# Patient Record
Sex: MALE | Race: WHITE | NOT HISPANIC OR LATINO | Employment: OTHER | URBAN - METROPOLITAN AREA
[De-identification: names, ages, dates, MRNs, and addresses within clinical notes are randomized per-mention and may not be internally consistent; named-entity substitution may affect disease eponyms.]

---

## 2023-03-18 LAB
THYROTROPIN (MIU/L) IN SER/PLAS BY DETECTION LIMIT <= 0.05 MIU/L: 2.02 MIU/L (ref 0.44–3.98)
THYROXINE (T4) FREE (NG/DL) IN SER/PLAS: 1.02 NG/DL (ref 0.78–1.48)

## 2023-03-22 LAB — TSH RECEPTOR ANTIBODY: 1.68 IU/L

## 2023-04-02 ENCOUNTER — HOSPITAL ENCOUNTER (OUTPATIENT)
Dept: DATA CONVERSION | Facility: HOSPITAL | Age: 73
Discharge: LEFT BEFORE DISCHARGE | End: 2023-04-02
Attending: EMERGENCY MEDICINE
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
## 2023-04-02 DIAGNOSIS — Z79.01 LONG TERM (CURRENT) USE OF ANTICOAGULANTS: ICD-10-CM

## 2023-04-02 DIAGNOSIS — I48.91 UNSPECIFIED ATRIAL FIBRILLATION (MULTI): ICD-10-CM

## 2023-04-02 DIAGNOSIS — Z20.822 CONTACT WITH AND (SUSPECTED) EXPOSURE TO COVID-19: ICD-10-CM

## 2023-04-02 DIAGNOSIS — R55 SYNCOPE AND COLLAPSE: Primary | ICD-10-CM

## 2023-04-02 DIAGNOSIS — S00.03XA CONTUSION OF SCALP, INITIAL ENCOUNTER: ICD-10-CM

## 2023-04-02 DIAGNOSIS — S50.311A ABRASION OF RIGHT ELBOW, INITIAL ENCOUNTER: ICD-10-CM

## 2023-04-02 DIAGNOSIS — D64.9 ANEMIA, UNSPECIFIED: ICD-10-CM

## 2023-04-02 LAB
ALBUMIN SERPL-MCNC: 3.9 GM/DL (ref 3.5–5)
ALBUMIN/GLOB SERPL: 1.1 RATIO (ref 1.5–3)
ALP BLD-CCNC: 95 U/L (ref 35–125)
ALT SERPL-CCNC: 11 U/L (ref 5–40)
ANION GAP SERPL CALCULATED.3IONS-SCNC: 11 MMOL/L (ref 0–19)
AST SERPL-CCNC: 14 U/L (ref 5–40)
BASOPHILS # BLD AUTO: 0.07 K/UL (ref 0–0.22)
BASOPHILS NFR BLD AUTO: 0.8 % (ref 0–1)
BILIRUB SERPL-MCNC: 0.4 MG/DL (ref 0.1–1.2)
BUN SERPL-MCNC: 19 MG/DL (ref 8–25)
BUN/CREAT SERPL: 19 RATIO (ref 8–21)
CALCIUM SERPL-MCNC: 9.1 MG/DL (ref 8.5–10.4)
CHLORIDE SERPL-SCNC: 101 MMOL/L (ref 97–107)
CO2 SERPL-SCNC: 24 MMOL/L (ref 24–31)
CREAT SERPL-MCNC: 1 MG/DL (ref 0.4–1.6)
DEPRECATED RDW RBC AUTO: 42.3 FL (ref 37–54)
DIFFERENTIAL METHOD BLD: ABNORMAL
EOSINOPHIL # BLD AUTO: 0.19 K/UL (ref 0–0.45)
EOSINOPHIL NFR BLD: 2.2 % (ref 0–3)
ERYTHROCYTE [DISTWIDTH] IN BLOOD BY AUTOMATED COUNT: 15.4 % (ref 11.7–15)
EUA DISCLAIMER: NORMAL
FLUAV RNA NPH QL NAA+PROBE: NEGATIVE
FLUBV RNA NPH QL NAA+PROBE: NEGATIVE
GFR SERPL CREATININE-BSD FRML MDRD: 79 ML/MIN/1.73 M2
GLOBULIN SER-MCNC: 3.4 G/DL (ref 1.9–3.7)
GLUCOSE SERPL-MCNC: 132 MG/DL (ref 65–99)
HCT VFR BLD AUTO: 29.4 % (ref 41–50)
HGB BLD-MCNC: 8.9 GM/DL (ref 13.5–16.5)
IMM GRANULOCYTES # BLD AUTO: 0.02 K/UL (ref 0–0.1)
LYMPHOCYTES # BLD AUTO: 2.68 K/UL (ref 1.2–3.2)
LYMPHOCYTES NFR BLD MANUAL: 30.5 % (ref 20–40)
MCH RBC QN AUTO: 23.4 PG (ref 26–34)
MCHC RBC AUTO-ENTMCNC: 30.3 % (ref 31–37)
MCV RBC AUTO: 77.4 FL (ref 80–100)
MONOCYTES # BLD AUTO: 1.09 K/UL (ref 0–0.8)
MONOCYTES NFR BLD MANUAL: 12.4 % (ref 0–8)
NEUTROPHILS # BLD AUTO: 4.75 K/UL
NEUTROPHILS # BLD AUTO: 4.75 K/UL (ref 1.8–7.7)
NEUTROPHILS.IMMATURE NFR BLD: 0.2 % (ref 0–1)
NEUTS SEG NFR BLD: 53.9 % (ref 50–70)
NRBC BLD-RTO: 0 /100 WBC
PLATELET # BLD AUTO: 244 K/UL (ref 150–450)
PMV BLD AUTO: 12.7 CU (ref 7–12.6)
POTASSIUM SERPL-SCNC: 4.4 MMOL/L (ref 3.4–5.1)
PROT SERPL-MCNC: 7.3 G/DL (ref 5.9–7.9)
RBC # BLD AUTO: 3.8 M/UL (ref 4.5–5.5)
SARS-COV-2 RNA SPEC QL NAA+PROBE: NEGATIVE
SODIUM SERPL-SCNC: 136 MMOL/L (ref 133–145)
WBC # BLD AUTO: 8.8 K/UL (ref 4.5–11)

## 2023-05-13 LAB
ACANTHOCYTES PRESENCE IN BLOOD BY LIGHT MICROSCOPY: NORMAL
BASOPHILS (10*3/UL) IN BLOOD BY AUTOMATED COUNT: 0.09 X10E9/L (ref 0–0.1)
BASOPHILS/100 LEUKOCYTES IN BLOOD BY AUTOMATED COUNT: 0.9 % (ref 0–2)
EOSINOPHILS (10*3/UL) IN BLOOD BY AUTOMATED COUNT: 0.17 X10E9/L (ref 0–0.4)
EOSINOPHILS/100 LEUKOCYTES IN BLOOD BY AUTOMATED COUNT: 1.8 % (ref 0–6)
ERYTHROCYTE DISTRIBUTION WIDTH (RATIO) BY AUTOMATED COUNT: 22 % (ref 11.5–14.5)
ERYTHROCYTE MEAN CORPUSCULAR HEMOGLOBIN CONCENTRATION (G/DL) BY AUTOMATED: 31.3 G/DL (ref 32–36)
ERYTHROCYTE MEAN CORPUSCULAR VOLUME (FL) BY AUTOMATED COUNT: 84 FL (ref 80–100)
ERYTHROCYTES (10*6/UL) IN BLOOD BY AUTOMATED COUNT: 2.94 X10E12/L (ref 4.5–5.9)
FERRITIN (UG/LL) IN SER/PLAS: 17 UG/L (ref 20–300)
HEMATOCRIT (%) IN BLOOD BY AUTOMATED COUNT: 24.6 % (ref 41–52)
HEMOGLOBIN (G/DL) IN BLOOD: 7.7 G/DL (ref 13.5–17.5)
HYPOCHROMIA (PRESENCE) IN BLOOD BY LIGHT MICROSCOPY: NORMAL
IMMATURE GRANULOCYTES/100 LEUKOCYTES IN BLOOD BY AUTOMATED COUNT: 0.2 % (ref 0–0.9)
IRON (UG/DL) IN SER/PLAS: 23 UG/DL (ref 35–150)
IRON BINDING CAPACITY (UG/DL) IN SER/PLAS: >473 UG/DL (ref 240–445)
IRON SATURATION (%) IN SER/PLAS: ABNORMAL % (ref 25–45)
LEUKOCYTES (10*3/UL) IN BLOOD BY AUTOMATED COUNT: 9.5 X10E9/L (ref 4.4–11.3)
LYMPHOCYTES (10*3/UL) IN BLOOD BY AUTOMATED COUNT: 2.92 X10E9/L (ref 0.8–3)
LYMPHOCYTES/100 LEUKOCYTES IN BLOOD BY AUTOMATED COUNT: 30.7 % (ref 13–44)
MONOCYTES (10*3/UL) IN BLOOD BY AUTOMATED COUNT: 1.07 X10E9/L (ref 0.05–0.8)
MONOCYTES/100 LEUKOCYTES IN BLOOD BY AUTOMATED COUNT: 11.2 % (ref 2–10)
NEUTROPHILS (10*3/UL) IN BLOOD BY AUTOMATED COUNT: 5.25 X10E9/L (ref 1.6–5.5)
NEUTROPHILS/100 LEUKOCYTES IN BLOOD BY AUTOMATED COUNT: 55.2 % (ref 40–80)
NRBC (PER 100 WBCS) BY AUTOMATED COUNT: 0.2 /100 WBC (ref 0–0)
PLATELETS (10*3/UL) IN BLOOD AUTOMATED COUNT: 197 X10E9/L (ref 150–450)
POLYCHROMASIA IN BLOOD BY LIGHT MICROSCOPY: NORMAL
RBC MORPHOLOGY IN BLOOD: NORMAL
SCHISTOCYTES (PRESENCE) IN BLOOD BY LIGHT MICROSCOPY: NORMAL
TARGET CELLS IN BLOOD BY LIGHT MICROSCOPY: NORMAL

## 2023-05-20 LAB
CARCINOEMBRYONIC AG (NG/ML) IN SER/PLAS: 4.7 UG/L
ERYTHROCYTE DISTRIBUTION WIDTH (RATIO) BY AUTOMATED COUNT: 20.9 % (ref 11.5–14.5)
ERYTHROCYTE MEAN CORPUSCULAR HEMOGLOBIN CONCENTRATION (G/DL) BY AUTOMATED: 32.5 G/DL (ref 32–36)
ERYTHROCYTE MEAN CORPUSCULAR VOLUME (FL) BY AUTOMATED COUNT: 83 FL (ref 80–100)
ERYTHROCYTES (10*6/UL) IN BLOOD BY AUTOMATED COUNT: 2.78 X10E12/L (ref 4.5–5.9)
HEMATOCRIT (%) IN BLOOD BY AUTOMATED COUNT: 23.1 % (ref 41–52)
HEMOGLOBIN (G/DL) IN BLOOD: 7.5 G/DL (ref 13.5–17.5)
LEUKOCYTES (10*3/UL) IN BLOOD BY AUTOMATED COUNT: 9.2 X10E9/L (ref 4.4–11.3)
NRBC (PER 100 WBCS) BY AUTOMATED COUNT: 0.2 /100 WBC (ref 0–0)
PLATELETS (10*3/UL) IN BLOOD AUTOMATED COUNT: ABNORMAL X10E9/L (ref 150–450)

## 2023-06-21 LAB
ACANTHOCYTES PRESENCE IN BLOOD BY LIGHT MICROSCOPY: NORMAL
ALANINE AMINOTRANSFERASE (SGPT) (U/L) IN SER/PLAS: 11 U/L (ref 10–52)
ALBUMIN (G/DL) IN SER/PLAS: 4.3 G/DL (ref 3.4–5)
ALKALINE PHOSPHATASE (U/L) IN SER/PLAS: 105 U/L (ref 33–136)
ANION GAP IN SER/PLAS: 14 MMOL/L (ref 10–20)
ASPARTATE AMINOTRANSFERASE (SGOT) (U/L) IN SER/PLAS: 14 U/L (ref 9–39)
BASOPHILS (10*3/UL) IN BLOOD BY AUTOMATED COUNT: 0.08 X10E9/L (ref 0–0.1)
BASOPHILS/100 LEUKOCYTES IN BLOOD BY AUTOMATED COUNT: 1 % (ref 0–2)
BILIRUBIN TOTAL (MG/DL) IN SER/PLAS: 0.5 MG/DL (ref 0–1.2)
BURR CELLS PRESENCE IN BLOOD BY LIGHT MICROSCOPY: NORMAL
CALCIUM (MG/DL) IN SER/PLAS: 10.2 MG/DL (ref 8.6–10.6)
CARBON DIOXIDE, TOTAL (MMOL/L) IN SER/PLAS: 28 MMOL/L (ref 21–32)
CARCINOEMBRYONIC AG (NG/ML) IN SER/PLAS: 4.4 UG/L
CHLORIDE (MMOL/L) IN SER/PLAS: 104 MMOL/L (ref 98–107)
CREATININE (MG/DL) IN SER/PLAS: 1.05 MG/DL (ref 0.5–1.3)
EOSINOPHILS (10*3/UL) IN BLOOD BY AUTOMATED COUNT: 0.17 X10E9/L (ref 0–0.4)
EOSINOPHILS/100 LEUKOCYTES IN BLOOD BY AUTOMATED COUNT: 2.1 % (ref 0–6)
ERYTHROCYTE DISTRIBUTION WIDTH (RATIO) BY AUTOMATED COUNT: 24.6 % (ref 11.5–14.5)
ERYTHROCYTE MEAN CORPUSCULAR HEMOGLOBIN CONCENTRATION (G/DL) BY AUTOMATED: 29.7 G/DL (ref 32–36)
ERYTHROCYTE MEAN CORPUSCULAR VOLUME (FL) BY AUTOMATED COUNT: 81 FL (ref 80–100)
ERYTHROCYTES (10*6/UL) IN BLOOD BY AUTOMATED COUNT: 4.36 X10E12/L (ref 4.5–5.9)
GFR MALE: 75 ML/MIN/1.73M2
GLUCOSE (MG/DL) IN SER/PLAS: 92 MG/DL (ref 74–99)
HEMATOCRIT (%) IN BLOOD BY AUTOMATED COUNT: 35.3 % (ref 41–52)
HEMOGLOBIN (G/DL) IN BLOOD: 10.5 G/DL (ref 13.5–17.5)
HOWELL-JOLLY BODIES PRESENCE IN BLOOD BY LIGHT MICROSCOPY: PRESENT
IMMATURE GRANULOCYTES/100 LEUKOCYTES IN BLOOD BY AUTOMATED COUNT: 0.2 % (ref 0–0.9)
INR IN PPP BY COAGULATION ASSAY: 1 (ref 0.9–1.1)
LEUKOCYTES (10*3/UL) IN BLOOD BY AUTOMATED COUNT: 8.1 X10E9/L (ref 4.4–11.3)
LYMPHOCYTES (10*3/UL) IN BLOOD BY AUTOMATED COUNT: 2.45 X10E9/L (ref 0.8–3)
LYMPHOCYTES/100 LEUKOCYTES IN BLOOD BY AUTOMATED COUNT: 30.4 % (ref 13–44)
MONOCYTES (10*3/UL) IN BLOOD BY AUTOMATED COUNT: 0.92 X10E9/L (ref 0.05–0.8)
MONOCYTES/100 LEUKOCYTES IN BLOOD BY AUTOMATED COUNT: 11.4 % (ref 2–10)
NEUTROPHILS (10*3/UL) IN BLOOD BY AUTOMATED COUNT: 4.43 X10E9/L (ref 1.6–5.5)
NEUTROPHILS/100 LEUKOCYTES IN BLOOD BY AUTOMATED COUNT: 54.9 % (ref 40–80)
NRBC (PER 100 WBCS) BY AUTOMATED COUNT: 0 /100 WBC (ref 0–0)
PLATELETS (10*3/UL) IN BLOOD AUTOMATED COUNT: 223 X10E9/L (ref 150–450)
POTASSIUM (MMOL/L) IN SER/PLAS: 5 MMOL/L (ref 3.5–5.3)
PROTEIN TOTAL: 8.1 G/DL (ref 6.4–8.2)
PROTHROMBIN TIME (PT) IN PPP BY COAGULATION ASSAY: 11 SEC (ref 9.8–12.8)
RBC MORPHOLOGY IN BLOOD: NORMAL
SCHISTOCYTES (PRESENCE) IN BLOOD BY LIGHT MICROSCOPY: NORMAL
SODIUM (MMOL/L) IN SER/PLAS: 141 MMOL/L (ref 136–145)
TARGET CELLS IN BLOOD BY LIGHT MICROSCOPY: NORMAL
UREA NITROGEN (MG/DL) IN SER/PLAS: 18 MG/DL (ref 6–23)

## 2023-07-07 ENCOUNTER — HOSPITAL ENCOUNTER (OUTPATIENT)
Dept: DATA CONVERSION | Facility: HOSPITAL | Age: 73
End: 2023-07-07
Attending: RADIOLOGY | Admitting: RADIOLOGY
Payer: MEDICARE

## 2023-07-07 DIAGNOSIS — C20 MALIGNANT NEOPLASM OF RECTUM (MULTI): ICD-10-CM

## 2023-07-07 DIAGNOSIS — C78.7 SECONDARY MALIGNANT NEOPLASM OF LIVER AND INTRAHEPATIC BILE DUCT (MULTI): ICD-10-CM

## 2023-09-23 VITALS — WEIGHT: 210.1 LBS | HEIGHT: 73 IN | BODY MASS INDEX: 27.85 KG/M2

## 2023-09-23 DIAGNOSIS — C20 MALIGNANT NEOPLASM OF RECTUM (MULTI): Primary | ICD-10-CM

## 2023-09-23 RX ORDER — HEPARIN SODIUM,PORCINE/PF 10 UNIT/ML
50 SYRINGE (ML) INTRAVENOUS AS NEEDED
Status: CANCELLED | OUTPATIENT
Start: 2023-09-30

## 2023-09-23 RX ORDER — HEPARIN 100 UNIT/ML
500 SYRINGE INTRAVENOUS AS NEEDED
Status: CANCELLED | OUTPATIENT
Start: 2023-09-30

## 2023-09-28 ENCOUNTER — HOSPITAL ENCOUNTER (OUTPATIENT)
Dept: DATA CONVERSION | Facility: HOSPITAL | Age: 73
End: 2023-09-28
Attending: RADIOLOGY | Admitting: RADIOLOGY
Payer: MEDICARE

## 2023-09-28 DIAGNOSIS — C20 MALIGNANT NEOPLASM OF RECTUM (MULTI): ICD-10-CM

## 2023-09-29 VITALS — HEIGHT: 73 IN | BODY MASS INDEX: 27.87 KG/M2 | WEIGHT: 210.32 LBS

## 2023-09-29 VITALS — HEIGHT: 73 IN | WEIGHT: 205.47 LBS | BODY MASS INDEX: 27.23 KG/M2

## 2023-09-30 NOTE — H&P
History & Physical Reviewed:   I have reviewed the History and Physical dated:  20-Jun-2023   History and Physical reviewed and relevant findings noted. Patient examined to review pertinent physical  findings.: No significant changes   Home Medications Reviewed: no changes noted   Allergies Reviewed: no changes noted       Airway/Sedation Assessment:  ·  Emotional Status calm   ·  Neurologic alert & oriented x 3   ·  Respiratory clear to auscultation   ·  Cardiovascular rhythm & rate regular   ·  GI/ soft, nontender     · Pulses present: Pedal Left, Pedal Right, Radial Left, Radial Right     ·  Mouth Opening OK yes   ·  Neck Flexibility OK yes   ·  Loose Teeth no   ·  Oropharyngeal Classification Class II   ·  ASA PS Classification ASA III   ·  Sedation Plan moderate sedation       ERAS (Enhanced Recovery After Surgery):  ·  ERAS Patient: no     Consent:   COVID-19 Consent:  ·  COVID-19 Risk Consent Surgeon has reviewed key risks related to the risk of matt COVID-19 and if they contract COVID-19 what the risks are.     Assessment/Plan:   ·  Assessment and Plan    72 Year old male with rectal ca presents for Mediport with Dr Weber.       Electronic Signatures:  Yady Katz (PAC)  (Signed 07-Jul-2023 14:41)   Authored: History & Physical Reviewed, Airway/Sedation,  ERAS, Consent, Assessment/Plan, Note Completion      Last Updated: 07-Jul-2023 14:41 by Yady Katz (PAC)

## 2023-10-02 ENCOUNTER — TELEPHONE (OUTPATIENT)
Dept: ADMISSION | Facility: HOSPITAL | Age: 73
End: 2023-10-02
Payer: MEDICARE

## 2023-10-10 PROBLEM — H04.123 BILATERAL DRY EYES: Status: ACTIVE | Noted: 2023-10-10

## 2023-10-10 PROBLEM — E05.00 GRAVES DISEASE: Status: ACTIVE | Noted: 2023-10-10

## 2023-10-10 PROBLEM — H53.2 DIPLOPIA: Status: ACTIVE | Noted: 2023-10-10

## 2023-10-10 PROBLEM — D69.3 IDIOPATHIC THROMBOCYTOPENIC PURPURA (ITP) (MULTI): Status: ACTIVE | Noted: 2023-10-10

## 2023-10-10 PROBLEM — R42 DIZZINESS: Status: ACTIVE | Noted: 2023-10-10

## 2023-10-10 PROBLEM — E05.90 HYPERTHYROIDISM: Status: ACTIVE | Noted: 2023-10-10

## 2023-10-10 PROBLEM — E86.0 DEHYDRATION: Status: ACTIVE | Noted: 2023-10-10

## 2023-10-10 PROBLEM — E05.00 THYROTOXICOSIS WITH DIFFUSE GOITER WITHOUT THYROTOXIC CRISIS OR STORM: Status: ACTIVE | Noted: 2023-10-10

## 2023-10-10 PROBLEM — I48.0 PAROXYSMAL ATRIAL FIBRILLATION (MULTI): Status: ACTIVE | Noted: 2023-10-10

## 2023-10-10 PROBLEM — I10 ESSENTIAL HYPERTENSION: Status: ACTIVE | Noted: 2023-10-10

## 2023-10-10 PROBLEM — E05.00 GRAVES' ORBITOPATHY: Status: ACTIVE | Noted: 2023-10-10

## 2023-10-10 PROBLEM — I48.92 ATRIAL FLUTTER (MULTI): Status: ACTIVE | Noted: 2023-10-10

## 2023-10-10 PROBLEM — D50.9 MICROCYTIC ANEMIA: Status: ACTIVE | Noted: 2023-10-10

## 2023-10-10 PROBLEM — H35.54 RPE (RETINAL PIGMENT EPITHELIUM) ATROPHY: Status: ACTIVE | Noted: 2023-10-10

## 2023-10-10 PROBLEM — R60.9 EDEMA: Status: ACTIVE | Noted: 2023-10-10

## 2023-10-10 PROBLEM — R06.09 DYSPNEA ON EXERTION: Status: ACTIVE | Noted: 2023-10-10

## 2023-10-10 PROBLEM — R55 SYNCOPE AND COLLAPSE: Status: ACTIVE | Noted: 2023-10-10

## 2023-10-10 RX ORDER — PROPRANOLOL HYDROCHLORIDE 60 MG/1
60 TABLET ORAL 3 TIMES DAILY
COMMUNITY
End: 2023-12-20 | Stop reason: SDUPTHER

## 2023-10-10 RX ORDER — APIXABAN 5 MG/1
5 TABLET, FILM COATED ORAL 2 TIMES DAILY
COMMUNITY
Start: 2018-06-25 | End: 2023-11-01 | Stop reason: ALTCHOICE

## 2023-10-10 RX ORDER — NITROGLYCERIN 0.4 MG/1
0.4 TABLET SUBLINGUAL AS NEEDED
COMMUNITY
Start: 2017-02-28 | End: 2023-10-11 | Stop reason: ALTCHOICE

## 2023-10-10 RX ORDER — METHIMAZOLE 5 MG/1
2.5 TABLET ORAL DAILY
COMMUNITY
End: 2023-11-08 | Stop reason: SDUPTHER

## 2023-10-10 RX ORDER — METHIMAZOLE 10 MG/1
10 TABLET ORAL EVERY 8 HOURS
COMMUNITY
Start: 2018-06-25 | End: 2023-10-11 | Stop reason: ALTCHOICE

## 2023-10-11 ENCOUNTER — INFUSION (OUTPATIENT)
Dept: HEMATOLOGY/ONCOLOGY | Facility: CLINIC | Age: 73
End: 2023-10-11
Payer: MEDICARE

## 2023-10-11 VITALS
SYSTOLIC BLOOD PRESSURE: 144 MMHG | WEIGHT: 215.17 LBS | BODY MASS INDEX: 28.52 KG/M2 | RESPIRATION RATE: 18 BRPM | DIASTOLIC BLOOD PRESSURE: 85 MMHG | TEMPERATURE: 97.9 F | HEIGHT: 73 IN | HEART RATE: 71 BPM | OXYGEN SATURATION: 97 %

## 2023-10-11 DIAGNOSIS — C20 MALIGNANT NEOPLASM OF RECTUM (MULTI): ICD-10-CM

## 2023-10-11 LAB
ALBUMIN SERPL BCP-MCNC: 3.6 G/DL (ref 3.4–5)
ALP SERPL-CCNC: 99 U/L (ref 33–136)
ALT SERPL W P-5'-P-CCNC: 15 U/L (ref 10–52)
ANION GAP SERPL CALC-SCNC: 12 MMOL/L (ref 10–20)
AST SERPL W P-5'-P-CCNC: 20 U/L (ref 9–39)
BASOPHILS # BLD AUTO: 0.07 X10*3/UL (ref 0–0.1)
BASOPHILS NFR BLD AUTO: 0.9 %
BILIRUB SERPL-MCNC: 0.4 MG/DL (ref 0–1.2)
BUN SERPL-MCNC: 14 MG/DL (ref 6–23)
CALCIUM SERPL-MCNC: 9.1 MG/DL (ref 8.6–10.6)
CHLORIDE SERPL-SCNC: 106 MMOL/L (ref 98–107)
CO2 SERPL-SCNC: 26 MMOL/L (ref 21–32)
CREAT SERPL-MCNC: 0.82 MG/DL (ref 0.5–1.3)
EOSINOPHIL # BLD AUTO: 0.25 X10*3/UL (ref 0–0.4)
EOSINOPHIL NFR BLD AUTO: 3.2 %
ERYTHROCYTE [DISTWIDTH] IN BLOOD BY AUTOMATED COUNT: 19.2 % (ref 11.5–14.5)
GFR SERPL CREATININE-BSD FRML MDRD: >90 ML/MIN/1.73M*2
GLUCOSE SERPL-MCNC: 113 MG/DL (ref 74–99)
HCT VFR BLD AUTO: 34.9 % (ref 41–52)
HGB BLD-MCNC: 11.2 G/DL (ref 13.5–17.5)
IMM GRANULOCYTES # BLD AUTO: 0.03 X10*3/UL (ref 0–0.5)
IMM GRANULOCYTES NFR BLD AUTO: 0.4 % (ref 0–0.9)
LYMPHOCYTES # BLD AUTO: 2.77 X10*3/UL (ref 0.8–3)
LYMPHOCYTES NFR BLD AUTO: 35 %
MCH RBC QN AUTO: 28 PG (ref 26–34)
MCHC RBC AUTO-ENTMCNC: 32.1 G/DL (ref 32–36)
MCV RBC AUTO: 87 FL (ref 80–100)
MONOCYTES # BLD AUTO: 0.79 X10*3/UL (ref 0.05–0.8)
MONOCYTES NFR BLD AUTO: 10 %
NEUTROPHILS # BLD AUTO: 4.01 X10*3/UL (ref 1.6–5.5)
NEUTROPHILS NFR BLD AUTO: 50.5 %
NRBC BLD-RTO: 0 /100 WBCS (ref 0–0)
PLATELET # BLD AUTO: 146 X10*3/UL (ref 150–450)
PMV BLD AUTO: 11.7 FL (ref 7.5–11.5)
POTASSIUM SERPL-SCNC: 4 MMOL/L (ref 3.5–5.3)
PROT SERPL-MCNC: 7 G/DL (ref 6.4–8.2)
RBC # BLD AUTO: 4 X10*6/UL (ref 4.5–5.9)
SODIUM SERPL-SCNC: 140 MMOL/L (ref 136–145)
WBC # BLD AUTO: 7.9 X10*3/UL (ref 4.4–11.3)

## 2023-10-11 PROCEDURE — 36600 WITHDRAWAL OF ARTERIAL BLOOD: CPT

## 2023-10-11 PROCEDURE — 80053 COMPREHEN METABOLIC PANEL: CPT | Performed by: INTERNAL MEDICINE

## 2023-10-11 PROCEDURE — 96411 CHEMO IV PUSH ADDL DRUG: CPT

## 2023-10-11 PROCEDURE — 96375 TX/PRO/DX INJ NEW DRUG ADDON: CPT

## 2023-10-11 PROCEDURE — 96415 CHEMO IV INFUSION ADDL HR: CPT

## 2023-10-11 PROCEDURE — 85025 COMPLETE CBC W/AUTO DIFF WBC: CPT | Performed by: INTERNAL MEDICINE

## 2023-10-11 PROCEDURE — 2500000004 HC RX 250 GENERAL PHARMACY W/ HCPCS (ALT 636 FOR OP/ED): Performed by: INTERNAL MEDICINE

## 2023-10-11 PROCEDURE — 84075 ASSAY ALKALINE PHOSPHATASE: CPT | Performed by: INTERNAL MEDICINE

## 2023-10-11 PROCEDURE — 96413 CHEMO IV INFUSION 1 HR: CPT

## 2023-10-11 RX ORDER — LORAZEPAM 2 MG/ML
1 INJECTION INTRAMUSCULAR AS NEEDED
Status: DISCONTINUED | OUTPATIENT
Start: 2023-10-11 | End: 2023-10-11 | Stop reason: HOSPADM

## 2023-10-11 RX ORDER — HEPARIN SODIUM,PORCINE/PF 10 UNIT/ML
50 SYRINGE (ML) INTRAVENOUS AS NEEDED
Status: CANCELLED | OUTPATIENT
Start: 2023-10-11

## 2023-10-11 RX ORDER — PROCHLORPERAZINE MALEATE 10 MG
10 TABLET ORAL EVERY 6 HOURS PRN
Status: DISCONTINUED | OUTPATIENT
Start: 2023-10-11 | End: 2023-10-11 | Stop reason: HOSPADM

## 2023-10-11 RX ORDER — FAMOTIDINE 10 MG/ML
20 INJECTION INTRAVENOUS ONCE AS NEEDED
Status: CANCELLED | OUTPATIENT
Start: 2023-10-11

## 2023-10-11 RX ORDER — FAMOTIDINE 10 MG/ML
20 INJECTION INTRAVENOUS ONCE AS NEEDED
Status: DISCONTINUED | OUTPATIENT
Start: 2023-10-11 | End: 2023-10-11 | Stop reason: HOSPADM

## 2023-10-11 RX ORDER — PROCHLORPERAZINE EDISYLATE 5 MG/ML
10 INJECTION INTRAMUSCULAR; INTRAVENOUS EVERY 6 HOURS PRN
Status: DISCONTINUED | OUTPATIENT
Start: 2023-10-11 | End: 2023-10-11 | Stop reason: HOSPADM

## 2023-10-11 RX ORDER — PROCHLORPERAZINE EDISYLATE 5 MG/ML
10 INJECTION INTRAMUSCULAR; INTRAVENOUS EVERY 6 HOURS PRN
Status: CANCELLED | OUTPATIENT
Start: 2023-10-11

## 2023-10-11 RX ORDER — HEPARIN 100 UNIT/ML
500 SYRINGE INTRAVENOUS AS NEEDED
Status: DISCONTINUED | OUTPATIENT
Start: 2023-10-11 | End: 2023-10-11 | Stop reason: HOSPADM

## 2023-10-11 RX ORDER — PALONOSETRON 0.05 MG/ML
0.25 INJECTION, SOLUTION INTRAVENOUS ONCE
Status: CANCELLED | OUTPATIENT
Start: 2023-10-11

## 2023-10-11 RX ORDER — DIPHENHYDRAMINE HYDROCHLORIDE 50 MG/ML
50 INJECTION INTRAMUSCULAR; INTRAVENOUS AS NEEDED
Status: DISCONTINUED | OUTPATIENT
Start: 2023-10-11 | End: 2023-10-11 | Stop reason: HOSPADM

## 2023-10-11 RX ORDER — DIPHENHYDRAMINE HYDROCHLORIDE 50 MG/ML
50 INJECTION INTRAMUSCULAR; INTRAVENOUS AS NEEDED
Status: CANCELLED | OUTPATIENT
Start: 2023-10-11

## 2023-10-11 RX ORDER — ALBUTEROL SULFATE 0.83 MG/ML
3 SOLUTION RESPIRATORY (INHALATION) AS NEEDED
Status: CANCELLED | OUTPATIENT
Start: 2023-10-11

## 2023-10-11 RX ORDER — LORAZEPAM 2 MG/ML
1 INJECTION INTRAMUSCULAR AS NEEDED
Status: CANCELLED | OUTPATIENT
Start: 2023-10-11

## 2023-10-11 RX ORDER — ALBUTEROL SULFATE 0.83 MG/ML
3 SOLUTION RESPIRATORY (INHALATION) AS NEEDED
Status: DISCONTINUED | OUTPATIENT
Start: 2023-10-11 | End: 2023-10-11 | Stop reason: HOSPADM

## 2023-10-11 RX ORDER — PROCHLORPERAZINE MALEATE 10 MG
10 TABLET ORAL EVERY 6 HOURS PRN
Status: CANCELLED | OUTPATIENT
Start: 2023-10-11

## 2023-10-11 RX ORDER — HEPARIN SODIUM,PORCINE/PF 10 UNIT/ML
50 SYRINGE (ML) INTRAVENOUS AS NEEDED
Status: DISCONTINUED | OUTPATIENT
Start: 2023-10-11 | End: 2023-10-11 | Stop reason: HOSPADM

## 2023-10-11 RX ORDER — EPINEPHRINE 0.3 MG/.3ML
0.3 INJECTION SUBCUTANEOUS EVERY 5 MIN PRN
Status: CANCELLED | OUTPATIENT
Start: 2023-10-11

## 2023-10-11 RX ORDER — HEPARIN 100 UNIT/ML
500 SYRINGE INTRAVENOUS AS NEEDED
Status: CANCELLED | OUTPATIENT
Start: 2023-10-11

## 2023-10-11 RX ORDER — PALONOSETRON 0.05 MG/ML
0.25 INJECTION, SOLUTION INTRAVENOUS ONCE
Status: COMPLETED | OUTPATIENT
Start: 2023-10-11 | End: 2023-10-11

## 2023-10-11 RX ORDER — EPINEPHRINE 0.3 MG/.3ML
0.3 INJECTION SUBCUTANEOUS EVERY 5 MIN PRN
Status: DISCONTINUED | OUTPATIENT
Start: 2023-10-11 | End: 2023-10-11 | Stop reason: HOSPADM

## 2023-10-11 RX ADMIN — PALONOSETRON HYDROCHLORIDE 250 MCG: 0.25 INJECTION INTRAVENOUS at 09:57

## 2023-10-11 RX ADMIN — FLUOROURACIL 5300 MG: 50 INJECTION, SOLUTION INTRAVENOUS at 12:34

## 2023-10-11 RX ADMIN — OXALIPLATIN 190 MG: 5 INJECTION, SOLUTION INTRAVENOUS at 10:26

## 2023-10-11 RX ADMIN — DEXAMETHASONE SODIUM PHOSPHATE 12 MG: 4 INJECTION, SOLUTION INTRAMUSCULAR; INTRAVENOUS at 09:58

## 2023-10-11 ASSESSMENT — ENCOUNTER SYMPTOMS
OCCASIONAL FEELINGS OF UNSTEADINESS: 0
LOSS OF SENSATION IN FEET: 0
DEPRESSION: 0

## 2023-10-11 ASSESSMENT — PATIENT HEALTH QUESTIONNAIRE - PHQ9
1. LITTLE INTEREST OR PLEASURE IN DOING THINGS: NOT AT ALL
SUM OF ALL RESPONSES TO PHQ9 QUESTIONS 1 AND 2: 0
2. FEELING DOWN, DEPRESSED OR HOPELESS: NOT AT ALL

## 2023-10-11 ASSESSMENT — PAIN SCALES - GENERAL: PAINLEVEL: 0-NO PAIN

## 2023-10-11 ASSESSMENT — COLUMBIA-SUICIDE SEVERITY RATING SCALE - C-SSRS
2. HAVE YOU ACTUALLY HAD ANY THOUGHTS OF KILLING YOURSELF?: NO
6. HAVE YOU EVER DONE ANYTHING, STARTED TO DO ANYTHING, OR PREPARED TO DO ANYTHING TO END YOUR LIFE?: NO
1. IN THE PAST MONTH, HAVE YOU WISHED YOU WERE DEAD OR WISHED YOU COULD GO TO SLEEP AND NOT WAKE UP?: NO

## 2023-10-13 ENCOUNTER — OFFICE VISIT (OUTPATIENT)
Dept: HEMATOLOGY/ONCOLOGY | Facility: CLINIC | Age: 73
End: 2023-10-13
Payer: MEDICARE

## 2023-10-13 ENCOUNTER — INFUSION (OUTPATIENT)
Dept: HEMATOLOGY/ONCOLOGY | Facility: CLINIC | Age: 73
End: 2023-10-13
Payer: MEDICARE

## 2023-10-13 VITALS
DIASTOLIC BLOOD PRESSURE: 81 MMHG | HEART RATE: 45 BPM | TEMPERATURE: 96.6 F | RESPIRATION RATE: 18 BRPM | WEIGHT: 216.27 LBS | BODY MASS INDEX: 28.54 KG/M2 | SYSTOLIC BLOOD PRESSURE: 149 MMHG

## 2023-10-13 DIAGNOSIS — R01.1 HEART MURMUR: Primary | ICD-10-CM

## 2023-10-13 DIAGNOSIS — C20 MALIGNANT NEOPLASM OF RECTUM (MULTI): ICD-10-CM

## 2023-10-13 PROCEDURE — 96523 IRRIG DRUG DELIVERY DEVICE: CPT

## 2023-10-13 PROCEDURE — 2500000004 HC RX 250 GENERAL PHARMACY W/ HCPCS (ALT 636 FOR OP/ED): Performed by: INTERNAL MEDICINE

## 2023-10-13 RX ORDER — HEPARIN SODIUM,PORCINE/PF 10 UNIT/ML
50 SYRINGE (ML) INTRAVENOUS AS NEEDED
Status: ACTIVE | OUTPATIENT
Start: 2023-10-13

## 2023-10-13 RX ORDER — HEPARIN 100 UNIT/ML
500 SYRINGE INTRAVENOUS AS NEEDED
Status: CANCELLED | OUTPATIENT
Start: 2023-10-13

## 2023-10-13 RX ORDER — HEPARIN SODIUM,PORCINE/PF 10 UNIT/ML
50 SYRINGE (ML) INTRAVENOUS AS NEEDED
Status: CANCELLED | OUTPATIENT
Start: 2023-10-13

## 2023-10-13 RX ORDER — HEPARIN 100 UNIT/ML
500 SYRINGE INTRAVENOUS AS NEEDED
Status: DISPENSED | OUTPATIENT
Start: 2023-10-13

## 2023-10-13 RX ADMIN — HEPARIN 500 UNITS: 100 SYRINGE at 11:42

## 2023-10-13 ASSESSMENT — ENCOUNTER SYMPTOMS
DEPRESSION: 0
OCCASIONAL FEELINGS OF UNSTEADINESS: 0
LOSS OF SENSATION IN FEET: 0

## 2023-10-13 ASSESSMENT — PAIN SCALES - GENERAL: PAINLEVEL: 0-NO PAIN

## 2023-10-13 NOTE — PROGRESS NOTES
Patients HR low - patient states he has no light headedness or dizziness and feels well. According to his trend he does run low normally.

## 2023-10-13 NOTE — PROGRESS NOTES
Patients HR is a little low - patient states he feels well - no light headedness or dizziness. Looked at patients trend in [employer] and his HR runs low.

## 2023-10-24 DIAGNOSIS — C20 MALIGNANT NEOPLASM OF RECTUM (MULTI): Primary | ICD-10-CM

## 2023-10-24 RX ORDER — FAMOTIDINE 10 MG/ML
20 INJECTION INTRAVENOUS ONCE AS NEEDED
Status: CANCELLED | OUTPATIENT
Start: 2023-11-01

## 2023-10-24 RX ORDER — PROCHLORPERAZINE MALEATE 10 MG
10 TABLET ORAL EVERY 6 HOURS PRN
Status: CANCELLED | OUTPATIENT
Start: 2023-11-01

## 2023-10-24 RX ORDER — DIPHENHYDRAMINE HYDROCHLORIDE 50 MG/ML
50 INJECTION INTRAMUSCULAR; INTRAVENOUS AS NEEDED
Status: CANCELLED | OUTPATIENT
Start: 2023-11-01

## 2023-10-24 RX ORDER — PROCHLORPERAZINE EDISYLATE 5 MG/ML
10 INJECTION INTRAMUSCULAR; INTRAVENOUS EVERY 6 HOURS PRN
Status: CANCELLED | OUTPATIENT
Start: 2023-11-01

## 2023-10-24 RX ORDER — ALBUTEROL SULFATE 0.83 MG/ML
3 SOLUTION RESPIRATORY (INHALATION) AS NEEDED
Status: CANCELLED | OUTPATIENT
Start: 2023-11-01

## 2023-10-24 RX ORDER — PALONOSETRON 0.05 MG/ML
0.25 INJECTION, SOLUTION INTRAVENOUS ONCE
Status: CANCELLED | OUTPATIENT
Start: 2023-11-01

## 2023-10-24 RX ORDER — EPINEPHRINE 0.3 MG/.3ML
0.3 INJECTION SUBCUTANEOUS EVERY 5 MIN PRN
Status: CANCELLED | OUTPATIENT
Start: 2023-11-01

## 2023-10-24 RX ORDER — LORAZEPAM 2 MG/ML
1 INJECTION INTRAMUSCULAR AS NEEDED
Status: CANCELLED | OUTPATIENT
Start: 2023-11-01

## 2023-10-25 ENCOUNTER — INFUSION (OUTPATIENT)
Dept: HEMATOLOGY/ONCOLOGY | Facility: CLINIC | Age: 73
End: 2023-10-25
Payer: MEDICARE

## 2023-10-25 VITALS
HEART RATE: 75 BPM | OXYGEN SATURATION: 97 % | WEIGHT: 213.41 LBS | RESPIRATION RATE: 18 BRPM | SYSTOLIC BLOOD PRESSURE: 116 MMHG | TEMPERATURE: 97 F | BODY MASS INDEX: 28.16 KG/M2 | DIASTOLIC BLOOD PRESSURE: 73 MMHG

## 2023-10-25 DIAGNOSIS — C20 MALIGNANT NEOPLASM OF RECTUM (MULTI): ICD-10-CM

## 2023-10-25 LAB
ACANTHOCYTES BLD QL SMEAR: NORMAL
ALBUMIN SERPL BCP-MCNC: 3.5 G/DL (ref 3.4–5)
ALP SERPL-CCNC: 84 U/L (ref 33–136)
ALT SERPL W P-5'-P-CCNC: 19 U/L (ref 10–52)
ANION GAP SERPL CALC-SCNC: 11 MMOL/L (ref 10–20)
AST SERPL W P-5'-P-CCNC: 23 U/L (ref 9–39)
BASOPHILS # BLD AUTO: 0.06 X10*3/UL (ref 0–0.1)
BASOPHILS NFR BLD AUTO: 0.8 %
BILIRUB SERPL-MCNC: 0.4 MG/DL (ref 0–1.2)
BUN SERPL-MCNC: 18 MG/DL (ref 6–23)
BURR CELLS BLD QL SMEAR: NORMAL
CALCIUM SERPL-MCNC: 9.3 MG/DL (ref 8.6–10.3)
CEA SERPL-MCNC: 8.4 UG/L
CHLORIDE SERPL-SCNC: 105 MMOL/L (ref 98–107)
CO2 SERPL-SCNC: 28 MMOL/L (ref 21–32)
CREAT SERPL-MCNC: 0.87 MG/DL (ref 0.5–1.3)
EOSINOPHIL # BLD AUTO: 0.15 X10*3/UL (ref 0–0.4)
EOSINOPHIL NFR BLD AUTO: 2 %
ERYTHROCYTE [DISTWIDTH] IN BLOOD BY AUTOMATED COUNT: 17.5 % (ref 11.5–14.5)
GFR SERPL CREATININE-BSD FRML MDRD: >90 ML/MIN/1.73M*2
GIANT PLATELETS BLD QL SMEAR: NORMAL
GLUCOSE SERPL-MCNC: 101 MG/DL (ref 74–99)
HCT VFR BLD AUTO: 34.3 % (ref 41–52)
HGB BLD-MCNC: 10.9 G/DL (ref 13.5–17.5)
IMM GRANULOCYTES # BLD AUTO: 0.01 X10*3/UL (ref 0–0.5)
IMM GRANULOCYTES NFR BLD AUTO: 0.1 % (ref 0–0.9)
LYMPHOCYTES # BLD AUTO: 2.6 X10*3/UL (ref 0.8–3)
LYMPHOCYTES NFR BLD AUTO: 35.3 %
MCH RBC QN AUTO: 27.9 PG (ref 26–34)
MCHC RBC AUTO-ENTMCNC: 31.8 G/DL (ref 32–36)
MCV RBC AUTO: 88 FL (ref 80–100)
MONOCYTES # BLD AUTO: 0.99 X10*3/UL (ref 0.05–0.8)
MONOCYTES NFR BLD AUTO: 13.5 %
NEUTROPHILS # BLD AUTO: 3.55 X10*3/UL (ref 1.6–5.5)
NEUTROPHILS NFR BLD AUTO: 48.3 %
NRBC BLD-RTO: 0.4 /100 WBCS (ref 0–0)
PLATELET # BLD AUTO: 50 X10*3/UL (ref 150–450)
PMV BLD AUTO: ABNORMAL FL
POTASSIUM SERPL-SCNC: 4.1 MMOL/L (ref 3.5–5.3)
PROT SERPL-MCNC: 6.9 G/DL (ref 6.4–8.2)
RBC # BLD AUTO: 3.91 X10*6/UL (ref 4.5–5.9)
RBC MORPH BLD: NORMAL
SCHISTOCYTES BLD QL SMEAR: NORMAL
SODIUM SERPL-SCNC: 140 MMOL/L (ref 136–145)
SPHEROCYTES BLD QL SMEAR: NORMAL
WBC # BLD AUTO: 7.4 X10*3/UL (ref 4.4–11.3)

## 2023-10-25 PROCEDURE — 82378 CARCINOEMBRYONIC ANTIGEN: CPT | Performed by: INTERNAL MEDICINE

## 2023-10-25 PROCEDURE — 85025 COMPLETE CBC W/AUTO DIFF WBC: CPT | Performed by: INTERNAL MEDICINE

## 2023-10-25 PROCEDURE — 84075 ASSAY ALKALINE PHOSPHATASE: CPT | Performed by: INTERNAL MEDICINE

## 2023-10-25 PROCEDURE — 96523 IRRIG DRUG DELIVERY DEVICE: CPT

## 2023-10-25 ASSESSMENT — PAIN SCALES - GENERAL: PAINLEVEL: 0-NO PAIN

## 2023-10-27 ENCOUNTER — APPOINTMENT (OUTPATIENT)
Dept: HEMATOLOGY/ONCOLOGY | Facility: CLINIC | Age: 73
End: 2023-10-27
Payer: MEDICARE

## 2023-11-01 ENCOUNTER — INFUSION (OUTPATIENT)
Dept: HEMATOLOGY/ONCOLOGY | Facility: CLINIC | Age: 73
End: 2023-11-01
Payer: MEDICARE

## 2023-11-01 VITALS
TEMPERATURE: 97.9 F | DIASTOLIC BLOOD PRESSURE: 83 MMHG | SYSTOLIC BLOOD PRESSURE: 132 MMHG | BODY MASS INDEX: 28.28 KG/M2 | HEART RATE: 45 BPM | WEIGHT: 214.29 LBS

## 2023-11-01 DIAGNOSIS — C20 MALIGNANT NEOPLASM OF RECTUM (MULTI): ICD-10-CM

## 2023-11-01 LAB
ACANTHOCYTES BLD QL SMEAR: NORMAL
ALBUMIN SERPL BCP-MCNC: 3.5 G/DL (ref 3.4–5)
ALP SERPL-CCNC: 84 U/L (ref 33–136)
ALT SERPL W P-5'-P-CCNC: 15 U/L (ref 10–52)
ANION GAP SERPL CALC-SCNC: 12 MMOL/L (ref 10–20)
AST SERPL W P-5'-P-CCNC: 19 U/L (ref 9–39)
BASOPHILS # BLD AUTO: 0.05 X10*3/UL (ref 0–0.1)
BASOPHILS NFR BLD AUTO: 0.7 %
BILIRUB SERPL-MCNC: 0.4 MG/DL (ref 0–1.2)
BUN SERPL-MCNC: 16 MG/DL (ref 6–23)
BURR CELLS BLD QL SMEAR: NORMAL
CALCIUM SERPL-MCNC: 9.1 MG/DL (ref 8.6–10.3)
CEA SERPL-MCNC: 8.3 UG/L
CHLORIDE SERPL-SCNC: 105 MMOL/L (ref 98–107)
CO2 SERPL-SCNC: 26 MMOL/L (ref 21–32)
CREAT SERPL-MCNC: 0.86 MG/DL (ref 0.5–1.3)
DACRYOCYTES BLD QL SMEAR: NORMAL
EOSINOPHIL # BLD AUTO: 0.17 X10*3/UL (ref 0–0.4)
EOSINOPHIL NFR BLD AUTO: 2.4 %
ERYTHROCYTE [DISTWIDTH] IN BLOOD BY AUTOMATED COUNT: 17 % (ref 11.5–14.5)
GFR SERPL CREATININE-BSD FRML MDRD: >90 ML/MIN/1.73M*2
GIANT PLATELETS BLD QL SMEAR: NORMAL
GLUCOSE SERPL-MCNC: 86 MG/DL (ref 74–99)
HCT VFR BLD AUTO: 35.1 % (ref 41–52)
HGB BLD-MCNC: 11.2 G/DL (ref 13.5–17.5)
HOWELL-JOLLY BOD BLD QL SMEAR: PRESENT
HYPOCHROMIA BLD QL SMEAR: NORMAL
IMM GRANULOCYTES # BLD AUTO: 0.01 X10*3/UL (ref 0–0.5)
IMM GRANULOCYTES NFR BLD AUTO: 0.1 % (ref 0–0.9)
LYMPHOCYTES # BLD AUTO: 2.76 X10*3/UL (ref 0.8–3)
LYMPHOCYTES NFR BLD AUTO: 39.8 %
MCH RBC QN AUTO: 28.5 PG (ref 26–34)
MCHC RBC AUTO-ENTMCNC: 31.9 G/DL (ref 32–36)
MCV RBC AUTO: 89 FL (ref 80–100)
MONOCYTES # BLD AUTO: 1.39 X10*3/UL (ref 0.05–0.8)
MONOCYTES NFR BLD AUTO: 20 %
NEUTROPHILS # BLD AUTO: 2.56 X10*3/UL (ref 1.6–5.5)
NEUTROPHILS NFR BLD AUTO: 37 %
NRBC BLD-RTO: 0 /100 WBCS (ref 0–0)
OVALOCYTES BLD QL SMEAR: NORMAL
PLATELET # BLD AUTO: 101 X10*3/UL (ref 150–450)
PMV BLD AUTO: ABNORMAL FL
POLYCHROMASIA BLD QL SMEAR: NORMAL
POTASSIUM SERPL-SCNC: 4 MMOL/L (ref 3.5–5.3)
PROT SERPL-MCNC: 7 G/DL (ref 6.4–8.2)
RBC # BLD AUTO: 3.93 X10*6/UL (ref 4.5–5.9)
RBC MORPH BLD: NORMAL
SCHISTOCYTES BLD QL SMEAR: NORMAL
SODIUM SERPL-SCNC: 139 MMOL/L (ref 136–145)
SPHEROCYTES BLD QL SMEAR: NORMAL
STOMATOCYTES BLD QL SMEAR: NORMAL
TARGETS BLD QL SMEAR: NORMAL
WBC # BLD AUTO: 6.9 X10*3/UL (ref 4.4–11.3)

## 2023-11-01 PROCEDURE — 82378 CARCINOEMBRYONIC ANTIGEN: CPT | Performed by: INTERNAL MEDICINE

## 2023-11-01 PROCEDURE — 96375 TX/PRO/DX INJ NEW DRUG ADDON: CPT | Mod: INF

## 2023-11-01 PROCEDURE — 96413 CHEMO IV INFUSION 1 HR: CPT

## 2023-11-01 PROCEDURE — 85025 COMPLETE CBC W/AUTO DIFF WBC: CPT | Performed by: INTERNAL MEDICINE

## 2023-11-01 PROCEDURE — 96411 CHEMO IV PUSH ADDL DRUG: CPT

## 2023-11-01 PROCEDURE — 2500000004 HC RX 250 GENERAL PHARMACY W/ HCPCS (ALT 636 FOR OP/ED): Performed by: INTERNAL MEDICINE

## 2023-11-01 PROCEDURE — 96415 CHEMO IV INFUSION ADDL HR: CPT

## 2023-11-01 PROCEDURE — 96367 TX/PROPH/DG ADDL SEQ IV INF: CPT

## 2023-11-01 PROCEDURE — 80053 COMPREHEN METABOLIC PANEL: CPT | Performed by: INTERNAL MEDICINE

## 2023-11-01 RX ORDER — PALONOSETRON 0.05 MG/ML
0.25 INJECTION, SOLUTION INTRAVENOUS ONCE
Status: COMPLETED | OUTPATIENT
Start: 2023-11-01 | End: 2023-11-01

## 2023-11-01 RX ORDER — HEPARIN 100 UNIT/ML
500 SYRINGE INTRAVENOUS AS NEEDED
Status: CANCELLED | OUTPATIENT
Start: 2023-11-01

## 2023-11-01 RX ORDER — HEPARIN SODIUM,PORCINE/PF 10 UNIT/ML
50 SYRINGE (ML) INTRAVENOUS AS NEEDED
Status: CANCELLED | OUTPATIENT
Start: 2023-11-01

## 2023-11-01 RX ADMIN — OXALIPLATIN 190 MG: 5 INJECTION, SOLUTION INTRAVENOUS at 12:46

## 2023-11-01 RX ADMIN — PALONOSETRON HYDROCHLORIDE 250 MCG: 0.25 INJECTION INTRAVENOUS at 12:13

## 2023-11-01 RX ADMIN — DEXAMETHASONE SODIUM PHOSPHATE 12 MG: 10 INJECTION, SOLUTION INTRAMUSCULAR; INTRAVENOUS at 12:15

## 2023-11-01 RX ADMIN — FLUOROURACIL 5300 MG: 50 INJECTION, SOLUTION INTRAVENOUS at 15:05

## 2023-11-01 ASSESSMENT — PAIN SCALES - GENERAL: PAINLEVEL: 0-NO PAIN

## 2023-11-03 ENCOUNTER — INFUSION (OUTPATIENT)
Dept: HEMATOLOGY/ONCOLOGY | Facility: CLINIC | Age: 73
End: 2023-11-03
Payer: MEDICARE

## 2023-11-03 VITALS
HEART RATE: 56 BPM | DIASTOLIC BLOOD PRESSURE: 83 MMHG | SYSTOLIC BLOOD PRESSURE: 144 MMHG | TEMPERATURE: 98.1 F | RESPIRATION RATE: 18 BRPM | OXYGEN SATURATION: 97 %

## 2023-11-03 DIAGNOSIS — C20 MALIGNANT NEOPLASM OF RECTUM (MULTI): ICD-10-CM

## 2023-11-03 PROCEDURE — 96523 IRRIG DRUG DELIVERY DEVICE: CPT

## 2023-11-03 RX ORDER — HEPARIN 100 UNIT/ML
500 SYRINGE INTRAVENOUS AS NEEDED
Status: DISCONTINUED | OUTPATIENT
Start: 2023-11-03 | End: 2023-11-03 | Stop reason: HOSPADM

## 2023-11-03 RX ORDER — HEPARIN 100 UNIT/ML
500 SYRINGE INTRAVENOUS AS NEEDED
Status: CANCELLED | OUTPATIENT
Start: 2023-11-03

## 2023-11-03 RX ORDER — HEPARIN SODIUM,PORCINE/PF 10 UNIT/ML
50 SYRINGE (ML) INTRAVENOUS AS NEEDED
Status: DISCONTINUED | OUTPATIENT
Start: 2023-11-03 | End: 2023-11-03 | Stop reason: HOSPADM

## 2023-11-03 RX ORDER — HEPARIN SODIUM,PORCINE/PF 10 UNIT/ML
50 SYRINGE (ML) INTRAVENOUS AS NEEDED
Status: CANCELLED | OUTPATIENT
Start: 2023-11-03

## 2023-11-03 ASSESSMENT — PAIN SCALES - GENERAL: PAINLEVEL: 0-NO PAIN

## 2023-11-03 NOTE — PROGRESS NOTES
CADD infusion pump discontinued from 5-FU infusion. Administration completed without incident. Port deaccessed and flushed per order. Patient denies questions or needs at this time

## 2023-11-08 DIAGNOSIS — E05.00 THYROTOXICOSIS WITH DIFFUSE GOITER WITHOUT THYROTOXIC CRISIS OR STORM: Primary | ICD-10-CM

## 2023-11-08 RX ORDER — METHIMAZOLE 5 MG/1
2.5 TABLET ORAL DAILY
Qty: 45 TABLET | Refills: 0 | Status: SHIPPED | OUTPATIENT
Start: 2023-11-08 | End: 2024-01-15 | Stop reason: ALTCHOICE

## 2023-11-14 ENCOUNTER — INFUSION (OUTPATIENT)
Dept: HEMATOLOGY/ONCOLOGY | Facility: CLINIC | Age: 73
End: 2023-11-14
Payer: MEDICARE

## 2023-11-14 ENCOUNTER — OFFICE VISIT (OUTPATIENT)
Dept: HEMATOLOGY/ONCOLOGY | Facility: CLINIC | Age: 73
End: 2023-11-14
Payer: MEDICARE

## 2023-11-14 VITALS
HEART RATE: 46 BPM | TEMPERATURE: 98.1 F | RESPIRATION RATE: 18 BRPM | OXYGEN SATURATION: 100 % | SYSTOLIC BLOOD PRESSURE: 158 MMHG | WEIGHT: 214.95 LBS | DIASTOLIC BLOOD PRESSURE: 83 MMHG | BODY MASS INDEX: 28.37 KG/M2

## 2023-11-14 DIAGNOSIS — E05.00 THYROTOXICOSIS WITH DIFFUSE GOITER WITHOUT THYROTOXIC CRISIS OR STORM: ICD-10-CM

## 2023-11-14 DIAGNOSIS — D69.3 IDIOPATHIC THROMBOCYTOPENIC PURPURA (ITP) (MULTI): Primary | ICD-10-CM

## 2023-11-14 DIAGNOSIS — C20 MALIGNANT NEOPLASM OF RECTUM (MULTI): ICD-10-CM

## 2023-11-14 PROCEDURE — 1159F MED LIST DOCD IN RCRD: CPT | Performed by: INTERNAL MEDICINE

## 2023-11-14 PROCEDURE — 3077F SYST BP >= 140 MM HG: CPT | Performed by: INTERNAL MEDICINE

## 2023-11-14 PROCEDURE — 36415 COLL VENOUS BLD VENIPUNCTURE: CPT | Mod: PO | Performed by: INTERNAL MEDICINE

## 2023-11-14 PROCEDURE — 84443 ASSAY THYROID STIM HORMONE: CPT | Performed by: INTERNAL MEDICINE

## 2023-11-14 PROCEDURE — 99214 OFFICE O/P EST MOD 30 MIN: CPT | Mod: PO | Performed by: INTERNAL MEDICINE

## 2023-11-14 PROCEDURE — 84075 ASSAY ALKALINE PHOSPHATASE: CPT | Performed by: INTERNAL MEDICINE

## 2023-11-14 PROCEDURE — 85027 COMPLETE CBC AUTOMATED: CPT | Performed by: INTERNAL MEDICINE

## 2023-11-14 PROCEDURE — 3079F DIAST BP 80-89 MM HG: CPT | Performed by: INTERNAL MEDICINE

## 2023-11-14 PROCEDURE — 1126F AMNT PAIN NOTED NONE PRSNT: CPT | Performed by: INTERNAL MEDICINE

## 2023-11-14 PROCEDURE — 99214 OFFICE O/P EST MOD 30 MIN: CPT | Performed by: INTERNAL MEDICINE

## 2023-11-14 PROCEDURE — 82378 CARCINOEMBRYONIC ANTIGEN: CPT | Performed by: INTERNAL MEDICINE

## 2023-11-14 PROCEDURE — 80053 COMPREHEN METABOLIC PANEL: CPT | Performed by: INTERNAL MEDICINE

## 2023-11-14 PROCEDURE — 85007 BL SMEAR W/DIFF WBC COUNT: CPT | Performed by: INTERNAL MEDICINE

## 2023-11-14 PROCEDURE — 84439 ASSAY OF FREE THYROXINE: CPT | Performed by: INTERNAL MEDICINE

## 2023-11-14 RX ORDER — PROCHLORPERAZINE MALEATE 10 MG
10 TABLET ORAL EVERY 6 HOURS PRN
Status: CANCELLED | OUTPATIENT
Start: 2023-11-29

## 2023-11-14 RX ORDER — PALONOSETRON 0.05 MG/ML
0.25 INJECTION, SOLUTION INTRAVENOUS ONCE
Status: CANCELLED | OUTPATIENT
Start: 2023-11-29

## 2023-11-14 RX ORDER — DIPHENHYDRAMINE HYDROCHLORIDE 50 MG/ML
50 INJECTION INTRAMUSCULAR; INTRAVENOUS AS NEEDED
Status: CANCELLED | OUTPATIENT
Start: 2023-11-29

## 2023-11-14 RX ORDER — ALBUTEROL SULFATE 0.83 MG/ML
3 SOLUTION RESPIRATORY (INHALATION) AS NEEDED
Status: CANCELLED | OUTPATIENT
Start: 2023-11-29

## 2023-11-14 RX ORDER — FAMOTIDINE 10 MG/ML
20 INJECTION INTRAVENOUS ONCE AS NEEDED
Status: CANCELLED | OUTPATIENT
Start: 2023-11-29

## 2023-11-14 RX ORDER — PROCHLORPERAZINE EDISYLATE 5 MG/ML
10 INJECTION INTRAMUSCULAR; INTRAVENOUS EVERY 6 HOURS PRN
Status: CANCELLED | OUTPATIENT
Start: 2023-11-29

## 2023-11-14 RX ORDER — LORAZEPAM 2 MG/ML
1 INJECTION INTRAMUSCULAR AS NEEDED
Status: CANCELLED | OUTPATIENT
Start: 2023-11-29

## 2023-11-14 RX ORDER — EPINEPHRINE 0.3 MG/.3ML
0.3 INJECTION SUBCUTANEOUS EVERY 5 MIN PRN
Status: CANCELLED | OUTPATIENT
Start: 2023-11-29

## 2023-11-14 ASSESSMENT — ENCOUNTER SYMPTOMS
LOSS OF SENSATION IN FEET: 0
DEPRESSION: 0
OCCASIONAL FEELINGS OF UNSTEADINESS: 0

## 2023-11-14 ASSESSMENT — COLUMBIA-SUICIDE SEVERITY RATING SCALE - C-SSRS
1. IN THE PAST MONTH, HAVE YOU WISHED YOU WERE DEAD OR WISHED YOU COULD GO TO SLEEP AND NOT WAKE UP?: NO
2. HAVE YOU ACTUALLY HAD ANY THOUGHTS OF KILLING YOURSELF?: NO
6. HAVE YOU EVER DONE ANYTHING, STARTED TO DO ANYTHING, OR PREPARED TO DO ANYTHING TO END YOUR LIFE?: NO

## 2023-11-14 ASSESSMENT — PATIENT HEALTH QUESTIONNAIRE - PHQ9
1. LITTLE INTEREST OR PLEASURE IN DOING THINGS: NOT AT ALL
2. FEELING DOWN, DEPRESSED OR HOPELESS: NOT AT ALL
SUM OF ALL RESPONSES TO PHQ9 QUESTIONS 1 AND 2: 0

## 2023-11-14 ASSESSMENT — PAIN SCALES - GENERAL: PAINLEVEL: 0-NO PAIN

## 2023-11-14 NOTE — PROGRESS NOTES
Patient ID: Bobby Holland is a 73 y.o. male from [place], [place].     Referring Physician: Bobby Pickard MD  [id number] [place] [address]    Primary Care Provider: No Assigned PCP Generic Provider, MD    Cancer History:   Treatment Synopsis:    DIAGNOSIS  Rectal Cancer 5/2023     STAGING  [id number]     CURRENT SITES OF DISEASE  Rectum, liver and lung     MOLECULAR GENOMICS    Test Name: "[employer] xT (XT.V4)  Laboratory Name: [facility]  Specimen Source: Colon, rectosigmoid  Collection Date: 15-May-2023  Cancer Type: Invasive adenocarcinoma  Report Id: [id number]    Molecular Findings:  * Gene: KRAS, Variant: Missense variant (exon 2) - GOF, Potentially Actionable, p.G12V (Allele Frequency - 16.6%)  * Gene: APC, Variant: Frameshift - LOF, Biologically Relevant, p.X3255di (Allele Frequency - 26.6%)  * Gene: APC, Variant: Stop gain - LOF, Biologically Relevant, p.R213*, Nonsense (Allele Frequency - 20.2%)  * Gene: FBXW7, Variant: Missense variant - LOF, Biologically Relevant, p.R505C (Allele Frequency - 23.3%)  * Gene: CCND1, Variant: Amplification, Copy number gain, Biologically Relevant  * Gene: FGF3, Variant: Amplification, Copy number gain, Biologically Relevant  * Gene: FGF4, Variant: Amplification, Copy number gain, Biologically Relevant  * Biomarker: Microsatellite Instability, Status: stable  * Biomarker: Tumor Mutation [place] (5.8 Muts/Mb, Percentile: 63.0)  * Disease associated genes/variants with no reportable findings:     * BRAF    * NRAS  * 7 variants of unknown significance    ** End of copied information **     SERUM TUMOR MARKER  CEA 4.4 - June 2023     CURRENT THERAPY  July 2023 - FOLFOX      PRIOR THERAPY  None     DEVICES  mediport     CURRENT PROBLEM LIST   ITP  - spleenectomy 2012   Graves ds  Metastatic colorectal cancer        Treatment History:    73 y.o.  male presenting to the emergency room with hematochezia in early April hemoglobin low taken off Eliquis.  Bleeding  initially resolved.  Anoscopy in May  2023 showed a large circumferential nonobstructing mass 7 cm in size at 9 cm from the anal verge.  Biopsy showed moderately differentiated adenocarcinoma.  MMR was proficient.  CEA was 4.7.  Imaging with Dr. Mccrary which showed lung and liver metastases.   PET scan confirmed FDG avidity of these lesions.          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  Social History:  Bobby reports that he has never smoked. He has never been exposed to tobacco smoke. He does not have any smokeless tobacco history on file. He reports current alcohol use of about 2.0 standard drinks of alcohol per week. He reports that he does not use drugs.  Family History:  No family history on file.  Family Oncology History:  Cancer-related family history is not on file.        SUBJECTIVE:    History of Present Illness:  Bobby Holland is a 73 y.o. male who was referred by Bobyb Pickard MD and presents with Follow-up    Pt had port removed and then treatment delayed by thrombocyutopenia, likely due to ITP.    PT feeling well with minimal residual neuropathy.   Has maintained weight and activity level.        OBJECTIVE:    VS / Pain:  /83   Pulse (!) 46   Temp 36.7 °C (98.1 °F)   Resp 18   Wt 97.5 kg (214 lb 15.2 oz)   SpO2 100%   BMI 28.37 kg/m²   BSA: 2.24 meters squared   Pain Scale: 0    Daily Weight  11/14/23 : 97.5 kg (214 lb 15.2 oz)  11/01/23 : 97.2 kg (214 lb 4.6 oz)  10/25/23 : 96.8 kg (213 lb 6.5 oz)      Physical Exam  Constitutional:       Appearance: He is normal weight.   HENT:      Nose: Nose normal.      Mouth/Throat:      Mouth: Mucous membranes are moist.   Eyes:      Pupils: Pupils are equal, round, and reactive to light.   Cardiovascular:      Rate and Rhythm: Normal rate.      Pulses: Normal pulses.    Abdominal:      General: Abdomen is flat.   Musculoskeletal:         General: Normal range of motion.   Skin:     General: Skin is warm.   Neurological:      General: No focal deficit present.      Mental Status: He is alert.   Psychiatric:         Mood and Affect: Mood normal.         Judgment: Judgment normal.         Performance Status:   ECOG 1    Diagnostic Results         WBC   Date/Time Value Ref Range Status   11/01/2023 11:06 AM 6.9 4.4 - 11.3 x10*3/uL Final   10/25/2023 08:52 AM 7.4 4.4 - 11.3 x10*3/uL Final   10/11/2023 09:15 AM 7.9 4.4 - 11.3 x10*3/uL Final     Hemoglobin   Date Value Ref Range Status   11/01/2023 11.2 (L) 13.5 - 17.5 g/dL Final   10/25/2023 10.9 (L) 13.5 - 17.5 g/dL Final   10/11/2023 11.2 (L) 13.5 - 17.5 g/dL Final     MCV   Date/Time Value Ref Range Status   11/01/2023 11:06 AM 89 80 - 100 fL Final   10/25/2023 08:52 AM 88 80 - 100 fL Final   10/11/2023 09:15 AM 87 80 - 100 fL Final     Platelets   Date/Time Value Ref Range Status   11/01/2023 11:06  (L) 150 - 450 x10*3/uL Final   10/25/2023 08:52 AM 50 (L) 150 - 450 x10*3/uL Final     Comment:     Platelet count verified by smear review.   10/11/2023 09:15  (L) 150 - 450 x10*3/uL Final     Neutrophils Absolute   Date/Time Value Ref Range Status   11/01/2023 11:06 AM 2.56 1.60 - 5.50 x10*3/uL Final     Comment:     Percent differential counts (%) should be interpreted in the context of the absolute cell counts (cells/uL).   10/25/2023 08:52 AM 3.55 1.60 - 5.50 x10*3/uL Final     Comment:     Percent differential counts (%) should be interpreted in the context of the absolute cell counts (cells/uL).   10/11/2023 09:15 AM 4.01 1.60 - 5.50 x10*3/uL Final     Comment:     Percent differential counts (%) should be interpreted in the context of the absolute cell counts (cells/uL).     Bilirubin, Total   Date/Time Value Ref Range Status   11/01/2023 11:06 AM 0.4 0.0 - 1.2 mg/dL Final   10/25/2023 08:52 AM 0.4 0.0 - 1.2 mg/dL  "Final   10/11/2023 09:15 AM 0.4 0.0 - 1.2 mg/dL Final     AST   Date/Time Value Ref Range Status   11/01/2023 11:06 AM 19 9 - 39 U/L Final   10/25/2023 08:52 AM 23 9 - 39 U/L Final   10/11/2023 09:15 AM 20 9 - 39 U/L Final     ALT   Date/Time Value Ref Range Status   11/01/2023 11:06 AM 15 10 - 52 U/L Final     Comment:     Patients treated with Sulfasalazine may generate falsely decreased results for ALT.   10/25/2023 08:52 AM 19 10 - 52 U/L Final     Comment:     Patients treated with Sulfasalazine may generate falsely decreased results for ALT.   10/11/2023 09:15 AM 15 10 - 52 U/L Final     Comment:     Patients treated with Sulfasalazine may generate falsely decreased results for ALT.     Creatinine   Date/Time Value Ref Range Status   11/01/2023 11:06 AM 0.86 0.50 - 1.30 mg/dL Final   10/25/2023 08:52 AM 0.87 0.50 - 1.30 mg/dL Final   10/11/2023 09:15 AM 0.82 0.50 - 1.30 mg/dL Final     Urea Nitrogen   Date/Time Value Ref Range Status   11/01/2023 11:06 AM 16 6 - 23 mg/dL Final   10/25/2023 08:52 AM 18 6 - 23 mg/dL Final   10/11/2023 09:15 AM 14 6 - 23 mg/dL Final     Albumin   Date/Time Value Ref Range Status   11/01/2023 11:06 AM 3.5 3.4 - 5.0 g/dL Final   10/25/2023 08:52 AM 3.5 3.4 - 5.0 g/dL Final   10/11/2023 09:15 AM 3.6 3.4 - 5.0 g/dL Final     No results found for: \"\"  Carcinoembryonic AG   Date/Time Value Ref Range Status   11/01/2023 11:06 AM 8.3 ug/L Final   10/25/2023 08:52 AM 8.4 ug/L Final         === 09/07/23 ===    CT CHEST ABDOMEN PELVIS W IV CONTRAST    - Impression -  Restaging scan for rectal adenocarcinoma:    1. When compared to the prior exam on 05/31/2023, there has been  worsening of tumor burden as evidenced by a few enlarging hepatic and  pulmonary metastases as well as one new hepatic metastasis. There is  persistent circumferential wall thickening of the rectum  corresponding to biopsy-proven adenocarcinoma, no remarkably changed  when compared to the prior study given " differences in distension.  2. Asymmetric nodular soft tissue thickening of the posterior wall of  the gastric fundus similar to multiple prior exams which may in part  be due to underdistention, however may represent focal gastritis.  3. Partially imaged large right inguinal hernia containing loops of  nondilated small bowel, similar to prior study.      I personally reviewed the images/study and I agree with the findings  as stated by resident physician Dr. Mirta Atkins.    MACRO:  None      Assessment/Plan     Assessment:    73 y.o. man with metastatic rectal cancer with liver and lung lesions diagnosed originally in 6/2023.      Plan:  Stage IV rectal Ca:  cycle #4 - 8/30   f/u with Dr. Pickard after cycle #4 with scans   - Obtain NGS/MSI status  - Plan to add Yohana given KRAS mutation if plts remain stable.        H/o ITP -     - s/p splencectomy   -   plt cutoff is 75,000 for chemotherapy   - needs romiplostim every other week during chemo.       Bobby Pickard MD     [facility]/ [facility]  Office: [phone]  Fax: [phone]

## 2023-11-15 ENCOUNTER — INFUSION (OUTPATIENT)
Dept: HEMATOLOGY/ONCOLOGY | Facility: CLINIC | Age: 73
End: 2023-11-15
Payer: MEDICARE

## 2023-11-15 DIAGNOSIS — C20 MALIGNANT NEOPLASM OF RECTUM (MULTI): ICD-10-CM

## 2023-11-15 LAB
ALBUMIN SERPL BCP-MCNC: 3.7 G/DL (ref 3.4–5)
ALP SERPL-CCNC: 91 U/L (ref 33–136)
ALT SERPL W P-5'-P-CCNC: 18 U/L (ref 10–52)
ANION GAP SERPL CALC-SCNC: 12 MMOL/L (ref 10–20)
AST SERPL W P-5'-P-CCNC: 23 U/L (ref 9–39)
BASOPHILS # BLD MANUAL: 0.07 X10*3/UL (ref 0–0.1)
BASOPHILS NFR BLD MANUAL: 0.9 %
BILIRUB SERPL-MCNC: 0.5 MG/DL (ref 0–1.2)
BUN SERPL-MCNC: 18 MG/DL (ref 6–23)
CALCIUM SERPL-MCNC: 9.4 MG/DL (ref 8.6–10.6)
CEA SERPL-MCNC: 8.5 UG/L
CHLORIDE SERPL-SCNC: 103 MMOL/L (ref 98–107)
CO2 SERPL-SCNC: 29 MMOL/L (ref 21–32)
CREAT SERPL-MCNC: 0.8 MG/DL (ref 0.5–1.3)
EOSINOPHIL # BLD MANUAL: 0.35 X10*3/UL (ref 0–0.4)
EOSINOPHIL NFR BLD MANUAL: 4.3 %
ERYTHROCYTE [DISTWIDTH] IN BLOOD BY AUTOMATED COUNT: 16.1 % (ref 11.5–14.5)
GFR SERPL CREATININE-BSD FRML MDRD: >90 ML/MIN/1.73M*2
GLUCOSE SERPL-MCNC: 91 MG/DL (ref 74–99)
HCT VFR BLD AUTO: 31.3 % (ref 41–52)
HGB BLD-MCNC: 11 G/DL (ref 13.5–17.5)
IMM GRANULOCYTES # BLD AUTO: 0.02 X10*3/UL (ref 0–0.5)
IMM GRANULOCYTES NFR BLD AUTO: 0.2 % (ref 0–0.9)
LYMPHOCYTES # BLD MANUAL: 2.72 X10*3/UL (ref 0.8–3)
LYMPHOCYTES NFR BLD MANUAL: 33.6 %
MCH RBC QN AUTO: 32.4 PG (ref 26–34)
MCHC RBC AUTO-ENTMCNC: 35.1 G/DL (ref 32–36)
MCV RBC AUTO: 92 FL (ref 80–100)
MONOCYTES # BLD MANUAL: 0.83 X10*3/UL (ref 0.05–0.8)
MONOCYTES NFR BLD MANUAL: 10.3 %
NEUTS SEG # BLD MANUAL: 4.12 X10*3/UL (ref 1.6–5)
NEUTS SEG NFR BLD MANUAL: 50.9 %
NRBC BLD-RTO: 0.4 /100 WBCS (ref 0–0)
PLATELET # BLD AUTO: 38 X10*3/UL (ref 150–450)
POTASSIUM SERPL-SCNC: 3.6 MMOL/L (ref 3.5–5.3)
PROT SERPL-MCNC: 6.9 G/DL (ref 6.4–8.2)
RBC # BLD AUTO: 3.39 X10*6/UL (ref 4.5–5.9)
RBC MORPH BLD: ABNORMAL
SODIUM SERPL-SCNC: 140 MMOL/L (ref 136–145)
T4 FREE SERPL-MCNC: 1.02 NG/DL (ref 0.78–1.48)
TOTAL CELLS COUNTED BLD: 116
TSH SERPL-ACNC: 6.03 MIU/L (ref 0.44–3.98)
WBC # BLD AUTO: 8.1 X10*3/UL (ref 4.4–11.3)

## 2023-11-15 RX ORDER — EPINEPHRINE 0.3 MG/.3ML
0.3 INJECTION SUBCUTANEOUS EVERY 5 MIN PRN
Status: CANCELLED | OUTPATIENT
Start: 2023-11-17

## 2023-11-15 RX ORDER — FAMOTIDINE 10 MG/ML
20 INJECTION INTRAVENOUS ONCE AS NEEDED
Status: CANCELLED | OUTPATIENT
Start: 2023-11-17

## 2023-11-15 RX ORDER — DIPHENHYDRAMINE HYDROCHLORIDE 50 MG/ML
50 INJECTION INTRAMUSCULAR; INTRAVENOUS AS NEEDED
Status: CANCELLED | OUTPATIENT
Start: 2023-11-17

## 2023-11-15 RX ORDER — ALBUTEROL SULFATE 0.83 MG/ML
3 SOLUTION RESPIRATORY (INHALATION) AS NEEDED
Status: CANCELLED | OUTPATIENT
Start: 2023-11-17

## 2023-11-15 NOTE — PROGRESS NOTES
Patient arrived to clinic this AM for treatment. Labs drawn 11/14/23 PLT=38. Dr. Pickard notified. Per Dr. Pickard- Hold treatment today, his team will work on trying to get the patient set up for Nplate hopefully this week. Patient and wife notified of plan. Both verbalized understanding. Patient encouraged to call with any questions.

## 2023-11-17 DIAGNOSIS — C20 MALIGNANT NEOPLASM OF RECTUM (MULTI): Primary | ICD-10-CM

## 2023-11-21 ENCOUNTER — TELEPHONE (OUTPATIENT)
Dept: ADMISSION | Facility: HOSPITAL | Age: 73
End: 2023-11-21
Payer: MEDICARE

## 2023-11-21 NOTE — TELEPHONE ENCOUNTER
Pt has apt questions, left this on our RN Triage VM. This RN plans to call patient back shortly to discuss

## 2023-11-21 NOTE — TELEPHONE ENCOUNTER
I called Bobby to discuss apt questions, I got his personalized VM. I left him a message to call our office back to discuss further, will await his return call.

## 2023-11-21 NOTE — TELEPHONE ENCOUNTER
Attempted to call patient again to discuss apts, I got his personalized VM, asked that he call our office back to discuss when he has a moment.

## 2023-11-24 ENCOUNTER — INFUSION (OUTPATIENT)
Dept: HEMATOLOGY/ONCOLOGY | Facility: CLINIC | Age: 73
End: 2023-11-24
Payer: MEDICARE

## 2023-11-24 ENCOUNTER — APPOINTMENT (OUTPATIENT)
Dept: HEMATOLOGY/ONCOLOGY | Facility: CLINIC | Age: 73
End: 2023-11-24
Payer: MEDICARE

## 2023-11-24 ENCOUNTER — LAB (OUTPATIENT)
Dept: LAB | Facility: CLINIC | Age: 73
End: 2023-11-24
Payer: MEDICARE

## 2023-11-24 VITALS
RESPIRATION RATE: 18 BRPM | WEIGHT: 213.63 LBS | TEMPERATURE: 98.6 F | DIASTOLIC BLOOD PRESSURE: 93 MMHG | BODY MASS INDEX: 28.19 KG/M2 | SYSTOLIC BLOOD PRESSURE: 171 MMHG | HEART RATE: 57 BPM

## 2023-11-24 DIAGNOSIS — D69.3 IDIOPATHIC THROMBOCYTOPENIC PURPURA (ITP) (MULTI): ICD-10-CM

## 2023-11-24 DIAGNOSIS — C20 MALIGNANT NEOPLASM OF RECTUM (MULTI): Primary | ICD-10-CM

## 2023-11-24 LAB
ALBUMIN SERPL BCP-MCNC: 4 G/DL (ref 3.4–5)
ALP SERPL-CCNC: 107 U/L (ref 33–136)
ALT SERPL W P-5'-P-CCNC: 19 U/L (ref 10–52)
ANION GAP SERPL CALC-SCNC: 15 MMOL/L (ref 10–20)
AST SERPL W P-5'-P-CCNC: 28 U/L (ref 9–39)
BASOPHILS # BLD AUTO: 0.09 X10*3/UL (ref 0–0.1)
BASOPHILS NFR BLD AUTO: 1.1 %
BILIRUB SERPL-MCNC: 0.4 MG/DL (ref 0–1.2)
BUN SERPL-MCNC: 17 MG/DL (ref 6–23)
CALCIUM SERPL-MCNC: 9.8 MG/DL (ref 8.6–10.6)
CHLORIDE SERPL-SCNC: 102 MMOL/L (ref 98–107)
CO2 SERPL-SCNC: 27 MMOL/L (ref 21–32)
CREAT SERPL-MCNC: 0.88 MG/DL (ref 0.5–1.3)
EOSINOPHIL # BLD AUTO: 0.24 X10*3/UL (ref 0–0.4)
EOSINOPHIL NFR BLD AUTO: 2.8 %
ERYTHROCYTE [DISTWIDTH] IN BLOOD BY AUTOMATED COUNT: 15.3 % (ref 11.5–14.5)
GFR SERPL CREATININE-BSD FRML MDRD: 90 ML/MIN/1.73M*2
GLUCOSE SERPL-MCNC: 90 MG/DL (ref 74–99)
HCT VFR BLD AUTO: 39 % (ref 41–52)
HGB BLD-MCNC: 12.4 G/DL (ref 13.5–17.5)
IMM GRANULOCYTES # BLD AUTO: 0.02 X10*3/UL (ref 0–0.5)
IMM GRANULOCYTES NFR BLD AUTO: 0.2 % (ref 0–0.9)
LYMPHOCYTES # BLD AUTO: 3.27 X10*3/UL (ref 0.8–3)
LYMPHOCYTES NFR BLD AUTO: 38.5 %
MCH RBC QN AUTO: 28.6 PG (ref 26–34)
MCHC RBC AUTO-ENTMCNC: 31.8 G/DL (ref 32–36)
MCV RBC AUTO: 90 FL (ref 80–100)
MONOCYTES # BLD AUTO: 1.33 X10*3/UL (ref 0.05–0.8)
MONOCYTES NFR BLD AUTO: 15.7 %
NEUTROPHILS # BLD AUTO: 3.54 X10*3/UL (ref 1.6–5.5)
NEUTROPHILS NFR BLD AUTO: 41.7 %
NRBC BLD-RTO: ABNORMAL /100{WBCS}
PLATELET # BLD AUTO: 109 X10*3/UL (ref 150–450)
POTASSIUM SERPL-SCNC: 5.2 MMOL/L (ref 3.5–5.3)
PROT SERPL-MCNC: 7.8 G/DL (ref 6.4–8.2)
RBC # BLD AUTO: 4.34 X10*6/UL (ref 4.5–5.9)
SODIUM SERPL-SCNC: 139 MMOL/L (ref 136–145)
WBC # BLD AUTO: 8.5 X10*3/UL (ref 4.4–11.3)

## 2023-11-24 PROCEDURE — 36415 COLL VENOUS BLD VENIPUNCTURE: CPT

## 2023-11-24 PROCEDURE — 2500000004 HC RX 250 GENERAL PHARMACY W/ HCPCS (ALT 636 FOR OP/ED): Mod: JW,JG | Performed by: INTERNAL MEDICINE

## 2023-11-24 PROCEDURE — 80053 COMPREHEN METABOLIC PANEL: CPT | Performed by: INTERNAL MEDICINE

## 2023-11-24 PROCEDURE — 96372 THER/PROPH/DIAG INJ SC/IM: CPT

## 2023-11-24 PROCEDURE — 85025 COMPLETE CBC W/AUTO DIFF WBC: CPT

## 2023-11-24 RX ORDER — ALBUTEROL SULFATE 0.83 MG/ML
3 SOLUTION RESPIRATORY (INHALATION) AS NEEDED
Status: CANCELLED | OUTPATIENT
Start: 2023-11-29

## 2023-11-24 RX ORDER — EPINEPHRINE 0.3 MG/.3ML
0.3 INJECTION SUBCUTANEOUS EVERY 5 MIN PRN
Status: CANCELLED | OUTPATIENT
Start: 2023-12-01

## 2023-11-24 RX ORDER — PALONOSETRON 0.05 MG/ML
0.25 INJECTION, SOLUTION INTRAVENOUS ONCE
Status: CANCELLED | OUTPATIENT
Start: 2023-11-29

## 2023-11-24 RX ORDER — LORAZEPAM 2 MG/ML
1 INJECTION INTRAMUSCULAR AS NEEDED
Status: CANCELLED | OUTPATIENT
Start: 2023-11-29

## 2023-11-24 RX ORDER — EPINEPHRINE 0.3 MG/.3ML
0.3 INJECTION SUBCUTANEOUS EVERY 5 MIN PRN
Status: CANCELLED | OUTPATIENT
Start: 2023-11-29

## 2023-11-24 RX ORDER — ALBUTEROL SULFATE 0.83 MG/ML
3 SOLUTION RESPIRATORY (INHALATION) AS NEEDED
Status: CANCELLED | OUTPATIENT
Start: 2023-12-01

## 2023-11-24 RX ORDER — PROCHLORPERAZINE MALEATE 10 MG
10 TABLET ORAL EVERY 6 HOURS PRN
Status: CANCELLED | OUTPATIENT
Start: 2023-11-29

## 2023-11-24 RX ORDER — PROCHLORPERAZINE EDISYLATE 5 MG/ML
10 INJECTION INTRAMUSCULAR; INTRAVENOUS EVERY 6 HOURS PRN
Status: CANCELLED | OUTPATIENT
Start: 2023-11-29

## 2023-11-24 RX ORDER — FAMOTIDINE 10 MG/ML
20 INJECTION INTRAVENOUS ONCE AS NEEDED
Status: CANCELLED | OUTPATIENT
Start: 2023-12-01

## 2023-11-24 RX ORDER — DIPHENHYDRAMINE HYDROCHLORIDE 50 MG/ML
50 INJECTION INTRAMUSCULAR; INTRAVENOUS AS NEEDED
Status: CANCELLED | OUTPATIENT
Start: 2023-11-29

## 2023-11-24 RX ORDER — DIPHENHYDRAMINE HYDROCHLORIDE 50 MG/ML
50 INJECTION INTRAMUSCULAR; INTRAVENOUS AS NEEDED
Status: CANCELLED | OUTPATIENT
Start: 2023-12-01

## 2023-11-24 RX ORDER — FAMOTIDINE 10 MG/ML
20 INJECTION INTRAVENOUS ONCE AS NEEDED
Status: CANCELLED | OUTPATIENT
Start: 2023-11-29

## 2023-11-24 RX ADMIN — ROMIPLOSTIM 195 MCG: 250 INJECTION, POWDER, LYOPHILIZED, FOR SOLUTION SUBCUTANEOUS at 16:25

## 2023-11-24 ASSESSMENT — PAIN SCALES - GENERAL: PAINLEVEL: 0-NO PAIN

## 2023-11-28 ENCOUNTER — APPOINTMENT (OUTPATIENT)
Dept: HEMATOLOGY/ONCOLOGY | Facility: CLINIC | Age: 73
End: 2023-11-28
Payer: MEDICARE

## 2023-11-29 ENCOUNTER — INFUSION (OUTPATIENT)
Dept: HEMATOLOGY/ONCOLOGY | Facility: CLINIC | Age: 73
End: 2023-11-29
Payer: MEDICARE

## 2023-11-29 VITALS
RESPIRATION RATE: 18 BRPM | TEMPERATURE: 97.3 F | HEART RATE: 54 BPM | SYSTOLIC BLOOD PRESSURE: 144 MMHG | BODY MASS INDEX: 28.05 KG/M2 | WEIGHT: 212.52 LBS | OXYGEN SATURATION: 94 % | DIASTOLIC BLOOD PRESSURE: 84 MMHG

## 2023-11-29 DIAGNOSIS — C20 MALIGNANT NEOPLASM OF RECTUM (MULTI): ICD-10-CM

## 2023-11-29 LAB
ALBUMIN SERPL BCP-MCNC: 3.5 G/DL (ref 3.4–5)
ALP SERPL-CCNC: 97 U/L (ref 33–136)
ALT SERPL W P-5'-P-CCNC: 14 U/L (ref 10–52)
ANION GAP SERPL CALC-SCNC: 13 MMOL/L (ref 10–20)
AST SERPL W P-5'-P-CCNC: 18 U/L (ref 9–39)
BASOPHILS # BLD AUTO: 0.09 X10*3/UL (ref 0–0.1)
BASOPHILS NFR BLD AUTO: 1 %
BILIRUB SERPL-MCNC: 0.4 MG/DL (ref 0–1.2)
BUN SERPL-MCNC: 17 MG/DL (ref 6–23)
CALCIUM SERPL-MCNC: 9.2 MG/DL (ref 8.6–10.3)
CEA SERPL-MCNC: 13.7 UG/L
CHLORIDE SERPL-SCNC: 103 MMOL/L (ref 98–107)
CO2 SERPL-SCNC: 27 MMOL/L (ref 21–32)
CREAT SERPL-MCNC: 0.87 MG/DL (ref 0.5–1.3)
EOSINOPHIL # BLD AUTO: 0.21 X10*3/UL (ref 0–0.4)
EOSINOPHIL NFR BLD AUTO: 2.4 %
ERYTHROCYTE [DISTWIDTH] IN BLOOD BY AUTOMATED COUNT: 15 % (ref 11.5–14.5)
GFR SERPL CREATININE-BSD FRML MDRD: >90 ML/MIN/1.73M*2
GLUCOSE SERPL-MCNC: 88 MG/DL (ref 74–99)
HCT VFR BLD AUTO: 34.5 % (ref 41–52)
HGB BLD-MCNC: 10.9 G/DL (ref 13.5–17.5)
IMM GRANULOCYTES # BLD AUTO: 0.04 X10*3/UL (ref 0–0.5)
IMM GRANULOCYTES NFR BLD AUTO: 0.4 % (ref 0–0.9)
LYMPHOCYTES # BLD AUTO: 2.54 X10*3/UL (ref 0.8–3)
LYMPHOCYTES NFR BLD AUTO: 28.4 %
MCH RBC QN AUTO: 27.9 PG (ref 26–34)
MCHC RBC AUTO-ENTMCNC: 31.6 G/DL (ref 32–36)
MCV RBC AUTO: 89 FL (ref 80–100)
MONOCYTES # BLD AUTO: 1.1 X10*3/UL (ref 0.05–0.8)
MONOCYTES NFR BLD AUTO: 12.3 %
NEUTROPHILS # BLD AUTO: 4.95 X10*3/UL (ref 1.6–5.5)
NEUTROPHILS NFR BLD AUTO: 55.5 %
NRBC BLD-RTO: 0 /100 WBCS (ref 0–0)
PLATELET # BLD AUTO: 157 X10*3/UL (ref 150–450)
POTASSIUM SERPL-SCNC: 3.8 MMOL/L (ref 3.5–5.3)
PROT SERPL-MCNC: 6.9 G/DL (ref 6.4–8.2)
RBC # BLD AUTO: 3.9 X10*6/UL (ref 4.5–5.9)
SODIUM SERPL-SCNC: 139 MMOL/L (ref 136–145)
WBC # BLD AUTO: 8.9 X10*3/UL (ref 4.4–11.3)

## 2023-11-29 PROCEDURE — 96375 TX/PRO/DX INJ NEW DRUG ADDON: CPT | Mod: INF

## 2023-11-29 PROCEDURE — 96413 CHEMO IV INFUSION 1 HR: CPT

## 2023-11-29 PROCEDURE — 96411 CHEMO IV PUSH ADDL DRUG: CPT

## 2023-11-29 PROCEDURE — 36415 COLL VENOUS BLD VENIPUNCTURE: CPT

## 2023-11-29 PROCEDURE — 2500000004 HC RX 250 GENERAL PHARMACY W/ HCPCS (ALT 636 FOR OP/ED): Mod: JZ | Performed by: INTERNAL MEDICINE

## 2023-11-29 PROCEDURE — 85025 COMPLETE CBC W/AUTO DIFF WBC: CPT | Performed by: INTERNAL MEDICINE

## 2023-11-29 PROCEDURE — 80053 COMPREHEN METABOLIC PANEL: CPT | Performed by: INTERNAL MEDICINE

## 2023-11-29 PROCEDURE — 82378 CARCINOEMBRYONIC ANTIGEN: CPT | Performed by: INTERNAL MEDICINE

## 2023-11-29 PROCEDURE — 96415 CHEMO IV INFUSION ADDL HR: CPT

## 2023-11-29 RX ORDER — ALBUTEROL SULFATE 0.83 MG/ML
3 SOLUTION RESPIRATORY (INHALATION) AS NEEDED
Status: DISCONTINUED | OUTPATIENT
Start: 2023-11-29 | End: 2023-11-29 | Stop reason: HOSPADM

## 2023-11-29 RX ORDER — HEPARIN SODIUM,PORCINE/PF 10 UNIT/ML
50 SYRINGE (ML) INTRAVENOUS AS NEEDED
Status: DISCONTINUED | OUTPATIENT
Start: 2023-11-29 | End: 2023-11-29 | Stop reason: HOSPADM

## 2023-11-29 RX ORDER — HEPARIN 100 UNIT/ML
500 SYRINGE INTRAVENOUS AS NEEDED
Status: DISCONTINUED | OUTPATIENT
Start: 2023-11-29 | End: 2023-11-29 | Stop reason: HOSPADM

## 2023-11-29 RX ORDER — HEPARIN 100 UNIT/ML
500 SYRINGE INTRAVENOUS AS NEEDED
Status: CANCELLED | OUTPATIENT
Start: 2023-11-29

## 2023-11-29 RX ORDER — PALONOSETRON 0.05 MG/ML
0.25 INJECTION, SOLUTION INTRAVENOUS ONCE
Status: COMPLETED | OUTPATIENT
Start: 2023-11-29 | End: 2023-11-29

## 2023-11-29 RX ORDER — PROCHLORPERAZINE EDISYLATE 5 MG/ML
10 INJECTION INTRAMUSCULAR; INTRAVENOUS EVERY 6 HOURS PRN
Status: DISCONTINUED | OUTPATIENT
Start: 2023-11-29 | End: 2023-11-29 | Stop reason: HOSPADM

## 2023-11-29 RX ORDER — EPINEPHRINE 0.3 MG/.3ML
0.3 INJECTION SUBCUTANEOUS EVERY 5 MIN PRN
Status: DISCONTINUED | OUTPATIENT
Start: 2023-11-29 | End: 2023-11-29 | Stop reason: HOSPADM

## 2023-11-29 RX ORDER — DIPHENHYDRAMINE HYDROCHLORIDE 50 MG/ML
50 INJECTION INTRAMUSCULAR; INTRAVENOUS AS NEEDED
Status: DISCONTINUED | OUTPATIENT
Start: 2023-11-29 | End: 2023-11-29 | Stop reason: HOSPADM

## 2023-11-29 RX ORDER — LORAZEPAM 2 MG/ML
1 INJECTION INTRAMUSCULAR AS NEEDED
Status: DISCONTINUED | OUTPATIENT
Start: 2023-11-29 | End: 2023-11-29 | Stop reason: HOSPADM

## 2023-11-29 RX ORDER — FAMOTIDINE 10 MG/ML
20 INJECTION INTRAVENOUS ONCE AS NEEDED
Status: DISCONTINUED | OUTPATIENT
Start: 2023-11-29 | End: 2023-11-29 | Stop reason: HOSPADM

## 2023-11-29 RX ORDER — PROCHLORPERAZINE MALEATE 10 MG
10 TABLET ORAL EVERY 6 HOURS PRN
Status: DISCONTINUED | OUTPATIENT
Start: 2023-11-29 | End: 2023-11-29 | Stop reason: HOSPADM

## 2023-11-29 RX ORDER — HEPARIN SODIUM,PORCINE/PF 10 UNIT/ML
50 SYRINGE (ML) INTRAVENOUS AS NEEDED
Status: CANCELLED | OUTPATIENT
Start: 2023-11-29

## 2023-11-29 RX ADMIN — OXALIPLATIN 190 MG: 5 INJECTION, SOLUTION INTRAVENOUS at 12:45

## 2023-11-29 RX ADMIN — FLUOROURACIL 5300 MG: 50 INJECTION, SOLUTION INTRAVENOUS at 14:48

## 2023-11-29 RX ADMIN — PALONOSETRON HYDROCHLORIDE 250 MCG: 0.25 INJECTION INTRAVENOUS at 12:21

## 2023-11-29 RX ADMIN — DEXAMETHASONE SODIUM PHOSPHATE 12 MG: 10 INJECTION, SOLUTION INTRAMUSCULAR; INTRAVENOUS at 12:21

## 2023-11-29 ASSESSMENT — PAIN SCALES - GENERAL: PAINLEVEL: 0-NO PAIN

## 2023-11-29 NOTE — PROGRESS NOTES
Dr. Pickard notified pt does not any future appointments other than for Nplate, need folfox orders signed so pt can be scheduled , also need orders placed for follow up appt, Dr. Pickard placed orders for future chemo and MD appts

## 2023-11-30 ENCOUNTER — APPOINTMENT (OUTPATIENT)
Dept: HEMATOLOGY/ONCOLOGY | Facility: CLINIC | Age: 73
End: 2023-11-30
Payer: MEDICARE

## 2023-12-01 ENCOUNTER — INFUSION (OUTPATIENT)
Dept: HEMATOLOGY/ONCOLOGY | Facility: CLINIC | Age: 73
End: 2023-12-01
Payer: MEDICARE

## 2023-12-01 VITALS
DIASTOLIC BLOOD PRESSURE: 77 MMHG | SYSTOLIC BLOOD PRESSURE: 138 MMHG | TEMPERATURE: 97.7 F | OXYGEN SATURATION: 93 % | RESPIRATION RATE: 18 BRPM | HEART RATE: 51 BPM

## 2023-12-01 DIAGNOSIS — D69.3 IDIOPATHIC THROMBOCYTOPENIC PURPURA (ITP) (MULTI): ICD-10-CM

## 2023-12-01 DIAGNOSIS — C20 MALIGNANT NEOPLASM OF RECTUM (MULTI): ICD-10-CM

## 2023-12-01 PROCEDURE — 2500000004 HC RX 250 GENERAL PHARMACY W/ HCPCS (ALT 636 FOR OP/ED): Performed by: INTERNAL MEDICINE

## 2023-12-01 PROCEDURE — 96372 THER/PROPH/DIAG INJ SC/IM: CPT

## 2023-12-01 RX ORDER — ALBUTEROL SULFATE 0.83 MG/ML
3 SOLUTION RESPIRATORY (INHALATION) AS NEEDED
Status: CANCELLED | OUTPATIENT
Start: 2023-12-08

## 2023-12-01 RX ORDER — DIPHENHYDRAMINE HYDROCHLORIDE 50 MG/ML
50 INJECTION INTRAMUSCULAR; INTRAVENOUS AS NEEDED
Status: CANCELLED | OUTPATIENT
Start: 2023-12-08

## 2023-12-01 RX ORDER — HEPARIN SODIUM,PORCINE/PF 10 UNIT/ML
50 SYRINGE (ML) INTRAVENOUS AS NEEDED
Status: CANCELLED | OUTPATIENT
Start: 2023-12-01

## 2023-12-01 RX ORDER — EPINEPHRINE 0.3 MG/.3ML
0.3 INJECTION SUBCUTANEOUS EVERY 5 MIN PRN
Status: CANCELLED | OUTPATIENT
Start: 2023-12-08

## 2023-12-01 RX ORDER — FAMOTIDINE 10 MG/ML
20 INJECTION INTRAVENOUS ONCE AS NEEDED
Status: CANCELLED | OUTPATIENT
Start: 2023-12-08

## 2023-12-01 RX ORDER — HEPARIN SODIUM,PORCINE/PF 10 UNIT/ML
50 SYRINGE (ML) INTRAVENOUS AS NEEDED
Status: DISCONTINUED | OUTPATIENT
Start: 2023-12-01 | End: 2023-12-01 | Stop reason: HOSPADM

## 2023-12-01 RX ORDER — HEPARIN 100 UNIT/ML
500 SYRINGE INTRAVENOUS AS NEEDED
Status: DISCONTINUED | OUTPATIENT
Start: 2023-12-01 | End: 2023-12-01 | Stop reason: HOSPADM

## 2023-12-01 RX ORDER — HEPARIN 100 UNIT/ML
500 SYRINGE INTRAVENOUS AS NEEDED
Status: CANCELLED | OUTPATIENT
Start: 2023-12-01

## 2023-12-01 RX ADMIN — HEPARIN 500 UNITS: 100 SYRINGE at 13:14

## 2023-12-01 RX ADMIN — ROMIPLOSTIM 195 MCG: 250 INJECTION, POWDER, LYOPHILIZED, FOR SOLUTION SUBCUTANEOUS at 13:35

## 2023-12-01 ASSESSMENT — PAIN SCALES - GENERAL: PAINLEVEL: 0-NO PAIN

## 2023-12-08 ENCOUNTER — INFUSION (OUTPATIENT)
Dept: HEMATOLOGY/ONCOLOGY | Facility: CLINIC | Age: 73
End: 2023-12-08
Payer: MEDICARE

## 2023-12-08 ENCOUNTER — APPOINTMENT (OUTPATIENT)
Dept: HEMATOLOGY/ONCOLOGY | Facility: CLINIC | Age: 73
End: 2023-12-08
Payer: MEDICARE

## 2023-12-08 ENCOUNTER — LAB (OUTPATIENT)
Dept: LAB | Facility: CLINIC | Age: 73
End: 2023-12-08
Payer: MEDICARE

## 2023-12-08 DIAGNOSIS — D69.3 IDIOPATHIC THROMBOCYTOPENIC PURPURA (ITP) (MULTI): ICD-10-CM

## 2023-12-08 LAB
ALBUMIN SERPL BCP-MCNC: 3.7 G/DL (ref 3.4–5)
ALP SERPL-CCNC: 97 U/L (ref 33–136)
ALT SERPL W P-5'-P-CCNC: 19 U/L (ref 10–52)
ANION GAP SERPL CALC-SCNC: 12 MMOL/L (ref 10–20)
AST SERPL W P-5'-P-CCNC: 24 U/L (ref 9–39)
BASOPHILS # BLD AUTO: 0.04 X10*3/UL (ref 0–0.1)
BASOPHILS NFR BLD AUTO: 0.4 %
BILIRUB SERPL-MCNC: 0.4 MG/DL (ref 0–1.2)
BUN SERPL-MCNC: 17 MG/DL (ref 6–23)
CALCIUM SERPL-MCNC: 9.7 MG/DL (ref 8.6–10.3)
CHLORIDE SERPL-SCNC: 103 MMOL/L (ref 98–107)
CO2 SERPL-SCNC: 30 MMOL/L (ref 21–32)
CREAT SERPL-MCNC: 0.91 MG/DL (ref 0.5–1.3)
EOSINOPHIL # BLD AUTO: 0.18 X10*3/UL (ref 0–0.4)
EOSINOPHIL NFR BLD AUTO: 1.6 %
ERYTHROCYTE [DISTWIDTH] IN BLOOD BY AUTOMATED COUNT: 14.9 % (ref 11.5–14.5)
GFR SERPL CREATININE-BSD FRML MDRD: 88 ML/MIN/1.73M*2
GLUCOSE SERPL-MCNC: 98 MG/DL (ref 74–99)
HCT VFR BLD AUTO: 35 % (ref 41–52)
HGB BLD-MCNC: 11 G/DL (ref 13.5–17.5)
IMM GRANULOCYTES # BLD AUTO: 0.08 X10*3/UL (ref 0–0.5)
IMM GRANULOCYTES NFR BLD AUTO: 0.7 % (ref 0–0.9)
LYMPHOCYTES # BLD AUTO: 2.58 X10*3/UL (ref 0.8–3)
LYMPHOCYTES NFR BLD AUTO: 23.6 %
MCH RBC QN AUTO: 28.1 PG (ref 26–34)
MCHC RBC AUTO-ENTMCNC: 31.4 G/DL (ref 32–36)
MCV RBC AUTO: 89 FL (ref 80–100)
MONOCYTES # BLD AUTO: 1.4 X10*3/UL (ref 0.05–0.8)
MONOCYTES NFR BLD AUTO: 12.8 %
NEUTROPHILS # BLD AUTO: 6.64 X10*3/UL (ref 1.6–5.5)
NEUTROPHILS NFR BLD AUTO: 60.9 %
NRBC BLD-RTO: 2.4 /100 WBCS (ref 0–0)
PLATELET # BLD AUTO: 547 X10*3/UL (ref 150–450)
POTASSIUM SERPL-SCNC: 4.2 MMOL/L (ref 3.5–5.3)
PROT SERPL-MCNC: 7.3 G/DL (ref 6.4–8.2)
RBC # BLD AUTO: 3.92 X10*6/UL (ref 4.5–5.9)
SODIUM SERPL-SCNC: 141 MMOL/L (ref 136–145)
WBC # BLD AUTO: 10.9 X10*3/UL (ref 4.4–11.3)

## 2023-12-08 PROCEDURE — 84075 ASSAY ALKALINE PHOSPHATASE: CPT

## 2023-12-08 PROCEDURE — 85025 COMPLETE CBC W/AUTO DIFF WBC: CPT

## 2023-12-12 ENCOUNTER — HOSPITAL ENCOUNTER (OUTPATIENT)
Dept: RADIOLOGY | Facility: HOSPITAL | Age: 73
Discharge: HOME | End: 2023-12-12
Payer: MEDICARE

## 2023-12-12 DIAGNOSIS — C20 MALIGNANT NEOPLASM OF RECTUM (MULTI): ICD-10-CM

## 2023-12-12 PROCEDURE — 71260 CT THORAX DX C+: CPT

## 2023-12-12 PROCEDURE — 2550000001 HC RX 255 CONTRASTS: Performed by: INTERNAL MEDICINE

## 2023-12-12 RX ADMIN — IOHEXOL 75 ML: 350 INJECTION, SOLUTION INTRAVENOUS at 12:26

## 2023-12-13 ENCOUNTER — INFUSION (OUTPATIENT)
Dept: HEMATOLOGY/ONCOLOGY | Facility: CLINIC | Age: 73
End: 2023-12-13
Payer: MEDICARE

## 2023-12-13 VITALS
WEIGHT: 211.75 LBS | HEART RATE: 45 BPM | TEMPERATURE: 97.2 F | DIASTOLIC BLOOD PRESSURE: 79 MMHG | BODY MASS INDEX: 27.94 KG/M2 | SYSTOLIC BLOOD PRESSURE: 153 MMHG

## 2023-12-13 DIAGNOSIS — C20 MALIGNANT NEOPLASM OF RECTUM (MULTI): ICD-10-CM

## 2023-12-13 DIAGNOSIS — C20 MALIGNANT NEOPLASM OF RECTUM (MULTI): Primary | ICD-10-CM

## 2023-12-13 LAB
ALBUMIN SERPL BCP-MCNC: 3.7 G/DL (ref 3.4–5)
ALP SERPL-CCNC: 90 U/L (ref 33–136)
ALT SERPL W P-5'-P-CCNC: 19 U/L (ref 10–52)
ANION GAP SERPL CALC-SCNC: 11 MMOL/L (ref 10–20)
AST SERPL W P-5'-P-CCNC: 24 U/L (ref 9–39)
BASOPHILS # BLD AUTO: 0.08 X10*3/UL (ref 0–0.1)
BASOPHILS NFR BLD AUTO: 1 %
BILIRUB SERPL-MCNC: 0.5 MG/DL (ref 0–1.2)
BUN SERPL-MCNC: 14 MG/DL (ref 6–23)
CALCIUM SERPL-MCNC: 9.4 MG/DL (ref 8.6–10.3)
CEA SERPL-MCNC: 11.3 UG/L
CHLORIDE SERPL-SCNC: 103 MMOL/L (ref 98–107)
CO2 SERPL-SCNC: 28 MMOL/L (ref 21–32)
CREAT SERPL-MCNC: 0.85 MG/DL (ref 0.5–1.3)
EOSINOPHIL # BLD AUTO: 0.2 X10*3/UL (ref 0–0.4)
EOSINOPHIL NFR BLD AUTO: 2.6 %
ERYTHROCYTE [DISTWIDTH] IN BLOOD BY AUTOMATED COUNT: 14.5 % (ref 11.5–14.5)
GFR SERPL CREATININE-BSD FRML MDRD: >90 ML/MIN/1.73M*2
GLUCOSE SERPL-MCNC: 91 MG/DL (ref 74–99)
HCT VFR BLD AUTO: 34.9 % (ref 41–52)
HGB BLD-MCNC: 11.1 G/DL (ref 13.5–17.5)
IMM GRANULOCYTES # BLD AUTO: 0.02 X10*3/UL (ref 0–0.5)
IMM GRANULOCYTES NFR BLD AUTO: 0.3 % (ref 0–0.9)
LYMPHOCYTES # BLD AUTO: 2.6 X10*3/UL (ref 0.8–3)
LYMPHOCYTES NFR BLD AUTO: 33.7 %
MCH RBC QN AUTO: 27.8 PG (ref 26–34)
MCHC RBC AUTO-ENTMCNC: 31.8 G/DL (ref 32–36)
MCV RBC AUTO: 87 FL (ref 80–100)
MONOCYTES # BLD AUTO: 1.43 X10*3/UL (ref 0.05–0.8)
MONOCYTES NFR BLD AUTO: 18.5 %
NEUTROPHILS # BLD AUTO: 3.38 X10*3/UL (ref 1.6–5.5)
NEUTROPHILS NFR BLD AUTO: 43.9 %
NRBC BLD-RTO: 0.3 /100 WBCS (ref 0–0)
PLATELET # BLD AUTO: 590 X10*3/UL (ref 150–450)
POTASSIUM SERPL-SCNC: 4 MMOL/L (ref 3.5–5.3)
PROT SERPL-MCNC: 7.3 G/DL (ref 6.4–8.2)
RBC # BLD AUTO: 4 X10*6/UL (ref 4.5–5.9)
SODIUM SERPL-SCNC: 138 MMOL/L (ref 136–145)
WBC # BLD AUTO: 7.7 X10*3/UL (ref 4.4–11.3)

## 2023-12-13 PROCEDURE — 96411 CHEMO IV PUSH ADDL DRUG: CPT

## 2023-12-13 PROCEDURE — 82378 CARCINOEMBRYONIC ANTIGEN: CPT | Performed by: INTERNAL MEDICINE

## 2023-12-13 PROCEDURE — 2500000004 HC RX 250 GENERAL PHARMACY W/ HCPCS (ALT 636 FOR OP/ED): Performed by: INTERNAL MEDICINE

## 2023-12-13 PROCEDURE — 84075 ASSAY ALKALINE PHOSPHATASE: CPT | Performed by: INTERNAL MEDICINE

## 2023-12-13 PROCEDURE — 96413 CHEMO IV INFUSION 1 HR: CPT

## 2023-12-13 PROCEDURE — 85025 COMPLETE CBC W/AUTO DIFF WBC: CPT | Performed by: INTERNAL MEDICINE

## 2023-12-13 PROCEDURE — 96415 CHEMO IV INFUSION ADDL HR: CPT

## 2023-12-13 PROCEDURE — 96375 TX/PRO/DX INJ NEW DRUG ADDON: CPT | Mod: INF

## 2023-12-13 RX ORDER — DIPHENHYDRAMINE HYDROCHLORIDE 50 MG/ML
50 INJECTION INTRAMUSCULAR; INTRAVENOUS AS NEEDED
Status: DISCONTINUED | OUTPATIENT
Start: 2023-12-13 | End: 2023-12-13 | Stop reason: HOSPADM

## 2023-12-13 RX ORDER — FAMOTIDINE 10 MG/ML
20 INJECTION INTRAVENOUS ONCE AS NEEDED
Status: CANCELLED | OUTPATIENT
Start: 2023-12-13

## 2023-12-13 RX ORDER — PALONOSETRON 0.05 MG/ML
0.25 INJECTION, SOLUTION INTRAVENOUS ONCE
Status: CANCELLED | OUTPATIENT
Start: 2023-12-13

## 2023-12-13 RX ORDER — PROCHLORPERAZINE EDISYLATE 5 MG/ML
10 INJECTION INTRAMUSCULAR; INTRAVENOUS EVERY 6 HOURS PRN
Status: CANCELLED | OUTPATIENT
Start: 2023-12-13

## 2023-12-13 RX ORDER — HEPARIN 100 UNIT/ML
500 SYRINGE INTRAVENOUS AS NEEDED
Status: DISCONTINUED | OUTPATIENT
Start: 2023-12-13 | End: 2023-12-13 | Stop reason: HOSPADM

## 2023-12-13 RX ORDER — HEPARIN SODIUM,PORCINE/PF 10 UNIT/ML
50 SYRINGE (ML) INTRAVENOUS AS NEEDED
Status: CANCELLED | OUTPATIENT
Start: 2023-12-13

## 2023-12-13 RX ORDER — EPINEPHRINE 0.3 MG/.3ML
0.3 INJECTION SUBCUTANEOUS EVERY 5 MIN PRN
Status: CANCELLED | OUTPATIENT
Start: 2023-12-13

## 2023-12-13 RX ORDER — LORAZEPAM 2 MG/ML
1 INJECTION INTRAMUSCULAR AS NEEDED
Status: DISCONTINUED | OUTPATIENT
Start: 2023-12-13 | End: 2023-12-13 | Stop reason: HOSPADM

## 2023-12-13 RX ORDER — HEPARIN 100 UNIT/ML
500 SYRINGE INTRAVENOUS AS NEEDED
Status: CANCELLED | OUTPATIENT
Start: 2023-12-13

## 2023-12-13 RX ORDER — LORAZEPAM 2 MG/ML
1 INJECTION INTRAMUSCULAR AS NEEDED
Status: CANCELLED | OUTPATIENT
Start: 2023-12-13

## 2023-12-13 RX ORDER — PALONOSETRON 0.05 MG/ML
0.25 INJECTION, SOLUTION INTRAVENOUS ONCE
Status: COMPLETED | OUTPATIENT
Start: 2023-12-13 | End: 2023-12-13

## 2023-12-13 RX ORDER — PROCHLORPERAZINE MALEATE 10 MG
10 TABLET ORAL EVERY 6 HOURS PRN
Status: CANCELLED | OUTPATIENT
Start: 2023-12-13

## 2023-12-13 RX ORDER — EPINEPHRINE 0.3 MG/.3ML
0.3 INJECTION SUBCUTANEOUS EVERY 5 MIN PRN
Status: DISCONTINUED | OUTPATIENT
Start: 2023-12-13 | End: 2023-12-13 | Stop reason: HOSPADM

## 2023-12-13 RX ORDER — PROCHLORPERAZINE MALEATE 10 MG
10 TABLET ORAL EVERY 6 HOURS PRN
Status: DISCONTINUED | OUTPATIENT
Start: 2023-12-13 | End: 2023-12-13 | Stop reason: HOSPADM

## 2023-12-13 RX ORDER — HEPARIN SODIUM,PORCINE/PF 10 UNIT/ML
50 SYRINGE (ML) INTRAVENOUS AS NEEDED
Status: DISCONTINUED | OUTPATIENT
Start: 2023-12-13 | End: 2023-12-13 | Stop reason: HOSPADM

## 2023-12-13 RX ORDER — DIPHENHYDRAMINE HYDROCHLORIDE 50 MG/ML
50 INJECTION INTRAMUSCULAR; INTRAVENOUS AS NEEDED
Status: CANCELLED | OUTPATIENT
Start: 2023-12-13

## 2023-12-13 RX ORDER — PROCHLORPERAZINE EDISYLATE 5 MG/ML
10 INJECTION INTRAMUSCULAR; INTRAVENOUS EVERY 6 HOURS PRN
Status: DISCONTINUED | OUTPATIENT
Start: 2023-12-13 | End: 2023-12-13 | Stop reason: HOSPADM

## 2023-12-13 RX ORDER — ALBUTEROL SULFATE 0.83 MG/ML
3 SOLUTION RESPIRATORY (INHALATION) AS NEEDED
Status: DISCONTINUED | OUTPATIENT
Start: 2023-12-13 | End: 2023-12-13 | Stop reason: HOSPADM

## 2023-12-13 RX ORDER — FAMOTIDINE 10 MG/ML
20 INJECTION INTRAVENOUS ONCE AS NEEDED
Status: DISCONTINUED | OUTPATIENT
Start: 2023-12-13 | End: 2023-12-13 | Stop reason: HOSPADM

## 2023-12-13 RX ORDER — ALBUTEROL SULFATE 0.83 MG/ML
3 SOLUTION RESPIRATORY (INHALATION) AS NEEDED
Status: CANCELLED | OUTPATIENT
Start: 2023-12-13

## 2023-12-13 RX ADMIN — PALONOSETRON HYDROCHLORIDE 250 MCG: 0.25 INJECTION INTRAVENOUS at 13:29

## 2023-12-13 RX ADMIN — FLUOROURACIL 5300 MG: 50 INJECTION, SOLUTION INTRAVENOUS at 16:00

## 2023-12-13 RX ADMIN — DEXAMETHASONE SODIUM PHOSPHATE 12 MG: 10 INJECTION, SOLUTION INTRAMUSCULAR; INTRAVENOUS at 13:29

## 2023-12-13 RX ADMIN — OXALIPLATIN 190 MG: 100 INJECTION, SOLUTION, CONCENTRATE INTRAVENOUS at 13:51

## 2023-12-13 ASSESSMENT — PAIN SCALES - GENERAL: PAINLEVEL: 0-NO PAIN

## 2023-12-13 NOTE — PATIENT INSTRUCTIONS
Please be sure to cover up with gloves, scarf/hat when outside as your chemotherapy (Oxaliplatin) can cause cold sensitivity. Notify the office of fever >100.4, worsening numbness/tingling, or any other questions or concerns.

## 2023-12-15 ENCOUNTER — APPOINTMENT (OUTPATIENT)
Dept: HEMATOLOGY/ONCOLOGY | Facility: CLINIC | Age: 73
End: 2023-12-15
Payer: MEDICARE

## 2023-12-15 ENCOUNTER — INFUSION (OUTPATIENT)
Dept: HEMATOLOGY/ONCOLOGY | Facility: CLINIC | Age: 73
End: 2023-12-15
Payer: MEDICARE

## 2023-12-15 VITALS
BODY MASS INDEX: 28.57 KG/M2 | RESPIRATION RATE: 22 BRPM | SYSTOLIC BLOOD PRESSURE: 154 MMHG | TEMPERATURE: 97.9 F | HEART RATE: 49 BPM | DIASTOLIC BLOOD PRESSURE: 87 MMHG | OXYGEN SATURATION: 98 % | WEIGHT: 216.49 LBS

## 2023-12-15 DIAGNOSIS — C20 MALIGNANT NEOPLASM OF RECTUM (MULTI): ICD-10-CM

## 2023-12-15 DIAGNOSIS — D69.3 IDIOPATHIC THROMBOCYTOPENIC PURPURA (ITP) (MULTI): ICD-10-CM

## 2023-12-15 PROCEDURE — 2500000004 HC RX 250 GENERAL PHARMACY W/ HCPCS (ALT 636 FOR OP/ED): Performed by: INTERNAL MEDICINE

## 2023-12-15 PROCEDURE — 96523 IRRIG DRUG DELIVERY DEVICE: CPT

## 2023-12-15 RX ORDER — HEPARIN 100 UNIT/ML
500 SYRINGE INTRAVENOUS AS NEEDED
Status: CANCELLED | OUTPATIENT
Start: 2023-12-15

## 2023-12-15 RX ORDER — HEPARIN SODIUM,PORCINE/PF 10 UNIT/ML
50 SYRINGE (ML) INTRAVENOUS AS NEEDED
Status: DISCONTINUED | OUTPATIENT
Start: 2023-12-15 | End: 2023-12-15 | Stop reason: HOSPADM

## 2023-12-15 RX ORDER — HEPARIN 100 UNIT/ML
500 SYRINGE INTRAVENOUS AS NEEDED
Status: DISCONTINUED | OUTPATIENT
Start: 2023-12-15 | End: 2023-12-15 | Stop reason: HOSPADM

## 2023-12-15 RX ORDER — HEPARIN SODIUM,PORCINE/PF 10 UNIT/ML
50 SYRINGE (ML) INTRAVENOUS AS NEEDED
Status: CANCELLED | OUTPATIENT
Start: 2023-12-15

## 2023-12-15 RX ADMIN — HEPARIN 500 UNITS: 100 SYRINGE at 14:20

## 2023-12-15 ASSESSMENT — PAIN SCALES - GENERAL: PAINLEVEL: 0-NO PAIN

## 2023-12-19 ENCOUNTER — OFFICE VISIT (OUTPATIENT)
Dept: HEMATOLOGY/ONCOLOGY | Facility: CLINIC | Age: 73
End: 2023-12-19
Payer: MEDICARE

## 2023-12-19 VITALS
DIASTOLIC BLOOD PRESSURE: 83 MMHG | WEIGHT: 210.76 LBS | SYSTOLIC BLOOD PRESSURE: 167 MMHG | HEART RATE: 53 BPM | BODY MASS INDEX: 27.81 KG/M2 | OXYGEN SATURATION: 99 % | RESPIRATION RATE: 18 BRPM | TEMPERATURE: 97.2 F

## 2023-12-19 DIAGNOSIS — C20 MALIGNANT NEOPLASM OF RECTUM (MULTI): Primary | ICD-10-CM

## 2023-12-19 PROCEDURE — 1036F TOBACCO NON-USER: CPT | Performed by: INTERNAL MEDICINE

## 2023-12-19 PROCEDURE — 3077F SYST BP >= 140 MM HG: CPT | Performed by: INTERNAL MEDICINE

## 2023-12-19 PROCEDURE — 1160F RVW MEDS BY RX/DR IN RCRD: CPT | Performed by: INTERNAL MEDICINE

## 2023-12-19 PROCEDURE — 3079F DIAST BP 80-89 MM HG: CPT | Performed by: INTERNAL MEDICINE

## 2023-12-19 PROCEDURE — 1159F MED LIST DOCD IN RCRD: CPT | Performed by: INTERNAL MEDICINE

## 2023-12-19 PROCEDURE — 99214 OFFICE O/P EST MOD 30 MIN: CPT | Performed by: INTERNAL MEDICINE

## 2023-12-19 PROCEDURE — 1126F AMNT PAIN NOTED NONE PRSNT: CPT | Performed by: INTERNAL MEDICINE

## 2023-12-19 RX ORDER — ATROPINE SULFATE 0.4 MG/ML
0.4 INJECTION, SOLUTION ENDOTRACHEAL; INTRAMEDULLARY; INTRAMUSCULAR; INTRAVENOUS; SUBCUTANEOUS
Status: CANCELLED | OUTPATIENT
Start: 2024-01-03

## 2023-12-19 RX ORDER — ALBUTEROL SULFATE 0.83 MG/ML
3 SOLUTION RESPIRATORY (INHALATION) AS NEEDED
Status: CANCELLED | OUTPATIENT
Start: 2024-01-03

## 2023-12-19 RX ORDER — EPINEPHRINE 0.3 MG/.3ML
0.3 INJECTION SUBCUTANEOUS EVERY 5 MIN PRN
Status: CANCELLED | OUTPATIENT
Start: 2024-01-03

## 2023-12-19 RX ORDER — PROCHLORPERAZINE EDISYLATE 5 MG/ML
10 INJECTION INTRAMUSCULAR; INTRAVENOUS EVERY 6 HOURS PRN
Status: CANCELLED | OUTPATIENT
Start: 2024-01-03

## 2023-12-19 RX ORDER — FAMOTIDINE 10 MG/ML
20 INJECTION INTRAVENOUS ONCE AS NEEDED
Status: CANCELLED | OUTPATIENT
Start: 2024-01-03

## 2023-12-19 RX ORDER — PALONOSETRON 0.05 MG/ML
0.25 INJECTION, SOLUTION INTRAVENOUS ONCE
Status: CANCELLED | OUTPATIENT
Start: 2024-01-03

## 2023-12-19 RX ORDER — DIPHENHYDRAMINE HYDROCHLORIDE 50 MG/ML
50 INJECTION INTRAMUSCULAR; INTRAVENOUS AS NEEDED
Status: CANCELLED | OUTPATIENT
Start: 2024-01-03

## 2023-12-19 RX ORDER — FLUOROURACIL 50 MG/ML
400 INJECTION, SOLUTION INTRAVENOUS ONCE
Status: CANCELLED | OUTPATIENT
Start: 2024-01-03

## 2023-12-19 RX ORDER — PROCHLORPERAZINE MALEATE 10 MG
10 TABLET ORAL EVERY 6 HOURS PRN
Status: CANCELLED | OUTPATIENT
Start: 2024-01-03

## 2023-12-19 ASSESSMENT — ENCOUNTER SYMPTOMS
LOSS OF SENSATION IN FEET: 0
OCCASIONAL FEELINGS OF UNSTEADINESS: 0
DEPRESSION: 0

## 2023-12-19 ASSESSMENT — PAIN SCALES - GENERAL: PAINLEVEL: 0-NO PAIN

## 2023-12-19 NOTE — PROGRESS NOTES
Patient here alone for follow up with Dr Pickard and to review recent scan an labs. Patient reports mild nausea s/p chemo infusions but states he does not need to use the prescribed antiemetics. MD made aware.

## 2023-12-19 NOTE — PROGRESS NOTES
Patient ID: Bobby Holland is a 73 y.o. male from [place], [place].     Referring Physician: Bobby Pickard MD  [id number] [place] [address]    Primary Care Provider: Bobby Pickard MD    Cancer History:   Treatment Synopsis:    DIAGNOSIS  Rectal Cancer 5/2023     STAGING  [id number]     CURRENT SITES OF DISEASE  Rectum, liver and lung     MOLECULAR GENOMICS    Test Name: [employer] xT (XT.V4)  Laboratory Name: [facility]  Specimen Source: Colon, rectosigmoid  Collection Date: 15-May-2023  Cancer Type: Invasive adenocarcinoma  Report Id: [id number]    Molecular Findings:  * Gene: KRAS, Variant: Missense variant (exon 2) - GOF, Potentially Actionable, p.G12V (Allele Frequency - 16.6%)  * Gene: APC, Variant: Frameshift - LOF, Biologically Relevant, p.R7952tr (Allele Frequency - 26.6%)  * Gene: APC, Variant: Stop gain - LOF, Biologically Relevant, p.R213*, Nonsense (Allele Frequency - 20.2%)  * Gene: FBXW7, Variant: Missense variant - LOF, Biologically Relevant, p.R505C (Allele Frequency - 23.3%)  * Gene: CCND1, Variant: Amplification, Copy number gain, Biologically Relevant  * Gene: FGF3, Variant: Amplification, Copy number gain, Biologically Relevant  * Gene: FGF4, Variant: Amplification, Copy number gain, Biologically Relevant  * Biomarker: Microsatellite Instability, Status: stable  * Biomarker: Tumor Mutation [place] (5.8 Muts/Mb, Percentile: 63.0)  * Disease associated genes/variants with no reportable findings:     * BRAF    * NRAS  * 7 variants of unknown significance    ** End of copied information **     SERUM TUMOR MARKER  CEA 4.4 - June 2023     CURRENT THERAPY  July 2023 - FOLFOX      PRIOR THERAPY  None     DEVICES  mediport     CURRENT PROBLEM LIST   ITP  - spleenectomy 2012   Graves ds  Metastatic colorectal cancer        Treatment History:    73 y.o.  male presenting to the emergency room with hematochezia in early April hemoglobin low taken off Eliquis.  Bleeding initially resolved.   Anoscopy in May  2023 showed a large circumferential nonobstructing mass 7 cm in size at 9 cm from the anal verge.  Biopsy showed moderately differentiated adenocarcinoma.  MMR was proficient.  CEA was 4.7.  Imaging with Dr. Mccrary which showed lung and liver metastases.   PET scan confirmed FDG avidity of these lesions.          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  Social History:  Bobby reports that he has never smoked. He has never been exposed to tobacco smoke. He has never used smokeless tobacco. He reports current alcohol use of about 2.0 standard drinks of alcohol per week. He reports that he does not use drugs.  Family History:  No family history on file.  Family Oncology History:  Cancer-related family history is not on file.        SUBJECTIVE:    History of Present Illness:  Bobby Holland is a 73 y.o. male who was referred by Bobby Pickard MD and presents with Follow-up (fuv)    HPI  OBJECTIVE:    VS / Pain:  /83   Pulse 53   Temp 36.2 °C (97.2 °F)   Resp 18   Wt 95.6 kg (210 lb 12.2 oz)   SpO2 99%   BMI 27.81 kg/m²   BSA: 2.22 meters squared   Pain Scale: 0    Daily Weight  12/19/23 : 95.6 kg (210 lb 12.2 oz)  12/15/23 : 98.2 kg (216 lb 7.9 oz)  12/13/23 : 96.1 kg (211 lb 12 oz)      Physical Exam  Constitutional:       Appearance: He is normal weight.   HENT:      Nose: Nose normal.      Mouth/Throat:      Mouth: Mucous membranes are moist.   Eyes:      Pupils: Pupils are equal, round, and reactive to light.   Cardiovascular:      Rate and Rhythm: Normal rate.      Pulses: Normal pulses.   Abdominal:      General: Abdomen is flat.   Musculoskeletal:         General: Normal range of motion.   Skin:     General: Skin is warm.   Neurological:      General: No focal deficit present.      Mental Status: He is alert.    Psychiatric:         Mood and Affect: Mood normal.         Judgment: Judgment normal.       Performance Status:   ECOG 1    Diagnostic Results         WBC   Date/Time Value Ref Range Status   12/13/2023 12:41 PM 7.7 4.4 - 11.3 x10*3/uL Final   12/08/2023 12:31 PM 10.9 4.4 - 11.3 x10*3/uL Final   11/29/2023 11:14 AM 8.9 4.4 - 11.3 x10*3/uL Final     Hemoglobin   Date Value Ref Range Status   12/13/2023 11.1 (L) 13.5 - 17.5 g/dL Final   12/08/2023 11.0 (L) 13.5 - 17.5 g/dL Final   11/29/2023 10.9 (L) 13.5 - 17.5 g/dL Final     MCV   Date/Time Value Ref Range Status   12/13/2023 12:41 PM 87 80 - 100 fL Final   12/08/2023 12:31 PM 89 80 - 100 fL Final   11/29/2023 11:14 AM 89 80 - 100 fL Final     Platelets   Date/Time Value Ref Range Status   12/13/2023 12:41  (H) 150 - 450 x10*3/uL Final   12/08/2023 12:31  (H) 150 - 450 x10*3/uL Final   11/29/2023 11:14  150 - 450 x10*3/uL Final     Neutrophils Absolute   Date/Time Value Ref Range Status   12/13/2023 12:41 PM 3.38 1.60 - 5.50 x10*3/uL Final     Comment:     Percent differential counts (%) should be interpreted in the context of the absolute cell counts (cells/uL).   12/08/2023 12:31 PM 6.64 (H) 1.60 - 5.50 x10*3/uL Final     Comment:     Percent differential counts (%) should be interpreted in the context of the absolute cell counts (cells/uL).   11/29/2023 11:14 AM 4.95 1.60 - 5.50 x10*3/uL Final     Comment:     Percent differential counts (%) should be interpreted in the context of the absolute cell counts (cells/uL).     Bilirubin, Total   Date/Time Value Ref Range Status   12/13/2023 12:41 PM 0.5 0.0 - 1.2 mg/dL Final   12/08/2023 12:31 PM 0.4 0.0 - 1.2 mg/dL Final   11/29/2023 11:14 AM 0.4 0.0 - 1.2 mg/dL Final     AST   Date/Time Value Ref Range Status   12/13/2023 12:41 PM 24 9 - 39 U/L Final   12/08/2023 12:31 PM 24 9 - 39 U/L Final   11/29/2023 11:14 AM 18 9 - 39 U/L Final     ALT   Date/Time Value Ref Range Status   12/13/2023 12:41 PM  "19 10 - 52 U/L Final     Comment:     Patients treated with Sulfasalazine may generate falsely decreased results for ALT.   12/08/2023 12:31 PM 19 10 - 52 U/L Final     Comment:     Patients treated with Sulfasalazine may generate falsely decreased results for ALT.   11/29/2023 11:14 AM 14 10 - 52 U/L Final     Comment:     Patients treated with Sulfasalazine may generate falsely decreased results for ALT.     Creatinine   Date/Time Value Ref Range Status   12/13/2023 12:41 PM 0.85 0.50 - 1.30 mg/dL Final   12/08/2023 12:31 PM 0.91 0.50 - 1.30 mg/dL Final   11/29/2023 11:14 AM 0.87 0.50 - 1.30 mg/dL Final     Urea Nitrogen   Date/Time Value Ref Range Status   12/13/2023 12:41 PM 14 6 - 23 mg/dL Final   12/08/2023 12:31 PM 17 6 - 23 mg/dL Final   11/29/2023 11:14 AM 17 6 - 23 mg/dL Final     Albumin   Date/Time Value Ref Range Status   12/13/2023 12:41 PM 3.7 3.4 - 5.0 g/dL Final   12/08/2023 12:31 PM 3.7 3.4 - 5.0 g/dL Final   11/29/2023 11:14 AM 3.5 3.4 - 5.0 g/dL Final     No results found for: \"\"  Carcinoembryonic AG   Date/Time Value Ref Range Status   12/13/2023 12:41 PM 11.3 ug/L Final   11/29/2023 11:14 AM 13.7 ug/L Final   11/14/2023 05:06 PM 8.5 ug/L Final         === 09/07/23 ===    CT CHEST ABDOMEN PELVIS W IV CONTRAST    - Impression -  Restaging scan for rectal adenocarcinoma:    1. When compared to the prior exam on 05/31/2023, there has been  worsening of tumor burden as evidenced by a few enlarging hepatic and  pulmonary metastases as well as one new hepatic metastasis. There is  persistent circumferential wall thickening of the rectum  corresponding to biopsy-proven adenocarcinoma, no remarkably changed  when compared to the prior study given differences in distension.  2. Asymmetric nodular soft tissue thickening of the posterior wall of  the gastric fundus similar to multiple prior exams which may in part  be due to underdistention, however may represent focal gastritis.  3. Partially imaged " large right inguinal hernia containing loops of  nondilated small bowel, similar to prior study.      I personally reviewed the images/study and I agree with the findings  as stated by resident physician Dr. Mirta Atkins.    MACRO:  None      Assessment/Plan     Assessment:    73 y.o. man with metastatic rectal cancer with liver and lung lesions diagnosed originally in 6/2023.      Plan:  Stage IV rectal Ca:  cycle #4 - 8/30   f/u with Dr. Pickard after cycle #4 with scans   - Obtain NGS/MSI status  - Plan to add Yohana given KRAS mutation if plts remain stable.        H/o ITP -     - s/p splencectomy   -   plt cutoff is 75,000 for chemotherapy   - needs romiplostim every other week during chemo.       Bobby Pickard MD     [facility]/ [facility] [place]  Office: [phone]  Fax: [phone]

## 2023-12-20 ENCOUNTER — TELEPHONE (OUTPATIENT)
Dept: SCHEDULING | Age: 73
End: 2023-12-20
Payer: MEDICARE

## 2023-12-20 DIAGNOSIS — I48.92 ATRIAL FLUTTER, UNSPECIFIED TYPE (MULTI): Primary | ICD-10-CM

## 2023-12-20 RX ORDER — PROPRANOLOL HYDROCHLORIDE 60 MG/1
60 TABLET ORAL 3 TIMES DAILY
Qty: 270 TABLET | Refills: 3 | Status: SHIPPED | OUTPATIENT
Start: 2023-12-20

## 2023-12-20 NOTE — TELEPHONE ENCOUNTER
Any update on this?     Pt is calling to see if Dr. Pickard would like him to get N-plate injection on 12/22 as scheduled. Please advise and I can call pt with answer. Thank you for your guidance!

## 2023-12-21 ENCOUNTER — APPOINTMENT (OUTPATIENT)
Dept: HEMATOLOGY/ONCOLOGY | Facility: HOSPITAL | Age: 73
End: 2023-12-21
Payer: MEDICARE

## 2023-12-22 ENCOUNTER — APPOINTMENT (OUTPATIENT)
Dept: HEMATOLOGY/ONCOLOGY | Facility: CLINIC | Age: 73
End: 2023-12-22
Payer: MEDICARE

## 2023-12-26 DIAGNOSIS — C20 MALIGNANT NEOPLASM OF RECTUM (MULTI): Primary | ICD-10-CM

## 2023-12-27 ENCOUNTER — INFUSION (OUTPATIENT)
Dept: HEMATOLOGY/ONCOLOGY | Facility: CLINIC | Age: 73
End: 2023-12-27
Payer: MEDICARE

## 2023-12-27 ENCOUNTER — TELEPHONE (OUTPATIENT)
Dept: HEMATOLOGY/ONCOLOGY | Facility: CLINIC | Age: 73
End: 2023-12-27

## 2023-12-27 VITALS
BODY MASS INDEX: 27.29 KG/M2 | HEART RATE: 54 BPM | SYSTOLIC BLOOD PRESSURE: 121 MMHG | WEIGHT: 206.79 LBS | RESPIRATION RATE: 18 BRPM | OXYGEN SATURATION: 95 % | TEMPERATURE: 97.5 F | DIASTOLIC BLOOD PRESSURE: 75 MMHG

## 2023-12-27 DIAGNOSIS — C20 MALIGNANT NEOPLASM OF RECTUM (MULTI): ICD-10-CM

## 2023-12-27 DIAGNOSIS — D69.3 IDIOPATHIC THROMBOCYTOPENIC PURPURA (ITP) (MULTI): ICD-10-CM

## 2023-12-27 DIAGNOSIS — C20 MALIGNANT NEOPLASM OF RECTUM (MULTI): Primary | ICD-10-CM

## 2023-12-27 LAB
ALBUMIN SERPL BCP-MCNC: 3.6 G/DL (ref 3.4–5)
ALP SERPL-CCNC: 95 U/L (ref 33–136)
ALT SERPL W P-5'-P-CCNC: 19 U/L (ref 10–52)
ANION GAP SERPL CALC-SCNC: 12 MMOL/L (ref 10–20)
APPEARANCE UR: CLEAR
AST SERPL W P-5'-P-CCNC: 25 U/L (ref 9–39)
BASOPHILS # BLD AUTO: 0.07 X10*3/UL (ref 0–0.1)
BASOPHILS NFR BLD AUTO: 1.3 %
BILIRUB SERPL-MCNC: 0.7 MG/DL (ref 0–1.2)
BILIRUB UR STRIP.AUTO-MCNC: NEGATIVE MG/DL
BUN SERPL-MCNC: 16 MG/DL (ref 6–23)
CALCIUM SERPL-MCNC: 9.4 MG/DL (ref 8.6–10.3)
CEA SERPL-MCNC: 13.4 UG/L
CHLORIDE SERPL-SCNC: 104 MMOL/L (ref 98–107)
CO2 SERPL-SCNC: 27 MMOL/L (ref 21–32)
COLOR UR: YELLOW
CREAT SERPL-MCNC: 0.92 MG/DL (ref 0.5–1.3)
EOSINOPHIL # BLD AUTO: 0.12 X10*3/UL (ref 0–0.4)
EOSINOPHIL NFR BLD AUTO: 2.2 %
ERYTHROCYTE [DISTWIDTH] IN BLOOD BY AUTOMATED COUNT: 15.3 % (ref 11.5–14.5)
GFR SERPL CREATININE-BSD FRML MDRD: 87 ML/MIN/1.73M*2
GLUCOSE SERPL-MCNC: 106 MG/DL (ref 74–99)
GLUCOSE UR STRIP.AUTO-MCNC: NEGATIVE MG/DL
HBV CORE AB SER QL: NONREACTIVE
HBV SURFACE AB SER-ACNC: 367.6 MIU/ML
HBV SURFACE AG SERPL QL IA: NONREACTIVE
HCT VFR BLD AUTO: 35.2 % (ref 41–52)
HGB BLD-MCNC: 11.1 G/DL (ref 13.5–17.5)
HYALINE CASTS #/AREA URNS AUTO: ABNORMAL /LPF
IMM GRANULOCYTES # BLD AUTO: 0.01 X10*3/UL (ref 0–0.5)
IMM GRANULOCYTES NFR BLD AUTO: 0.2 % (ref 0–0.9)
KETONES UR STRIP.AUTO-MCNC: NEGATIVE MG/DL
LEUKOCYTE ESTERASE UR QL STRIP.AUTO: NEGATIVE
LYMPHOCYTES # BLD AUTO: 2.02 X10*3/UL (ref 0.8–3)
LYMPHOCYTES NFR BLD AUTO: 36.3 %
MCH RBC QN AUTO: 27.3 PG (ref 26–34)
MCHC RBC AUTO-ENTMCNC: 31.5 G/DL (ref 32–36)
MCV RBC AUTO: 87 FL (ref 80–100)
MONOCYTES # BLD AUTO: 1.06 X10*3/UL (ref 0.05–0.8)
MONOCYTES NFR BLD AUTO: 19.1 %
MUCOUS THREADS #/AREA URNS AUTO: ABNORMAL /LPF
NEUTROPHILS # BLD AUTO: 2.28 X10*3/UL (ref 1.6–5.5)
NEUTROPHILS NFR BLD AUTO: 40.9 %
NITRITE UR QL STRIP.AUTO: NEGATIVE
NRBC BLD-RTO: 0.5 /100 WBCS (ref 0–0)
PH UR STRIP.AUTO: 5 [PH]
PLATELET # BLD AUTO: 17 X10*3/UL (ref 150–450)
POTASSIUM SERPL-SCNC: 3.9 MMOL/L (ref 3.5–5.3)
PROT SERPL-MCNC: 7.3 G/DL (ref 6.4–8.2)
PROT UR STRIP.AUTO-MCNC: NEGATIVE MG/DL
RBC # BLD AUTO: 4.07 X10*6/UL (ref 4.5–5.9)
RBC # UR STRIP.AUTO: ABNORMAL /UL
RBC #/AREA URNS AUTO: ABNORMAL /HPF
SODIUM SERPL-SCNC: 139 MMOL/L (ref 136–145)
SP GR UR STRIP.AUTO: 1.02
UROBILINOGEN UR STRIP.AUTO-MCNC: <2 MG/DL
WBC # BLD AUTO: 5.6 X10*3/UL (ref 4.4–11.3)
WBC #/AREA URNS AUTO: ABNORMAL /HPF

## 2023-12-27 PROCEDURE — 87340 HEPATITIS B SURFACE AG IA: CPT | Performed by: INTERNAL MEDICINE

## 2023-12-27 PROCEDURE — 96372 THER/PROPH/DIAG INJ SC/IM: CPT | Performed by: INTERNAL MEDICINE

## 2023-12-27 PROCEDURE — 2500000004 HC RX 250 GENERAL PHARMACY W/ HCPCS (ALT 636 FOR OP/ED): Performed by: INTERNAL MEDICINE

## 2023-12-27 PROCEDURE — 86706 HEP B SURFACE ANTIBODY: CPT | Performed by: INTERNAL MEDICINE

## 2023-12-27 PROCEDURE — 96372 THER/PROPH/DIAG INJ SC/IM: CPT

## 2023-12-27 PROCEDURE — 80053 COMPREHEN METABOLIC PANEL: CPT | Performed by: INTERNAL MEDICINE

## 2023-12-27 PROCEDURE — 81001 URINALYSIS AUTO W/SCOPE: CPT | Performed by: INTERNAL MEDICINE

## 2023-12-27 PROCEDURE — 85025 COMPLETE CBC W/AUTO DIFF WBC: CPT | Performed by: INTERNAL MEDICINE

## 2023-12-27 PROCEDURE — 82378 CARCINOEMBRYONIC ANTIGEN: CPT | Performed by: INTERNAL MEDICINE

## 2023-12-27 PROCEDURE — 86704 HEP B CORE ANTIBODY TOTAL: CPT | Performed by: INTERNAL MEDICINE

## 2023-12-27 RX ORDER — EPINEPHRINE 0.3 MG/.3ML
0.3 INJECTION SUBCUTANEOUS EVERY 5 MIN PRN
Status: CANCELLED | OUTPATIENT
Start: 2023-12-29

## 2023-12-27 RX ORDER — HEPARIN SODIUM,PORCINE/PF 10 UNIT/ML
50 SYRINGE (ML) INTRAVENOUS AS NEEDED
Status: DISCONTINUED | OUTPATIENT
Start: 2023-12-27 | End: 2023-12-27 | Stop reason: HOSPADM

## 2023-12-27 RX ORDER — DIPHENHYDRAMINE HYDROCHLORIDE 50 MG/ML
50 INJECTION INTRAMUSCULAR; INTRAVENOUS AS NEEDED
Status: CANCELLED | OUTPATIENT
Start: 2023-12-29

## 2023-12-27 RX ORDER — ALBUTEROL SULFATE 0.83 MG/ML
3 SOLUTION RESPIRATORY (INHALATION) AS NEEDED
Status: CANCELLED | OUTPATIENT
Start: 2023-12-29

## 2023-12-27 RX ORDER — FAMOTIDINE 10 MG/ML
20 INJECTION INTRAVENOUS ONCE AS NEEDED
Status: CANCELLED | OUTPATIENT
Start: 2023-12-29

## 2023-12-27 RX ORDER — HEPARIN 100 UNIT/ML
500 SYRINGE INTRAVENOUS AS NEEDED
Status: CANCELLED | OUTPATIENT
Start: 2023-12-27

## 2023-12-27 RX ORDER — HEPARIN SODIUM,PORCINE/PF 10 UNIT/ML
50 SYRINGE (ML) INTRAVENOUS AS NEEDED
Status: CANCELLED | OUTPATIENT
Start: 2023-12-27

## 2023-12-27 RX ORDER — HEPARIN 100 UNIT/ML
500 SYRINGE INTRAVENOUS AS NEEDED
Status: DISCONTINUED | OUTPATIENT
Start: 2023-12-27 | End: 2023-12-27 | Stop reason: HOSPADM

## 2023-12-27 RX ADMIN — HEPARIN 500 UNITS: 100 SYRINGE at 09:14

## 2023-12-27 RX ADMIN — ROMIPLOSTIM 190 MCG: 250 INJECTION, POWDER, LYOPHILIZED, FOR SOLUTION SUBCUTANEOUS at 09:12

## 2023-12-27 ASSESSMENT — PAIN SCALES - GENERAL: PAINLEVEL: 0-NO PAIN

## 2023-12-27 NOTE — PROGRESS NOTES
Patient's platelet count at 17 today - Dr. Pickard and Oma Ott CNP made aware. Treatment being held today - deferred to next Wednesday 1/3. NPLATE injection given today. Patient to come in next Tuesday to get labs drawn for treatment the next day.

## 2023-12-29 ENCOUNTER — APPOINTMENT (OUTPATIENT)
Dept: HEMATOLOGY/ONCOLOGY | Facility: CLINIC | Age: 73
End: 2023-12-29
Payer: MEDICARE

## 2024-01-02 ENCOUNTER — LAB (OUTPATIENT)
Dept: LAB | Facility: CLINIC | Age: 74
End: 2024-01-02
Payer: MEDICARE

## 2024-01-02 DIAGNOSIS — C20 MALIGNANT NEOPLASM OF RECTUM (MULTI): ICD-10-CM

## 2024-01-02 LAB
ACANTHOCYTES BLD QL SMEAR: NORMAL
ALBUMIN SERPL BCP-MCNC: 3.8 G/DL (ref 3.4–5)
ALP SERPL-CCNC: 102 U/L (ref 33–136)
ALT SERPL W P-5'-P-CCNC: 19 U/L (ref 10–52)
ANION GAP SERPL CALC-SCNC: 14 MMOL/L (ref 10–20)
APPEARANCE UR: CLEAR
AST SERPL W P-5'-P-CCNC: 24 U/L (ref 9–39)
BASOPHILS # BLD AUTO: 0.07 X10*3/UL (ref 0–0.1)
BASOPHILS NFR BLD AUTO: 1.3 %
BILIRUB SERPL-MCNC: 0.6 MG/DL (ref 0–1.2)
BILIRUB UR STRIP.AUTO-MCNC: NEGATIVE MG/DL
BUN SERPL-MCNC: 15 MG/DL (ref 6–23)
BURR CELLS BLD QL SMEAR: NORMAL
CALCIUM SERPL-MCNC: 10 MG/DL (ref 8.6–10.3)
CHLORIDE SERPL-SCNC: 103 MMOL/L (ref 98–107)
CO2 SERPL-SCNC: 28 MMOL/L (ref 21–32)
COLOR UR: ABNORMAL
CREAT SERPL-MCNC: 1.01 MG/DL (ref 0.5–1.3)
EOSINOPHIL # BLD AUTO: 0.11 X10*3/UL (ref 0–0.4)
EOSINOPHIL NFR BLD AUTO: 2.1 %
ERYTHROCYTE [DISTWIDTH] IN BLOOD BY AUTOMATED COUNT: 15.2 % (ref 11.5–14.5)
GFR SERPL CREATININE-BSD FRML MDRD: 78 ML/MIN/1.73M*2
GIANT PLATELETS BLD QL SMEAR: NORMAL
GLUCOSE SERPL-MCNC: 114 MG/DL (ref 74–99)
GLUCOSE UR STRIP.AUTO-MCNC: NEGATIVE MG/DL
HBV CORE AB SER QL: NONREACTIVE
HBV SURFACE AB SER-ACNC: 471.5 MIU/ML
HBV SURFACE AG SERPL QL IA: NONREACTIVE
HCT VFR BLD AUTO: 38 % (ref 41–52)
HGB BLD-MCNC: 12 G/DL (ref 13.5–17.5)
HOWELL-JOLLY BOD BLD QL SMEAR: PRESENT
HYPOCHROMIA BLD QL SMEAR: NORMAL
IMM GRANULOCYTES # BLD AUTO: 0.02 X10*3/UL (ref 0–0.5)
IMM GRANULOCYTES NFR BLD AUTO: 0.4 % (ref 0–0.9)
KETONES UR STRIP.AUTO-MCNC: ABNORMAL MG/DL
LEUKOCYTE ESTERASE UR QL STRIP.AUTO: NEGATIVE
LYMPHOCYTES # BLD AUTO: 1.95 X10*3/UL (ref 0.8–3)
LYMPHOCYTES NFR BLD AUTO: 37.4 %
MCH RBC QN AUTO: 27.4 PG (ref 26–34)
MCHC RBC AUTO-ENTMCNC: 31.6 G/DL (ref 32–36)
MCV RBC AUTO: 87 FL (ref 80–100)
MONOCYTES # BLD AUTO: 1.02 X10*3/UL (ref 0.05–0.8)
MONOCYTES NFR BLD AUTO: 19.6 %
MUCOUS THREADS #/AREA URNS AUTO: ABNORMAL /LPF
NEUTROPHILS # BLD AUTO: 2.04 X10*3/UL (ref 1.6–5.5)
NEUTROPHILS NFR BLD AUTO: 39.2 %
NITRITE UR QL STRIP.AUTO: NEGATIVE
NRBC BLD-RTO: 0.4 /100 WBCS (ref 0–0)
OVALOCYTES BLD QL SMEAR: NORMAL
PH UR STRIP.AUTO: 5 [PH]
PLATELET # BLD AUTO: 128 X10*3/UL (ref 150–450)
POLYCHROMASIA BLD QL SMEAR: NORMAL
POTASSIUM SERPL-SCNC: 4.6 MMOL/L (ref 3.5–5.3)
PROT SERPL-MCNC: 7.9 G/DL (ref 6.4–8.2)
PROT UR STRIP.AUTO-MCNC: ABNORMAL MG/DL
RBC # BLD AUTO: 4.38 X10*6/UL (ref 4.5–5.9)
RBC # UR STRIP.AUTO: NEGATIVE /UL
RBC #/AREA URNS AUTO: ABNORMAL /HPF
RBC MORPH BLD: NORMAL
SCHISTOCYTES BLD QL SMEAR: NORMAL
SODIUM SERPL-SCNC: 140 MMOL/L (ref 136–145)
SP GR UR STRIP.AUTO: 1.02
SPHEROCYTES BLD QL SMEAR: NORMAL
STOMATOCYTES BLD QL SMEAR: NORMAL
TARGETS BLD QL SMEAR: NORMAL
UROBILINOGEN UR STRIP.AUTO-MCNC: 2 MG/DL
WAXY CASTS #/AREA UR COMP ASSIST: ABNORMAL /LPF
WBC # BLD AUTO: 5.2 X10*3/UL (ref 4.4–11.3)
WBC #/AREA URNS AUTO: ABNORMAL /HPF

## 2024-01-02 PROCEDURE — 81001 URINALYSIS AUTO W/SCOPE: CPT | Performed by: INTERNAL MEDICINE

## 2024-01-02 PROCEDURE — 80053 COMPREHEN METABOLIC PANEL: CPT

## 2024-01-02 PROCEDURE — 87340 HEPATITIS B SURFACE AG IA: CPT | Performed by: INTERNAL MEDICINE

## 2024-01-02 PROCEDURE — 86704 HEP B CORE ANTIBODY TOTAL: CPT | Performed by: INTERNAL MEDICINE

## 2024-01-02 PROCEDURE — 82378 CARCINOEMBRYONIC ANTIGEN: CPT | Performed by: INTERNAL MEDICINE

## 2024-01-02 PROCEDURE — 85025 COMPLETE CBC W/AUTO DIFF WBC: CPT

## 2024-01-02 PROCEDURE — 86706 HEP B SURFACE ANTIBODY: CPT | Performed by: INTERNAL MEDICINE

## 2024-01-03 ENCOUNTER — INFUSION (OUTPATIENT)
Dept: HEMATOLOGY/ONCOLOGY | Facility: CLINIC | Age: 74
End: 2024-01-03
Payer: MEDICARE

## 2024-01-03 VITALS
TEMPERATURE: 98.4 F | BODY MASS INDEX: 27.43 KG/M2 | RESPIRATION RATE: 18 BRPM | SYSTOLIC BLOOD PRESSURE: 136 MMHG | HEART RATE: 56 BPM | DIASTOLIC BLOOD PRESSURE: 82 MMHG | WEIGHT: 207.89 LBS

## 2024-01-03 DIAGNOSIS — C20 MALIGNANT NEOPLASM OF RECTUM (MULTI): ICD-10-CM

## 2024-01-03 LAB — CEA SERPL-MCNC: 14.5 UG/L

## 2024-01-03 PROCEDURE — 96413 CHEMO IV INFUSION 1 HR: CPT

## 2024-01-03 PROCEDURE — 2500000004 HC RX 250 GENERAL PHARMACY W/ HCPCS (ALT 636 FOR OP/ED): Performed by: INTERNAL MEDICINE

## 2024-01-03 PROCEDURE — 96411 CHEMO IV PUSH ADDL DRUG: CPT

## 2024-01-03 PROCEDURE — 96375 TX/PRO/DX INJ NEW DRUG ADDON: CPT | Mod: INF

## 2024-01-03 RX ORDER — PALONOSETRON 0.05 MG/ML
0.25 INJECTION, SOLUTION INTRAVENOUS ONCE
Status: COMPLETED | OUTPATIENT
Start: 2024-01-03 | End: 2024-01-03

## 2024-01-03 RX ORDER — FLUOROURACIL 50 MG/ML
400 INJECTION, SOLUTION INTRAVENOUS ONCE
Status: DISCONTINUED | OUTPATIENT
Start: 2024-01-03 | End: 2024-01-03

## 2024-01-03 RX ORDER — DIPHENHYDRAMINE HYDROCHLORIDE 50 MG/ML
50 INJECTION INTRAMUSCULAR; INTRAVENOUS AS NEEDED
Status: DISCONTINUED | OUTPATIENT
Start: 2024-01-03 | End: 2024-01-03 | Stop reason: HOSPADM

## 2024-01-03 RX ORDER — ALBUTEROL SULFATE 0.83 MG/ML
3 SOLUTION RESPIRATORY (INHALATION) AS NEEDED
Status: DISCONTINUED | OUTPATIENT
Start: 2024-01-03 | End: 2024-01-03 | Stop reason: HOSPADM

## 2024-01-03 RX ORDER — FAMOTIDINE 10 MG/ML
20 INJECTION INTRAVENOUS ONCE AS NEEDED
Status: DISCONTINUED | OUTPATIENT
Start: 2024-01-03 | End: 2024-01-03 | Stop reason: HOSPADM

## 2024-01-03 RX ORDER — ATROPINE SULFATE 0.4 MG/ML
0.4 INJECTION, SOLUTION ENDOTRACHEAL; INTRAMEDULLARY; INTRAMUSCULAR; INTRAVENOUS; SUBCUTANEOUS
Status: DISCONTINUED | OUTPATIENT
Start: 2024-01-03 | End: 2024-01-03 | Stop reason: HOSPADM

## 2024-01-03 RX ORDER — PROCHLORPERAZINE MALEATE 10 MG
10 TABLET ORAL EVERY 6 HOURS PRN
Status: DISCONTINUED | OUTPATIENT
Start: 2024-01-03 | End: 2024-01-03 | Stop reason: HOSPADM

## 2024-01-03 RX ORDER — EPINEPHRINE 0.3 MG/.3ML
0.3 INJECTION SUBCUTANEOUS EVERY 5 MIN PRN
Status: DISCONTINUED | OUTPATIENT
Start: 2024-01-03 | End: 2024-01-03 | Stop reason: HOSPADM

## 2024-01-03 RX ORDER — PROCHLORPERAZINE EDISYLATE 5 MG/ML
10 INJECTION INTRAMUSCULAR; INTRAVENOUS EVERY 6 HOURS PRN
Status: DISCONTINUED | OUTPATIENT
Start: 2024-01-03 | End: 2024-01-03 | Stop reason: HOSPADM

## 2024-01-03 RX ADMIN — ATROPINE SULFATE 0.4 MG: 0.4 INJECTION, SOLUTION INTRAVENOUS at 13:27

## 2024-01-03 RX ADMIN — DEXAMETHASONE SODIUM PHOSPHATE 12 MG: 10 INJECTION, SOLUTION INTRAMUSCULAR; INTRAVENOUS at 12:39

## 2024-01-03 RX ADMIN — IRINOTECAN HYDROCHLORIDE 400 MG: 20 INJECTION, SOLUTION INTRAVENOUS at 13:28

## 2024-01-03 RX ADMIN — PALONOSETRON HYDROCHLORIDE 250 MCG: 0.25 INJECTION INTRAVENOUS at 12:37

## 2024-01-03 RX ADMIN — FLUOROURACIL 5350 MG: 50 INJECTION, SOLUTION INTRAVENOUS at 15:18

## 2024-01-03 RX ADMIN — BEVACIZUMAB-AWWB 500 MG: 400 INJECTION, SOLUTION INTRAVENOUS at 13:14

## 2024-01-03 ASSESSMENT — PAIN SCALES - GENERAL: PAINLEVEL: 0-NO PAIN

## 2024-01-03 NOTE — PROGRESS NOTES
1138 - Contacted Dr. Pickard to let him know patients Hepatitis B Surface AB was elevated - he stated this is an antibody so it's actually a good thing. Proceed with treatment as planned.    8267 - Messaged Dr. Pickard to see if he wants patient getting Nplate injection today - he last got it on Wednesday 12/27/23. Dr. Pickard stated he does not want it given today, but would like to recheck patients labs next week. Patient already scheduled for next Friday, Dr. Pickard stated it's ok to have patient come back next Friday 1/12/23 for labs and possible nplate injection.

## 2024-01-05 ENCOUNTER — INFUSION (OUTPATIENT)
Dept: HEMATOLOGY/ONCOLOGY | Facility: CLINIC | Age: 74
End: 2024-01-05
Payer: MEDICARE

## 2024-01-05 ENCOUNTER — APPOINTMENT (OUTPATIENT)
Dept: HEMATOLOGY/ONCOLOGY | Facility: CLINIC | Age: 74
End: 2024-01-05
Payer: MEDICARE

## 2024-01-05 VITALS
RESPIRATION RATE: 18 BRPM | HEART RATE: 54 BPM | OXYGEN SATURATION: 94 % | TEMPERATURE: 96.8 F | DIASTOLIC BLOOD PRESSURE: 88 MMHG | SYSTOLIC BLOOD PRESSURE: 137 MMHG

## 2024-01-05 DIAGNOSIS — D69.3 IDIOPATHIC THROMBOCYTOPENIC PURPURA (ITP) (MULTI): ICD-10-CM

## 2024-01-05 DIAGNOSIS — C20 MALIGNANT NEOPLASM OF RECTUM (MULTI): ICD-10-CM

## 2024-01-05 PROCEDURE — 96523 IRRIG DRUG DELIVERY DEVICE: CPT

## 2024-01-05 PROCEDURE — 2500000004 HC RX 250 GENERAL PHARMACY W/ HCPCS (ALT 636 FOR OP/ED): Performed by: INTERNAL MEDICINE

## 2024-01-05 RX ORDER — HEPARIN SODIUM,PORCINE/PF 10 UNIT/ML
50 SYRINGE (ML) INTRAVENOUS AS NEEDED
Status: CANCELLED | OUTPATIENT
Start: 2024-01-05

## 2024-01-05 RX ORDER — HEPARIN 100 UNIT/ML
500 SYRINGE INTRAVENOUS AS NEEDED
Status: CANCELLED | OUTPATIENT
Start: 2024-01-05

## 2024-01-05 RX ORDER — HEPARIN 100 UNIT/ML
500 SYRINGE INTRAVENOUS AS NEEDED
Status: DISCONTINUED | OUTPATIENT
Start: 2024-01-05 | End: 2024-01-05 | Stop reason: HOSPADM

## 2024-01-05 RX ORDER — HEPARIN SODIUM,PORCINE/PF 10 UNIT/ML
50 SYRINGE (ML) INTRAVENOUS AS NEEDED
Status: DISCONTINUED | OUTPATIENT
Start: 2024-01-05 | End: 2024-01-05 | Stop reason: HOSPADM

## 2024-01-05 RX ADMIN — HEPARIN 500 UNITS: 100 SYRINGE at 13:44

## 2024-01-05 ASSESSMENT — PAIN SCALES - GENERAL: PAINLEVEL: 0-NO PAIN

## 2024-01-05 NOTE — PROGRESS NOTES
Patient here for pump disconnect. He states he has had no new issues since infusion on Wednesday. He hasn't had any diarrhea after getting irinotecan. Patients assessment is not changed since assessment on Wednesday.

## 2024-01-12 ENCOUNTER — INFUSION (OUTPATIENT)
Dept: HEMATOLOGY/ONCOLOGY | Facility: CLINIC | Age: 74
End: 2024-01-12
Payer: MEDICARE

## 2024-01-12 VITALS
OXYGEN SATURATION: 94 % | WEIGHT: 213.41 LBS | RESPIRATION RATE: 18 BRPM | HEART RATE: 59 BPM | TEMPERATURE: 97 F | SYSTOLIC BLOOD PRESSURE: 159 MMHG | DIASTOLIC BLOOD PRESSURE: 81 MMHG | BODY MASS INDEX: 28.16 KG/M2

## 2024-01-12 DIAGNOSIS — C20 MALIGNANT NEOPLASM OF RECTUM (MULTI): ICD-10-CM

## 2024-01-12 DIAGNOSIS — D69.3 IDIOPATHIC THROMBOCYTOPENIC PURPURA (ITP) (MULTI): ICD-10-CM

## 2024-01-12 LAB
ALBUMIN SERPL BCP-MCNC: 3.5 G/DL (ref 3.4–5)
ALP SERPL-CCNC: 88 U/L (ref 33–136)
ALT SERPL W P-5'-P-CCNC: 17 U/L (ref 10–52)
ANION GAP SERPL CALC-SCNC: 13 MMOL/L (ref 10–20)
AST SERPL W P-5'-P-CCNC: 19 U/L (ref 9–39)
BASOPHILS # BLD AUTO: 0.05 X10*3/UL (ref 0–0.1)
BASOPHILS NFR BLD AUTO: 0.9 %
BILIRUB SERPL-MCNC: 0.5 MG/DL (ref 0–1.2)
BUN SERPL-MCNC: 12 MG/DL (ref 6–23)
CALCIUM SERPL-MCNC: 9.3 MG/DL (ref 8.6–10.3)
CHLORIDE SERPL-SCNC: 102 MMOL/L (ref 98–107)
CO2 SERPL-SCNC: 27 MMOL/L (ref 21–32)
CREAT SERPL-MCNC: 0.81 MG/DL (ref 0.5–1.3)
EGFRCR SERPLBLD CKD-EPI 2021: >90 ML/MIN/1.73M*2
EOSINOPHIL # BLD AUTO: 0.08 X10*3/UL (ref 0–0.4)
EOSINOPHIL NFR BLD AUTO: 1.4 %
ERYTHROCYTE [DISTWIDTH] IN BLOOD BY AUTOMATED COUNT: 15.7 % (ref 11.5–14.5)
GLUCOSE SERPL-MCNC: 94 MG/DL (ref 74–99)
HCT VFR BLD AUTO: 32.8 % (ref 41–52)
HGB BLD-MCNC: 10.3 G/DL (ref 13.5–17.5)
IMM GRANULOCYTES # BLD AUTO: 0.02 X10*3/UL (ref 0–0.5)
IMM GRANULOCYTES NFR BLD AUTO: 0.4 % (ref 0–0.9)
LYMPHOCYTES # BLD AUTO: 2.39 X10*3/UL (ref 0.8–3)
LYMPHOCYTES NFR BLD AUTO: 42.8 %
MCH RBC QN AUTO: 27.1 PG (ref 26–34)
MCHC RBC AUTO-ENTMCNC: 31.4 G/DL (ref 32–36)
MCV RBC AUTO: 86 FL (ref 80–100)
MONOCYTES # BLD AUTO: 0.59 X10*3/UL (ref 0.05–0.8)
MONOCYTES NFR BLD AUTO: 10.6 %
NEUTROPHILS # BLD AUTO: 2.45 X10*3/UL (ref 1.6–5.5)
NEUTROPHILS NFR BLD AUTO: 43.9 %
NRBC BLD-RTO: 0.4 /100 WBCS (ref 0–0)
PLATELET # BLD AUTO: 256 X10*3/UL (ref 150–450)
POTASSIUM SERPL-SCNC: 4 MMOL/L (ref 3.5–5.3)
PROT SERPL-MCNC: 6.9 G/DL (ref 6.4–8.2)
RBC # BLD AUTO: 3.8 X10*6/UL (ref 4.5–5.9)
SODIUM SERPL-SCNC: 138 MMOL/L (ref 136–145)
WBC # BLD AUTO: 5.6 X10*3/UL (ref 4.4–11.3)

## 2024-01-12 PROCEDURE — 2500000004 HC RX 250 GENERAL PHARMACY W/ HCPCS (ALT 636 FOR OP/ED): Mod: JW,JG | Performed by: INTERNAL MEDICINE

## 2024-01-12 PROCEDURE — 96372 THER/PROPH/DIAG INJ SC/IM: CPT

## 2024-01-12 PROCEDURE — 80053 COMPREHEN METABOLIC PANEL: CPT | Performed by: INTERNAL MEDICINE

## 2024-01-12 PROCEDURE — 85025 COMPLETE CBC W/AUTO DIFF WBC: CPT | Performed by: INTERNAL MEDICINE

## 2024-01-12 PROCEDURE — 96372 THER/PROPH/DIAG INJ SC/IM: CPT | Performed by: INTERNAL MEDICINE

## 2024-01-12 RX ORDER — ALBUTEROL SULFATE 0.83 MG/ML
3 SOLUTION RESPIRATORY (INHALATION) AS NEEDED
Status: CANCELLED | OUTPATIENT
Start: 2024-01-17

## 2024-01-12 RX ORDER — HEPARIN 100 UNIT/ML
500 SYRINGE INTRAVENOUS AS NEEDED
Status: DISCONTINUED | OUTPATIENT
Start: 2024-01-12 | End: 2024-01-12 | Stop reason: HOSPADM

## 2024-01-12 RX ORDER — FAMOTIDINE 10 MG/ML
20 INJECTION INTRAVENOUS ONCE AS NEEDED
Status: CANCELLED | OUTPATIENT
Start: 2024-01-17

## 2024-01-12 RX ORDER — HEPARIN SODIUM,PORCINE/PF 10 UNIT/ML
50 SYRINGE (ML) INTRAVENOUS AS NEEDED
Status: CANCELLED | OUTPATIENT
Start: 2024-01-12

## 2024-01-12 RX ORDER — HEPARIN SODIUM,PORCINE/PF 10 UNIT/ML
50 SYRINGE (ML) INTRAVENOUS AS NEEDED
Status: DISCONTINUED | OUTPATIENT
Start: 2024-01-12 | End: 2024-01-12 | Stop reason: HOSPADM

## 2024-01-12 RX ORDER — EPINEPHRINE 0.3 MG/.3ML
0.3 INJECTION SUBCUTANEOUS EVERY 5 MIN PRN
Status: CANCELLED | OUTPATIENT
Start: 2024-01-17

## 2024-01-12 RX ORDER — DIPHENHYDRAMINE HYDROCHLORIDE 50 MG/ML
50 INJECTION INTRAMUSCULAR; INTRAVENOUS AS NEEDED
Status: CANCELLED | OUTPATIENT
Start: 2024-01-17

## 2024-01-12 RX ORDER — HEPARIN 100 UNIT/ML
500 SYRINGE INTRAVENOUS AS NEEDED
Status: CANCELLED | OUTPATIENT
Start: 2024-01-12

## 2024-01-12 RX ADMIN — ROMIPLOSTIM 195 MCG: 250 INJECTION, POWDER, LYOPHILIZED, FOR SOLUTION SUBCUTANEOUS at 13:50

## 2024-01-12 RX ADMIN — HEPARIN 500 UNITS: 100 SYRINGE at 13:51

## 2024-01-12 ASSESSMENT — PAIN SCALES - GENERAL: PAINLEVEL: 0-NO PAIN

## 2024-01-15 ENCOUNTER — OFFICE VISIT (OUTPATIENT)
Dept: ENDOCRINOLOGY | Facility: CLINIC | Age: 74
End: 2024-01-15
Payer: MEDICARE

## 2024-01-15 ENCOUNTER — LAB (OUTPATIENT)
Dept: LAB | Facility: LAB | Age: 74
End: 2024-01-15
Payer: MEDICARE

## 2024-01-15 VITALS
WEIGHT: 212 LBS | HEART RATE: 60 BPM | DIASTOLIC BLOOD PRESSURE: 88 MMHG | BODY MASS INDEX: 27.98 KG/M2 | SYSTOLIC BLOOD PRESSURE: 147 MMHG

## 2024-01-15 DIAGNOSIS — E05.00 GRAVES DISEASE: Primary | ICD-10-CM

## 2024-01-15 DIAGNOSIS — E05.00 GRAVES DISEASE: ICD-10-CM

## 2024-01-15 LAB
T4 FREE SERPL-MCNC: 1.01 NG/DL (ref 0.61–1.12)
TSH SERPL-ACNC: 3.28 MIU/L (ref 0.44–3.98)

## 2024-01-15 PROCEDURE — 1126F AMNT PAIN NOTED NONE PRSNT: CPT | Performed by: INTERNAL MEDICINE

## 2024-01-15 PROCEDURE — 1036F TOBACCO NON-USER: CPT | Performed by: INTERNAL MEDICINE

## 2024-01-15 PROCEDURE — 84443 ASSAY THYROID STIM HORMONE: CPT

## 2024-01-15 PROCEDURE — 3077F SYST BP >= 140 MM HG: CPT | Performed by: INTERNAL MEDICINE

## 2024-01-15 PROCEDURE — 3079F DIAST BP 80-89 MM HG: CPT | Performed by: INTERNAL MEDICINE

## 2024-01-15 PROCEDURE — 99213 OFFICE O/P EST LOW 20 MIN: CPT | Performed by: INTERNAL MEDICINE

## 2024-01-15 PROCEDURE — 84439 ASSAY OF FREE THYROXINE: CPT

## 2024-01-15 PROCEDURE — 1159F MED LIST DOCD IN RCRD: CPT | Performed by: INTERNAL MEDICINE

## 2024-01-15 ASSESSMENT — ENCOUNTER SYMPTOMS: UNEXPECTED WEIGHT CHANGE: 0

## 2024-01-15 NOTE — PROGRESS NOTES
History Of Present Illness  Bobby Holland is a 73 y.o. male     Interval diagnosis of colorectal cancer  Chemotherapy changed from FOLFOX to   Bevocizumab, fluorouracil, leucovorin, irinotecan    Past Medical History  He has a past medical history of Immune thrombocytopenic purpura (CMS/HCC) (03/11/2014).    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     Social History  He reports that he has never smoked. He has never been exposed to tobacco smoke. He has never used smokeless tobacco. He reports current alcohol use of about 2.0 standard drinks of alcohol per week. He reports that he does not use drugs.    Family History  No family history on file.    Allergies  Patient has no known allergies.    Medications  Current Outpatient Medications   Medication Instructions    methIMAzole (TAPAZOLE) 2.5 mg, oral, Daily    propranolol (INDERAL) 60 mg, oral, 3 times daily       Review of Systems   Constitutional:  Negative for unexpected weight change.   Eyes:  Negative for visual disturbance.         Last Recorded Vitals  Blood pressure 147/88, pulse 60, weight 96.2 kg (212 lb).    Physical Exam  HENT:      Head: Normocephalic.   Eyes:      Extraocular Movements: Extraocular movements intact.      Comments: Mild prominence of eyes   Neurological:      Mental Status: He is alert.   Psychiatric:         Mood and Affect: Affect normal.          Relevant Results  Lab Results   Component Value Date    TSH 6.03 (H) 11/14/2023    FREET4 1.02 11/14/2023    T3FREE 8.7 (H) 06/24/2018    RECE 1.68 03/18/2023    THYROIDPAB 36 (H) 06/23/2018    TSI 5.2 (H) 11/11/2019       IMPRESSION  GRAVES' DISEASE  Methimazole x5+ years  Currently hypothyroid on the lowest methimazole dose  TRAB resolved      RECOMMENDATIONS  Stop methimazole  Draw TSH and free T4 with chemotherapy labs  Results available on [employer]  Call/message if you have not received results in 3 days.     Follow up 2-3  months

## 2024-01-15 NOTE — PATIENT INSTRUCTIONS
RECOMMENDATIONS  Stop methimazole  Draw TSH and free T4 with chemotherapy labs  Results available on [facility]  Call/message if you have not received results in 3 days.     Follow up 2-3 months

## 2024-01-15 NOTE — LETTER
January 15, 2024     Bobby Pickard MD  [id number]  [facility] [address]    Patient: Bobby Holland   YOB: 1950   Date of Visit: 1/15/2024       Dear Dr. Bobby Pickard MD:    Thank you for referring Bobby Holland to me for evaluation. Below are my notes for this consultation.  If you have questions, please do not hesitate to call me. I look forward to following your patient along with you.       Sincerely,     Juliano Clancy MD      CC: No Recipients  ______________________________________________________________________________________    History Of Present Illness  Bobby Holland is a 73 y.o. male     Interval diagnosis of colorectal cancer  Chemotherapy changed from FOLFOX to   Bevocizumab, fluorouracil, leucovorin, irinotecan    Past Medical History  He has a past medical history of Immune thrombocytopenic purpura (CMS/HCC) (03/11/2014).    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     Social History  He reports that he has never smoked. He has never been exposed to tobacco smoke. He has never used smokeless tobacco. He reports current alcohol use of about 2.0 standard drinks of alcohol per week. He reports that he does not use drugs.    Family History  No family history on file.    Allergies  Patient has no known allergies.    Medications  Current Outpatient Medications   Medication Instructions   • methIMAzole (TAPAZOLE) 2.5 mg, oral, Daily   • propranolol (INDERAL) 60 mg, oral, 3 times daily       Review of Systems   Constitutional:  Negative for unexpected weight change.   Eyes:  Negative for visual disturbance.         Last Recorded Vitals  Blood pressure 147/88, pulse 60, weight 96.2 kg (212 lb).    Physical Exam  HENT:      Head: Normocephalic.   Eyes:      Extraocular Movements: Extraocular movements intact.      Comments: Mild prominence of eyes   Neurological:      Mental Status: He is alert.   Psychiatric:         Mood and  Affect: Affect normal.          Relevant Results  Lab Results   Component Value Date    TSH 6.03 (H) 11/14/2023    FREET4 1.02 11/14/2023    T3FREE 8.7 (H) 06/24/2018    RECE 1.68 03/18/2023    THYROIDPAB 36 (H) 06/23/2018    TSI 5.2 (H) 11/11/2019       IMPRESSION  GRAVES' DISEASE  Methimazole x5+ years  Currently hypothyroid on the lowest methimazole dose  TRAB resolved      RECOMMENDATIONS  Stop methimazole  Draw TSH and free T4 with chemotherapy labs  Results available on [employer]  Call/message if you have not received results in 3 days.     Follow up 2-3 months

## 2024-01-16 ENCOUNTER — OFFICE VISIT (OUTPATIENT)
Dept: HEMATOLOGY/ONCOLOGY | Facility: CLINIC | Age: 74
End: 2024-01-16
Payer: MEDICARE

## 2024-01-16 ENCOUNTER — LAB (OUTPATIENT)
Dept: LAB | Facility: CLINIC | Age: 74
End: 2024-01-16
Payer: MEDICARE

## 2024-01-16 VITALS
HEART RATE: 63 BPM | OXYGEN SATURATION: 96 % | TEMPERATURE: 97 F | WEIGHT: 213.19 LBS | BODY MASS INDEX: 28.13 KG/M2 | RESPIRATION RATE: 18 BRPM | SYSTOLIC BLOOD PRESSURE: 143 MMHG | DIASTOLIC BLOOD PRESSURE: 78 MMHG

## 2024-01-16 DIAGNOSIS — C20 MALIGNANT NEOPLASM OF RECTUM (MULTI): ICD-10-CM

## 2024-01-16 DIAGNOSIS — C20 MALIGNANT NEOPLASM OF RECTUM (MULTI): Primary | ICD-10-CM

## 2024-01-16 DIAGNOSIS — D69.3 IDIOPATHIC THROMBOCYTOPENIC PURPURA (ITP) (MULTI): ICD-10-CM

## 2024-01-16 LAB
ALBUMIN SERPL BCP-MCNC: 3.6 G/DL (ref 3.4–5)
ALP SERPL-CCNC: 108 U/L (ref 33–136)
ALT SERPL W P-5'-P-CCNC: 21 U/L (ref 10–52)
ANION GAP SERPL CALC-SCNC: 13 MMOL/L (ref 10–20)
AST SERPL W P-5'-P-CCNC: 25 U/L (ref 9–39)
BASOPHILS # BLD AUTO: 0.06 X10*3/UL (ref 0–0.1)
BASOPHILS NFR BLD AUTO: 1.1 %
BILIRUB SERPL-MCNC: 0.6 MG/DL (ref 0–1.2)
BUN SERPL-MCNC: 14 MG/DL (ref 6–23)
CALCIUM SERPL-MCNC: 9.7 MG/DL (ref 8.6–10.6)
CHLORIDE SERPL-SCNC: 103 MMOL/L (ref 98–107)
CO2 SERPL-SCNC: 29 MMOL/L (ref 21–32)
CREAT SERPL-MCNC: 0.85 MG/DL (ref 0.5–1.3)
EGFRCR SERPLBLD CKD-EPI 2021: >90 ML/MIN/1.73M*2
EOSINOPHIL # BLD AUTO: 0.1 X10*3/UL (ref 0–0.4)
EOSINOPHIL NFR BLD AUTO: 1.8 %
ERYTHROCYTE [DISTWIDTH] IN BLOOD BY AUTOMATED COUNT: 15.7 % (ref 11.5–14.5)
GLUCOSE SERPL-MCNC: 98 MG/DL (ref 74–99)
HCT VFR BLD AUTO: 35.9 % (ref 41–52)
HGB BLD-MCNC: 11.3 G/DL (ref 13.5–17.5)
IMM GRANULOCYTES # BLD AUTO: 0.01 X10*3/UL (ref 0–0.5)
IMM GRANULOCYTES NFR BLD AUTO: 0.2 % (ref 0–0.9)
LYMPHOCYTES # BLD AUTO: 2.52 X10*3/UL (ref 0.8–3)
LYMPHOCYTES NFR BLD AUTO: 45.7 %
MCH RBC QN AUTO: 27.5 PG (ref 26–34)
MCHC RBC AUTO-ENTMCNC: 31.5 G/DL (ref 32–36)
MCV RBC AUTO: 87 FL (ref 80–100)
MONOCYTES # BLD AUTO: 0.85 X10*3/UL (ref 0.05–0.8)
MONOCYTES NFR BLD AUTO: 15.4 %
NEUTROPHILS # BLD AUTO: 1.98 X10*3/UL (ref 1.6–5.5)
NEUTROPHILS NFR BLD AUTO: 35.8 %
NRBC BLD-RTO: ABNORMAL /100{WBCS}
PLATELET # BLD AUTO: 227 X10*3/UL (ref 150–450)
POTASSIUM SERPL-SCNC: 4 MMOL/L (ref 3.5–5.3)
PROT SERPL-MCNC: 7.2 G/DL (ref 6.4–8.2)
RBC # BLD AUTO: 4.11 X10*6/UL (ref 4.5–5.9)
SODIUM SERPL-SCNC: 141 MMOL/L (ref 136–145)
WBC # BLD AUTO: 5.5 X10*3/UL (ref 4.4–11.3)

## 2024-01-16 PROCEDURE — 3078F DIAST BP <80 MM HG: CPT | Performed by: INTERNAL MEDICINE

## 2024-01-16 PROCEDURE — 99213 OFFICE O/P EST LOW 20 MIN: CPT | Performed by: INTERNAL MEDICINE

## 2024-01-16 PROCEDURE — 85025 COMPLETE CBC W/AUTO DIFF WBC: CPT

## 2024-01-16 PROCEDURE — 1126F AMNT PAIN NOTED NONE PRSNT: CPT | Performed by: INTERNAL MEDICINE

## 2024-01-16 PROCEDURE — 84075 ASSAY ALKALINE PHOSPHATASE: CPT | Performed by: INTERNAL MEDICINE

## 2024-01-16 PROCEDURE — 3077F SYST BP >= 140 MM HG: CPT | Performed by: INTERNAL MEDICINE

## 2024-01-16 PROCEDURE — 1159F MED LIST DOCD IN RCRD: CPT | Performed by: INTERNAL MEDICINE

## 2024-01-16 PROCEDURE — 1036F TOBACCO NON-USER: CPT | Performed by: INTERNAL MEDICINE

## 2024-01-16 RX ORDER — ALBUTEROL SULFATE 0.83 MG/ML
3 SOLUTION RESPIRATORY (INHALATION) AS NEEDED
Status: CANCELLED | OUTPATIENT
Start: 2024-01-17

## 2024-01-16 RX ORDER — PROCHLORPERAZINE EDISYLATE 5 MG/ML
10 INJECTION INTRAMUSCULAR; INTRAVENOUS EVERY 6 HOURS PRN
Status: CANCELLED | OUTPATIENT
Start: 2024-01-17

## 2024-01-16 RX ORDER — FAMOTIDINE 10 MG/ML
20 INJECTION INTRAVENOUS ONCE AS NEEDED
Status: CANCELLED | OUTPATIENT
Start: 2024-01-17

## 2024-01-16 RX ORDER — DIPHENHYDRAMINE HYDROCHLORIDE 50 MG/ML
50 INJECTION INTRAMUSCULAR; INTRAVENOUS AS NEEDED
Status: CANCELLED | OUTPATIENT
Start: 2024-01-17

## 2024-01-16 RX ORDER — ATROPINE SULFATE 0.4 MG/ML
0.4 INJECTION, SOLUTION ENDOTRACHEAL; INTRAMEDULLARY; INTRAMUSCULAR; INTRAVENOUS; SUBCUTANEOUS
Status: CANCELLED | OUTPATIENT
Start: 2024-01-17

## 2024-01-16 RX ORDER — PROCHLORPERAZINE MALEATE 10 MG
10 TABLET ORAL EVERY 6 HOURS PRN
Status: CANCELLED | OUTPATIENT
Start: 2024-01-17

## 2024-01-16 RX ORDER — PALONOSETRON 0.05 MG/ML
0.25 INJECTION, SOLUTION INTRAVENOUS ONCE
Status: CANCELLED | OUTPATIENT
Start: 2024-01-17

## 2024-01-16 RX ORDER — EPINEPHRINE 0.3 MG/.3ML
0.3 INJECTION SUBCUTANEOUS EVERY 5 MIN PRN
Status: CANCELLED | OUTPATIENT
Start: 2024-01-17

## 2024-01-16 ASSESSMENT — PAIN SCALES - GENERAL: PAINLEVEL: 0-NO PAIN

## 2024-01-16 NOTE — PROGRESS NOTES
Patient here for follow up visit. Denies n/v/d; no other complaints. Medications and allergies reviewed with patient.

## 2024-01-16 NOTE — PROGRESS NOTES
Patient ID: Bobby Holland is a 73 y.o. male from [place], [place].     Referring Physician: Bobby Pickard MD  [address] [place] [address]    Primary Care Provider: Bobby Pickard MD    Cancer History:   Treatment Synopsis:    DIAGNOSIS  Rectal Cancer 5/2023     STAGING  [id number]     CURRENT SITES OF DISEASE  Rectum, liver and lung     MOLECULAR GENOMICS    Test Name: Decisyon xT (XT.V4)  Laboratory Name: [facility]  Specimen Source: Colon, rectosigmoid  Collection Date: 15-May-2023  Cancer Type: Invasive adenocarcinoma  Report Id: [id number]    Molecular Findings:  * Gene: KRAS, Variant: Missense variant (exon 2) - GOF, Potentially Actionable, p.G12V (Allele Frequency - 16.6%)  * Gene: APC, Variant: Frameshift - LOF, Biologically Relevant, p.B5151fa (Allele Frequency - 26.6%)  * Gene: APC, Variant: Stop gain - LOF, Biologically Relevant, p.R213*, Nonsense (Allele Frequency - 20.2%)  * Gene: FBXW7, Variant: Missense variant - LOF, Biologically Relevant, p.R505C (Allele Frequency - 23.3%)  * Gene: CCND1, Variant: Amplification, Copy number gain, Biologically Relevant  * Gene: FGF3, Variant: Amplification, Copy number gain, Biologically Relevant  * Gene: FGF4, Variant: Amplification, Copy number gain, Biologically Relevant  * Biomarker: Microsatellite Instability, Status: stable  * Biomarker: Tumor Mutation [place] (5.8 Muts/Mb, Percentile: 63.0)  * Disease associated genes/variants with no reportable findings:     * BRAF    * NRAS  * 7 variants of unknown significance    ** End of copied information **     SERUM TUMOR MARKER  CEA 4.4 - June 2023     CURRENT THERAPY  July 2023 - FOLFOX      PRIOR THERAPY  None     DEVICES  mediport     CURRENT PROBLEM LIST   ITP  - spleenectomy 2012   Graves ds  Metastatic colorectal cancer        Treatment History:    73 y.o.  male presenting to the emergency room with hematochezia in early April hemoglobin low taken off Eliquis.  Bleeding initially resolved.   Anoscopy in May  2023 showed a large circumferential nonobstructing mass 7 cm in size at 9 cm from the anal verge.  Biopsy showed moderately differentiated adenocarcinoma.  MMR was proficient.  CEA was 4.7.  Imaging with Dr. Mccrary which showed lung and liver metastases.   PET scan confirmed FDG avidity of these lesions.          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  Social History:  Bobby reports that he has never smoked. He has never been exposed to tobacco smoke. He has never used smokeless tobacco. He reports current alcohol use of about 2.0 standard drinks of alcohol per week. He reports that he does not use drugs.  Family History:  No family history on file.  Family Oncology History:  Cancer-related family history is not on file.        SUBJECTIVE:    History of Present Illness:  Bobby Holland is a 73 y.o. male who was referred by Bobby Pickard MD and presents with Follow-up    Good appetite.  Weight stableized.  No neuropathy.  No bleeding.  A complete 10 point ROS is otherwise negative in detail.      OBJECTIVE:    VS / Pain:  /78   Pulse 63   Temp 36.1 °C (97 °F)   Resp 18   Wt 96.7 kg (213 lb 3 oz)   SpO2 96%   BMI 28.13 kg/m²   BSA: 2.23 meters squared   Pain Scale: 0    Daily Weight  01/16/24 : 96.7 kg (213 lb 3 oz)  01/15/24 : 96.2 kg (212 lb)  01/12/24 : 96.8 kg (213 lb 6.5 oz)  01/03/24 : 94.3 kg (207 lb 14.3 oz)  12/27/23 : 93.8 kg (206 lb 12.7 oz)  12/19/23 : 95.6 kg (210 lb 12.2 oz)  12/15/23 : 98.2 kg (216 lb 7.9 oz)      Physical Exam  Constitutional:       Appearance: He is normal weight.   HENT:      Nose: Nose normal.      Mouth/Throat:      Mouth: Mucous membranes are moist.   Eyes:      Pupils: Pupils are equal, round, and reactive to light.   Cardiovascular:      Rate and Rhythm: Normal rate.       Pulses: Normal pulses.   Abdominal:      General: Abdomen is flat.   Musculoskeletal:         General: Normal range of motion.   Skin:     General: Skin is warm.   Neurological:      General: No focal deficit present.      Mental Status: He is alert.   Psychiatric:         Mood and Affect: Mood normal.         Judgment: Judgment normal.       Performance Status:   ECOG 1    Diagnostic Results         WBC   Date/Time Value Ref Range Status   01/12/2024 01:21 PM 5.6 4.4 - 11.3 x10*3/uL Final   01/02/2024 09:13 AM 5.2 4.4 - 11.3 x10*3/uL Final   12/27/2023 08:24 AM 5.6 4.4 - 11.3 x10*3/uL Final     Hemoglobin   Date Value Ref Range Status   01/12/2024 10.3 (L) 13.5 - 17.5 g/dL Final   01/02/2024 12.0 (L) 13.5 - 17.5 g/dL Final   12/27/2023 11.1 (L) 13.5 - 17.5 g/dL Final     MCV   Date/Time Value Ref Range Status   01/12/2024 01:21 PM 86 80 - 100 fL Final   01/02/2024 09:13 AM 87 80 - 100 fL Final   12/27/2023 08:24 AM 87 80 - 100 fL Final     Platelets   Date/Time Value Ref Range Status   01/12/2024 01:21  150 - 450 x10*3/uL Final   01/02/2024 09:13  (L) 150 - 450 x10*3/uL Final   12/27/2023 08:24 AM 17 (LL) 150 - 450 x10*3/uL Final     Neutrophils Absolute   Date/Time Value Ref Range Status   01/12/2024 01:21 PM 2.45 1.60 - 5.50 x10*3/uL Final     Comment:     Percent differential counts (%) should be interpreted in the context of the absolute cell counts (cells/uL).   01/02/2024 09:13 AM 2.04 1.60 - 5.50 x10*3/uL Final     Comment:     Percent differential counts (%) should be interpreted in the context of the absolute cell counts (cells/uL).   12/27/2023 08:24 AM 2.28 1.60 - 5.50 x10*3/uL Final     Comment:     Percent differential counts (%) should be interpreted in the context of the absolute cell counts (cells/uL).     Bilirubin, Total   Date/Time Value Ref Range Status   01/12/2024 01:21 PM 0.5 0.0 - 1.2 mg/dL Final   01/02/2024 09:13 AM 0.6 0.0 - 1.2 mg/dL Final   12/27/2023 08:24 AM 0.7 0.0 - 1.2  "mg/dL Final     AST   Date/Time Value Ref Range Status   01/12/2024 01:21 PM 19 9 - 39 U/L Final   01/02/2024 09:13 AM 24 9 - 39 U/L Final   12/27/2023 08:24 AM 25 9 - 39 U/L Final     ALT   Date/Time Value Ref Range Status   01/12/2024 01:21 PM 17 10 - 52 U/L Final     Comment:     Patients treated with Sulfasalazine may generate falsely decreased results for ALT.   01/02/2024 09:13 AM 19 10 - 52 U/L Final     Comment:     Patients treated with Sulfasalazine may generate falsely decreased results for ALT.   12/27/2023 08:24 AM 19 10 - 52 U/L Final     Comment:     Patients treated with Sulfasalazine may generate falsely decreased results for ALT.     Creatinine   Date/Time Value Ref Range Status   01/12/2024 01:21 PM 0.81 0.50 - 1.30 mg/dL Final   01/02/2024 09:13 AM 1.01 0.50 - 1.30 mg/dL Final   12/27/2023 08:24 AM 0.92 0.50 - 1.30 mg/dL Final     Urea Nitrogen   Date/Time Value Ref Range Status   01/12/2024 01:21 PM 12 6 - 23 mg/dL Final   01/02/2024 09:13 AM 15 6 - 23 mg/dL Final   12/27/2023 08:24 AM 16 6 - 23 mg/dL Final     Albumin   Date/Time Value Ref Range Status   01/12/2024 01:21 PM 3.5 3.4 - 5.0 g/dL Final   01/02/2024 09:13 AM 3.8 3.4 - 5.0 g/dL Final   12/27/2023 08:24 AM 3.6 3.4 - 5.0 g/dL Final     No results found for: \"\"  Carcinoembryonic AG   Date/Time Value Ref Range Status   01/02/2024 09:13 AM 14.5 ug/L Final   12/27/2023 08:24 AM 13.4 ug/L Final   12/13/2023 12:41 PM 11.3 ug/L Final     .    Assessment/Plan     Assessment:    73 y.o. man with metastatic rectal cancer with liver and lung lesions diagnosed originally in 6/2023. Developed progressive disease in 12/2023.  He started FOLFIRI + Bevacizumab after that.      Plan:  Stage IV rectal Ca:  - Continue 5FU + Irinotecan + bevacizumab    H/o ITP -     - s/p splencectomy   -   plt cutoff is 75,000 for chemotherapy   - needs romiplostim every other week during chemo.       Bobby Pickard MD     [facility] " [facility]/ [facility]  Office: [phone]  Fax: [phone]

## 2024-01-17 ENCOUNTER — INFUSION (OUTPATIENT)
Dept: HEMATOLOGY/ONCOLOGY | Facility: CLINIC | Age: 74
End: 2024-01-17
Payer: MEDICARE

## 2024-01-17 VITALS
SYSTOLIC BLOOD PRESSURE: 149 MMHG | HEART RATE: 55 BPM | TEMPERATURE: 97.2 F | DIASTOLIC BLOOD PRESSURE: 84 MMHG | WEIGHT: 208.34 LBS | BODY MASS INDEX: 27.49 KG/M2 | RESPIRATION RATE: 18 BRPM

## 2024-01-17 DIAGNOSIS — C20 MALIGNANT NEOPLASM OF RECTUM (MULTI): ICD-10-CM

## 2024-01-17 PROCEDURE — 2500000004 HC RX 250 GENERAL PHARMACY W/ HCPCS (ALT 636 FOR OP/ED): Performed by: INTERNAL MEDICINE

## 2024-01-17 PROCEDURE — 96375 TX/PRO/DX INJ NEW DRUG ADDON: CPT | Mod: INF

## 2024-01-17 PROCEDURE — 96411 CHEMO IV PUSH ADDL DRUG: CPT

## 2024-01-17 PROCEDURE — 96413 CHEMO IV INFUSION 1 HR: CPT

## 2024-01-17 RX ORDER — ALBUTEROL SULFATE 0.83 MG/ML
3 SOLUTION RESPIRATORY (INHALATION) AS NEEDED
Status: DISCONTINUED | OUTPATIENT
Start: 2024-01-17 | End: 2024-01-17 | Stop reason: HOSPADM

## 2024-01-17 RX ORDER — PALONOSETRON 0.05 MG/ML
0.25 INJECTION, SOLUTION INTRAVENOUS ONCE
Status: COMPLETED | OUTPATIENT
Start: 2024-01-17 | End: 2024-01-17

## 2024-01-17 RX ORDER — EPINEPHRINE 0.3 MG/.3ML
0.3 INJECTION SUBCUTANEOUS EVERY 5 MIN PRN
Status: DISCONTINUED | OUTPATIENT
Start: 2024-01-17 | End: 2024-01-17 | Stop reason: HOSPADM

## 2024-01-17 RX ORDER — ATROPINE SULFATE 0.4 MG/ML
0.4 INJECTION, SOLUTION ENDOTRACHEAL; INTRAMEDULLARY; INTRAMUSCULAR; INTRAVENOUS; SUBCUTANEOUS
Status: DISCONTINUED | OUTPATIENT
Start: 2024-01-17 | End: 2024-01-17 | Stop reason: HOSPADM

## 2024-01-17 RX ORDER — DIPHENHYDRAMINE HYDROCHLORIDE 50 MG/ML
50 INJECTION INTRAMUSCULAR; INTRAVENOUS AS NEEDED
Status: DISCONTINUED | OUTPATIENT
Start: 2024-01-17 | End: 2024-01-17 | Stop reason: HOSPADM

## 2024-01-17 RX ORDER — PROCHLORPERAZINE EDISYLATE 5 MG/ML
10 INJECTION INTRAMUSCULAR; INTRAVENOUS EVERY 6 HOURS PRN
Status: DISCONTINUED | OUTPATIENT
Start: 2024-01-17 | End: 2024-01-17 | Stop reason: HOSPADM

## 2024-01-17 RX ORDER — FAMOTIDINE 10 MG/ML
20 INJECTION INTRAVENOUS ONCE AS NEEDED
Status: DISCONTINUED | OUTPATIENT
Start: 2024-01-17 | End: 2024-01-17 | Stop reason: HOSPADM

## 2024-01-17 RX ORDER — PROCHLORPERAZINE MALEATE 10 MG
10 TABLET ORAL EVERY 6 HOURS PRN
Status: DISCONTINUED | OUTPATIENT
Start: 2024-01-17 | End: 2024-01-17 | Stop reason: HOSPADM

## 2024-01-17 RX ADMIN — IRINOTECAN HYDROCHLORIDE 400 MG: 20 INJECTION, SOLUTION INTRAVENOUS at 13:21

## 2024-01-17 RX ADMIN — PALONOSETRON HYDROCHLORIDE 250 MCG: 0.25 INJECTION INTRAVENOUS at 12:41

## 2024-01-17 RX ADMIN — FLUOROURACIL 5350 MG: 50 INJECTION, SOLUTION INTRAVENOUS at 14:58

## 2024-01-17 RX ADMIN — ATROPINE SULFATE 0.4 MG: 0.4 INJECTION, SOLUTION INTRAVENOUS at 13:20

## 2024-01-17 RX ADMIN — BEVACIZUMAB-AWWB 500 MG: 400 INJECTION, SOLUTION INTRAVENOUS at 13:03

## 2024-01-17 RX ADMIN — DEXAMETHASONE SODIUM PHOSPHATE 12 MG: 10 INJECTION, SOLUTION INTRAMUSCULAR; INTRAVENOUS at 12:42

## 2024-01-19 ENCOUNTER — INFUSION (OUTPATIENT)
Dept: HEMATOLOGY/ONCOLOGY | Facility: CLINIC | Age: 74
End: 2024-01-19
Payer: MEDICARE

## 2024-01-19 VITALS
RESPIRATION RATE: 18 BRPM | SYSTOLIC BLOOD PRESSURE: 132 MMHG | OXYGEN SATURATION: 98 % | WEIGHT: 209 LBS | TEMPERATURE: 97 F | DIASTOLIC BLOOD PRESSURE: 82 MMHG | HEART RATE: 54 BPM | BODY MASS INDEX: 27.58 KG/M2

## 2024-01-19 DIAGNOSIS — C20 MALIGNANT NEOPLASM OF RECTUM (MULTI): ICD-10-CM

## 2024-01-19 DIAGNOSIS — D69.3 IDIOPATHIC THROMBOCYTOPENIC PURPURA (ITP) (MULTI): ICD-10-CM

## 2024-01-19 PROCEDURE — 2500000004 HC RX 250 GENERAL PHARMACY W/ HCPCS (ALT 636 FOR OP/ED): Performed by: INTERNAL MEDICINE

## 2024-01-19 PROCEDURE — 96523 IRRIG DRUG DELIVERY DEVICE: CPT

## 2024-01-19 RX ORDER — HEPARIN SODIUM,PORCINE/PF 10 UNIT/ML
50 SYRINGE (ML) INTRAVENOUS AS NEEDED
Status: CANCELLED | OUTPATIENT
Start: 2024-01-19

## 2024-01-19 RX ORDER — HEPARIN 100 UNIT/ML
500 SYRINGE INTRAVENOUS AS NEEDED
Status: DISCONTINUED | OUTPATIENT
Start: 2024-01-19 | End: 2024-01-19 | Stop reason: HOSPADM

## 2024-01-19 RX ORDER — HEPARIN 100 UNIT/ML
500 SYRINGE INTRAVENOUS AS NEEDED
Status: CANCELLED | OUTPATIENT
Start: 2024-01-19

## 2024-01-19 RX ORDER — HEPARIN SODIUM,PORCINE/PF 10 UNIT/ML
50 SYRINGE (ML) INTRAVENOUS AS NEEDED
Status: DISCONTINUED | OUTPATIENT
Start: 2024-01-19 | End: 2024-01-19 | Stop reason: HOSPADM

## 2024-01-19 RX ADMIN — HEPARIN 500 UNITS: 100 SYRINGE at 13:17

## 2024-01-19 ASSESSMENT — PAIN SCALES - GENERAL: PAINLEVEL: 0-NO PAIN

## 2024-01-19 NOTE — PROGRESS NOTES
1317 - Patient here for pump disconnect. He states he feels well - no different than he felt on Wednesday during treatment. No diarrhea and no neuropathy. Patient has no questions or concerns. He taught back his schedule, stating he'll be back next Friday for his NPLATE injection (if needed). According to Dr. Sanon most recent note, patient is to get NPLATE q2 weeks - he got it last on January 12th.

## 2024-01-26 ENCOUNTER — INFUSION (OUTPATIENT)
Dept: HEMATOLOGY/ONCOLOGY | Facility: CLINIC | Age: 74
End: 2024-01-26
Payer: MEDICARE

## 2024-01-26 VITALS
HEART RATE: 63 BPM | OXYGEN SATURATION: 94 % | BODY MASS INDEX: 27.99 KG/M2 | TEMPERATURE: 97.5 F | RESPIRATION RATE: 18 BRPM | WEIGHT: 212.08 LBS | DIASTOLIC BLOOD PRESSURE: 92 MMHG | SYSTOLIC BLOOD PRESSURE: 157 MMHG

## 2024-01-26 DIAGNOSIS — D69.3 IDIOPATHIC THROMBOCYTOPENIC PURPURA (ITP) (MULTI): ICD-10-CM

## 2024-01-26 DIAGNOSIS — C20 MALIGNANT NEOPLASM OF RECTUM (MULTI): ICD-10-CM

## 2024-01-26 LAB
ALBUMIN SERPL BCP-MCNC: 3.5 G/DL (ref 3.4–5)
ALP SERPL-CCNC: 92 U/L (ref 33–136)
ALT SERPL W P-5'-P-CCNC: 15 U/L (ref 10–52)
ANION GAP SERPL CALC-SCNC: 14 MMOL/L (ref 10–20)
AST SERPL W P-5'-P-CCNC: 16 U/L (ref 9–39)
BASOPHILS # BLD AUTO: 0.07 X10*3/UL (ref 0–0.1)
BASOPHILS NFR BLD AUTO: 1.1 %
BILIRUB SERPL-MCNC: 0.5 MG/DL (ref 0–1.2)
BUN SERPL-MCNC: 14 MG/DL (ref 6–23)
CALCIUM SERPL-MCNC: 9.1 MG/DL (ref 8.6–10.3)
CHLORIDE SERPL-SCNC: 102 MMOL/L (ref 98–107)
CO2 SERPL-SCNC: 26 MMOL/L (ref 21–32)
CREAT SERPL-MCNC: 0.78 MG/DL (ref 0.5–1.3)
EGFRCR SERPLBLD CKD-EPI 2021: >90 ML/MIN/1.73M*2
EOSINOPHIL # BLD AUTO: 0.08 X10*3/UL (ref 0–0.4)
EOSINOPHIL NFR BLD AUTO: 1.3 %
ERYTHROCYTE [DISTWIDTH] IN BLOOD BY AUTOMATED COUNT: 16.5 % (ref 11.5–14.5)
GLUCOSE SERPL-MCNC: 95 MG/DL (ref 74–99)
HCT VFR BLD AUTO: 34 % (ref 41–52)
HGB BLD-MCNC: 10.9 G/DL (ref 13.5–17.5)
IMM GRANULOCYTES # BLD AUTO: 0.02 X10*3/UL (ref 0–0.5)
IMM GRANULOCYTES NFR BLD AUTO: 0.3 % (ref 0–0.9)
LYMPHOCYTES # BLD AUTO: 2.99 X10*3/UL (ref 0.8–3)
LYMPHOCYTES NFR BLD AUTO: 46.9 %
MCH RBC QN AUTO: 27.5 PG (ref 26–34)
MCHC RBC AUTO-ENTMCNC: 32.1 G/DL (ref 32–36)
MCV RBC AUTO: 86 FL (ref 80–100)
MONOCYTES # BLD AUTO: 1.18 X10*3/UL (ref 0.05–0.8)
MONOCYTES NFR BLD AUTO: 18.5 %
NEUTROPHILS # BLD AUTO: 2.04 X10*3/UL (ref 1.6–5.5)
NEUTROPHILS NFR BLD AUTO: 31.9 %
NRBC BLD-RTO: 0.6 /100 WBCS (ref 0–0)
PLATELET # BLD AUTO: 386 X10*3/UL (ref 150–450)
POTASSIUM SERPL-SCNC: 3.8 MMOL/L (ref 3.5–5.3)
PROT SERPL-MCNC: 7.1 G/DL (ref 6.4–8.2)
RBC # BLD AUTO: 3.96 X10*6/UL (ref 4.5–5.9)
SODIUM SERPL-SCNC: 138 MMOL/L (ref 136–145)
WBC # BLD AUTO: 6.4 X10*3/UL (ref 4.4–11.3)

## 2024-01-26 PROCEDURE — 36591 DRAW BLOOD OFF VENOUS DEVICE: CPT

## 2024-01-26 PROCEDURE — 96523 IRRIG DRUG DELIVERY DEVICE: CPT

## 2024-01-26 PROCEDURE — 85025 COMPLETE CBC W/AUTO DIFF WBC: CPT | Performed by: INTERNAL MEDICINE

## 2024-01-26 PROCEDURE — 80053 COMPREHEN METABOLIC PANEL: CPT | Performed by: INTERNAL MEDICINE

## 2024-01-26 RX ORDER — HEPARIN SODIUM,PORCINE/PF 10 UNIT/ML
50 SYRINGE (ML) INTRAVENOUS AS NEEDED
Status: CANCELLED | OUTPATIENT
Start: 2024-01-26

## 2024-01-26 RX ORDER — HEPARIN 100 UNIT/ML
500 SYRINGE INTRAVENOUS AS NEEDED
Status: DISCONTINUED | OUTPATIENT
Start: 2024-01-26 | End: 2024-01-26 | Stop reason: HOSPADM

## 2024-01-26 RX ORDER — HEPARIN SODIUM,PORCINE/PF 10 UNIT/ML
50 SYRINGE (ML) INTRAVENOUS AS NEEDED
Status: DISCONTINUED | OUTPATIENT
Start: 2024-01-26 | End: 2024-01-26 | Stop reason: HOSPADM

## 2024-01-26 RX ORDER — HEPARIN 100 UNIT/ML
500 SYRINGE INTRAVENOUS AS NEEDED
Status: CANCELLED | OUTPATIENT
Start: 2024-01-26

## 2024-01-26 ASSESSMENT — PAIN SCALES - GENERAL: PAINLEVEL: 0-NO PAIN

## 2024-01-26 NOTE — PROGRESS NOTES
Platelets today are [phone]. Messaged Provider, per provider no Nplate today. Reviewed follow up with patient, verbalized understanding. Discharged home

## 2024-01-29 ENCOUNTER — OFFICE VISIT (OUTPATIENT)
Dept: CARDIOLOGY | Facility: CLINIC | Age: 74
End: 2024-01-29
Payer: MEDICARE

## 2024-01-29 VITALS
SYSTOLIC BLOOD PRESSURE: 150 MMHG | OXYGEN SATURATION: 98 % | WEIGHT: 209.56 LBS | DIASTOLIC BLOOD PRESSURE: 92 MMHG | BODY MASS INDEX: 27.77 KG/M2 | HEART RATE: 50 BPM | HEIGHT: 73 IN

## 2024-01-29 DIAGNOSIS — I48.92 ATRIAL FLUTTER, UNSPECIFIED TYPE (MULTI): Primary | ICD-10-CM

## 2024-01-29 LAB
ATRIAL RATE: 49 BPM
P AXIS: 56 DEGREES
P OFFSET: 194 MS
P ONSET: 128 MS
PR INTERVAL: 186 MS
Q ONSET: 221 MS
QRS COUNT: 9 BEATS
QRS DURATION: 88 MS
QT INTERVAL: 460 MS
QTC CALCULATION(BAZETT): 415 MS
QTC FREDERICIA: 430 MS
R AXIS: 6 DEGREES
T AXIS: 41 DEGREES
T OFFSET: 451 MS
VENTRICULAR RATE: 49 BPM

## 2024-01-29 PROCEDURE — 3077F SYST BP >= 140 MM HG: CPT | Performed by: INTERNAL MEDICINE

## 2024-01-29 PROCEDURE — 93005 ELECTROCARDIOGRAM TRACING: CPT | Performed by: INTERNAL MEDICINE

## 2024-01-29 PROCEDURE — 1160F RVW MEDS BY RX/DR IN RCRD: CPT | Performed by: INTERNAL MEDICINE

## 2024-01-29 PROCEDURE — 3080F DIAST BP >= 90 MM HG: CPT | Performed by: INTERNAL MEDICINE

## 2024-01-29 PROCEDURE — 99214 OFFICE O/P EST MOD 30 MIN: CPT | Performed by: INTERNAL MEDICINE

## 2024-01-29 PROCEDURE — 1126F AMNT PAIN NOTED NONE PRSNT: CPT | Performed by: INTERNAL MEDICINE

## 2024-01-29 PROCEDURE — 1036F TOBACCO NON-USER: CPT | Performed by: INTERNAL MEDICINE

## 2024-01-29 PROCEDURE — 1159F MED LIST DOCD IN RCRD: CPT | Performed by: INTERNAL MEDICINE

## 2024-01-29 PROCEDURE — 93010 ELECTROCARDIOGRAM REPORT: CPT | Performed by: INTERNAL MEDICINE

## 2024-01-29 ASSESSMENT — PAIN SCALES - GENERAL: PAINLEVEL: 0-NO PAIN

## 2024-01-29 ASSESSMENT — COLUMBIA-SUICIDE SEVERITY RATING SCALE - C-SSRS
6. HAVE YOU EVER DONE ANYTHING, STARTED TO DO ANYTHING, OR PREPARED TO DO ANYTHING TO END YOUR LIFE?: NO
2. HAVE YOU ACTUALLY HAD ANY THOUGHTS OF KILLING YOURSELF?: NO
1. IN THE PAST MONTH, HAVE YOU WISHED YOU WERE DEAD OR WISHED YOU COULD GO TO SLEEP AND NOT WAKE UP?: NO

## 2024-01-29 ASSESSMENT — ENCOUNTER SYMPTOMS
LOSS OF SENSATION IN FEET: 0
OCCASIONAL FEELINGS OF UNSTEADINESS: 0
DEPRESSION: 0

## 2024-01-29 NOTE — PROGRESS NOTES
Primary Care Physician: Bobby Pickard MD  Date of Visit: 01/29/2024 10:40 AM EST  [place] [facility] [place] [address]     Chief Complaint:   Chief Complaint   Patient presents with    Follow-up   73-year-old male with a history of Graves' disease, Graves' ophthalmopathy, atrial flutter in the setting of hyperthyroidism.  ITP.  Splenectomy 2018.    January TSH of 3.3, free T4 1.0  No structural heart disease date based on prior echo.  Rectal bleeding now being treated for colorectal cancer.  No surgery as of yet.    HPI / Summary:   Bobby Holland is a 73 y.o. male presents for followup.     Diagnosed with rectal cancer as previously noted.  He presents emergency room in April with anemia and hematochezia anoscopy showed a 7 cm large 6 none obstructing mass in the anal verge positive biopsy for moderately differentiated adeno.  Been taken off of anticoagulation.      Specialty Problems          Cardiology Problems    Atrial flutter (CMS/HCC)    Dyspnea on exertion    Essential hypertension    Idiopathic thrombocytopenic purpura (ITP) (CMS/HCC)    Paroxysmal atrial fibrillation (CMS/HCC)        Past Medical History:   Diagnosis Date    Immune thrombocytopenic purpura (CMS/HCC) 03/11/2014    Immune thrombocytopenic purpura          Past Surgical History:   Procedure Laterality Date    SPLENECTOMY, TOTAL  08/20/2018    Splenectomy          Social History:   reports that he has never smoked. He has never been exposed to tobacco smoke. He has never used smokeless tobacco. He reports current alcohol use of about 2.0 standard drinks of alcohol per week. He reports that he does not use drugs.      Allergies:  No Known Allergies    Outpatient Medications:  Current Outpatient Medications   Medication Instructions    propranolol (INDERAL) 60 mg, oral, 3 times daily       ROS     Physical Exam:  Vitals:    01/29/24 1101   BP: (!) 150/92   BP Location: Left arm   Patient Position: Sitting   Pulse: 50   SpO2: 98%   Weight: 95.1  "kg (209 lb 9 oz)   Height: 1.854 m (6' 1\")     Wt Readings from Last 5 Encounters:   01/29/24 95.1 kg (209 lb 9 oz)   01/26/24 96.2 kg (212 lb 1.3 oz)   01/19/24 94.8 kg (208 lb 15.9 oz)   01/17/24 94.5 kg (208 lb 5.4 oz)   01/16/24 96.7 kg (213 lb 3 oz)     Body mass index is 27.65 kg/m².     Well-developed male in no acute distress.  Regular rhythm with systolic murmur soft.  No diastolic murmur or gallop.  Clear lungs without crackles.  Soft abdomen without masses  No edema with intact lower extremity pulses  Last Labs:  CMP:  Recent Labs     01/26/24  1357 01/16/24  1620 01/12/24  1321 01/02/24  0913 12/27/23  0824    141 138 140 139   K 3.8 4.0 4.0 4.6 3.9    103 102 103 104   CO2 26 29 27 28 27   ANIONGAP 14 13 13 14 12   BUN 14 14 12 15 16   CREATININE 0.78 0.85 0.81 1.01 0.92   EGFR >90 >90 >90 78 87   GLUCOSE 95 98 94 114* 106*     Recent Labs     01/26/24  1357 01/16/24  1620 01/12/24  1321 01/02/24  0913 12/27/23  0824   ALBUMIN 3.5 3.6 3.5 3.8 3.6   ALKPHOS 92 108 88 102 95   ALT 15 21 17 19 19   AST 16 25 19 24 25   BILITOT 0.5 0.6 0.5 0.6 0.7     CBC:  Recent Labs     01/26/24  1405 01/16/24  1620 01/12/24  1321 01/02/24  0913 12/27/23  0824   WBC 6.4 5.5 5.6 5.2 5.6   HGB 10.9* 11.3* 10.3* 12.0* 11.1*   HCT 34.0* 35.9* 32.8* 38.0* 35.2*    227 256 128* 17*   MCV 86 87 86 87 87     COAG:   Recent Labs     09/12/23  1349 06/21/23  1030 06/22/18  1603   INR 0.9 1.0 1.0     HEME/ENDO:  Recent Labs     01/15/24  1419 11/14/23  1706 05/13/23  1111 03/18/23  1108 11/26/22  1021   FERRITIN  --   --  17*  --   --    IRONSAT  --   --  NOT CALC.  --   --    TSH 3.28 6.03*  --  2.02 6.31*      CARDIAC:   Recent Labs     01/20/20  1426 07/15/19  1359 04/16/19  1001 01/15/19  1555 07/10/18  1603 06/22/18  1603    212 221 214 198  --    BNP  --   --   --   --   --  136*   No results for input(s): \"CHOL\", \"LDLF\", \"HDL\", \"TRIG\" in the last 49109 hours.    Last Cardiology " Tests:  ECG:      Echo:  Echo Results:  No results found for this or any previous visit from the past 3650 days.       Cath:      Stress Test:  Stress Results:  No results found for this or any previous visit from the past 365 days.         Cardiac Imaging:  CT chest abdomen pelvis w IV contrast  Narrative: Interpreted By:  Jessenia Veliz,   STUDY:  CT CHEST ABDOMEN PELVIS W IV CONTRAST; ;  12/12/2023 12:19 pm      INDICATION:  Signs/Symptoms:Metastatic colon cancer.      COMPARISON:  05/31/2023      ACCESSION NUMBER(S):  [id number]      ORDERING CLINICIAN:  EMELYN ESCALANTE      TECHNIQUE:  Serial axial CT images obtained of the chest, abdomen, and pelvis  following intravenous administration of 75 mL of Omnipaque 350.  Images reformatted in the coronal and sagittal projection.      All CT examinations are performed with 1 or more of the following  dose reduction techniques: Automated exposure control, adjustment of  mA and/or kv according to patient's size, or use of iterative  reconstruction techniques.      FINDINGS:  CT chest:      Mediastinum demonstrates scattered subcentimeter lymph nodes in the  paratracheal and AP window locations stable from prior imaging. No  lymphadenopathy demonstrated. There is no hilar lymphadenopathy.  Esophagus is unremarkable      Heart and great vessels demonstrate ascending thoracic aorta to  measure 3.6 cm with findings as seen on prior imaging. Main pulmonary  artery measures 3.5 cm which is enlarged and can be seen with  pulmonary artery hypertension with findings as seen on prior imaging.      Lung parenchyma demonstrates a pulmonary nodule in the subpleural  medial basal segment of the left lower lobe measuring 9 x 7 mm and on  prior imaging measuring 8 x 5 mm (axial image 214). This is slightly  more conspicuous. There is a nodular lesion in the left upper lobe  measuring 10 mm and on prior imaging measured 8 mm (axial image 193).  Pulmonary nodule in the anterior segment  of the right upper lobe  intervally increased in size measuring 6 mm (axial image 83 and on  prior imaging measured 3 mm. An adjacent 6 mm pulmonary nodule has  intervally developed. Also, there is a nodule within the anterior  segment of the right upper lobe intervally developed measuring 4 mm  (axial image 130).      Visualized osseous structures demonstrate no compression deformity in  the thoracic spine. Mild multilevel endplate degenerative changes are  demonstrated.      CT abdomen:      Liver demonstrates ill-defined lesion within segment 7 of the right  lobe of liver measuring 4.5 cm appearing slightly less conspicuous  comparison to prior imaging. However, other lesions have intervally  increased in size. For example, in the left lobe of liver segment 4A  measuring 1.5 cm and on prior imaging measured 0.9 cm. Also, lesion  within segment 6 of the right lobe of liver inferiorly intervally  increased in size measures 1.4 cm and on prior imaging measured 1.1  cm. A few other lesions have intervally developed from the prior  exam. For example, in segment 6 measuring 1.1 cm.      Status post splenectomy.      Adrenal glands are unremarkable.      Gallbladder is unremarkable      Pancreas is unremarkable.      Right kidney demonstrates a cyst in the midpole anteriorly measuring  2.1 cm.      Left kidney demonstrates several exophytic cysts. There is a complex  cyst in the inferior pole left kidney laterally with a thin internal  septa with cysts measuring 3.0 cm with findings as seen on prior  imaging. Also, there is a lobulated septated cyst with thin internal  septation measuring 2.9 cm with findings as seen on prior imaging      Retroperitoneum demonstrates no lymphadenopathy. Scattered  subcentimeter lymph nodes are demonstrated. There is no ascites      Loops of large bowel demonstrates component of circumferential wall  thickening within the distal sigmoid colon with distribution  appearance as seen on  prior imaging measuring 9 mm. Correlate with  site of patient's primary colon carcinoma with findings stable from  prior imaging. Moderate stool filled colon demonstrated. There is a  mobile cecum. Small bowel loops are nondilated. Large right inguinal  hernia containing mesenteric fat and multiple loops of small bowel  with findings as seen on prior examination with hernia sac measuring  13.8 cm. Mesentery is unremarkable. Stomach is contracted.      CT pelvis:      Unopacified bladder is unremarkable. There is no pelvic  lymphadenopathy. No free fluid demonstrated.      Visualized osseous structures demonstrate no compression deformity in  the spine. Multilevel degenerative discogenic changes are  demonstrated. Mild multilevel posterior disc osteophyte complex  noted. There is mild narrowing thecal sac L4/5 multilevel endplate  degenerative changes within the visualized lumbar spine with  multilevel endplate sclerosis and Schmorl's node formation and loss  of disc space height.          Impression: 1. Interval increase in the size and number of pulmonary metastatic  nodules as described above.      2. Interval increase in the size and number of hepatic metastatic  disease.      3. Area of circumferential wall thickening of the distal sigmoid  colon corresponding to findings as seen on the prior CT likely  related to site of patient's primary colon carcinoma. No significant  surrounding lymphadenopathy with findings stable from prior imaging.          MACRO:  None      Signed by: Jessenia Veliz 12/12/2023 4:36 PM  Dictation workstation:   [id number]        Assessment/Plan       Bobby is follow-up with me for several years we have diagnosed him in 2018 with atrial arrhythmia in the setting of elevated thyroid function.  Is been on anticoagulation although clinically I do not think he has had any significant A-fib recurrence.  Now off anticoagulation to his gastrointestinal malignancy and possible bleeding  risk.  So far no clinical evidence of recurrence ECG today shows sinus bradycardia.  I think for now the risk benefit calculus is favors not placing him on anticoagulation.  If clinical atrial fibrillation recurs will consider referral for watchman left atrial appendage occluder.  I will see him back in 6 months.    Orders:  No orders of the defined types were placed in this encounter.     Followup Appts:  Future Appointments   Date Time Provider Department Center   1/31/2024  7:30 AM INF 02 M[facility]OR [id number] [facility]   2/2/2024  2:30 PM INF 00 ARNEX MENTOR [id number] [facility]   2/9/2024  1:30 PM INF 14 [facility] [id number] [facility]   2/13/2024  2:00 PM INF 12 MINOFF [id number] [facility]   2/13/2024  2:30 PM JOSE ALFREDO Flores [id number] [facility]   2/14/2024 12:30 PM INF 12 [facility] [id number] [facility]   2/16/2024  2:00 PM INF 14 M[facility] [id number] [facility]   2/23/2024  8:30 AM INF 00 ARNEX M[facility] [id number] [facility]   3/1/2024  9:30 AM INF 00 ARNEX M[facility]OR [id number] [facility]   4/15/2024 10:40 AM Juliano Clancy MD [id number] [facility]           ____________________________________________________________  Zafar Mckeon MD    Senior Attending Physician  [facility]  [facility]

## 2024-01-30 RX ORDER — EPINEPHRINE 0.3 MG/.3ML
0.3 INJECTION SUBCUTANEOUS EVERY 5 MIN PRN
Status: CANCELLED | OUTPATIENT
Start: 2024-01-31

## 2024-01-30 RX ORDER — PALONOSETRON 0.05 MG/ML
0.25 INJECTION, SOLUTION INTRAVENOUS ONCE
Status: CANCELLED | OUTPATIENT
Start: 2024-01-31

## 2024-01-30 RX ORDER — PROCHLORPERAZINE EDISYLATE 5 MG/ML
10 INJECTION INTRAMUSCULAR; INTRAVENOUS EVERY 6 HOURS PRN
Status: CANCELLED | OUTPATIENT
Start: 2024-01-31

## 2024-01-30 RX ORDER — PROCHLORPERAZINE MALEATE 10 MG
10 TABLET ORAL EVERY 6 HOURS PRN
Status: CANCELLED | OUTPATIENT
Start: 2024-01-31

## 2024-01-30 RX ORDER — FAMOTIDINE 10 MG/ML
20 INJECTION INTRAVENOUS ONCE AS NEEDED
Status: CANCELLED | OUTPATIENT
Start: 2024-01-31

## 2024-01-30 RX ORDER — ALBUTEROL SULFATE 0.83 MG/ML
3 SOLUTION RESPIRATORY (INHALATION) AS NEEDED
Status: CANCELLED | OUTPATIENT
Start: 2024-01-31

## 2024-01-30 RX ORDER — DIPHENHYDRAMINE HYDROCHLORIDE 50 MG/ML
50 INJECTION INTRAMUSCULAR; INTRAVENOUS AS NEEDED
Status: CANCELLED | OUTPATIENT
Start: 2024-01-31

## 2024-01-30 RX ORDER — ATROPINE SULFATE 0.4 MG/ML
0.4 INJECTION, SOLUTION ENDOTRACHEAL; INTRAMEDULLARY; INTRAMUSCULAR; INTRAVENOUS; SUBCUTANEOUS
Status: CANCELLED | OUTPATIENT
Start: 2024-01-31

## 2024-01-31 ENCOUNTER — INFUSION (OUTPATIENT)
Dept: HEMATOLOGY/ONCOLOGY | Facility: CLINIC | Age: 74
End: 2024-01-31
Payer: MEDICARE

## 2024-01-31 VITALS
WEIGHT: 208.34 LBS | SYSTOLIC BLOOD PRESSURE: 123 MMHG | BODY MASS INDEX: 27.49 KG/M2 | HEART RATE: 50 BPM | TEMPERATURE: 97.2 F | OXYGEN SATURATION: 95 % | RESPIRATION RATE: 18 BRPM | DIASTOLIC BLOOD PRESSURE: 70 MMHG

## 2024-01-31 DIAGNOSIS — C20 MALIGNANT NEOPLASM OF RECTUM (MULTI): ICD-10-CM

## 2024-01-31 LAB
ALBUMIN SERPL BCP-MCNC: 3.5 G/DL (ref 3.4–5)
ALP SERPL-CCNC: 95 U/L (ref 33–136)
ALT SERPL W P-5'-P-CCNC: 17 U/L (ref 10–52)
ANION GAP SERPL CALC-SCNC: 14 MMOL/L (ref 10–20)
APPEARANCE UR: CLEAR
AST SERPL W P-5'-P-CCNC: 22 U/L (ref 9–39)
BASOPHILS # BLD AUTO: 0.1 X10*3/UL (ref 0–0.1)
BASOPHILS NFR BLD AUTO: 1.7 %
BILIRUB SERPL-MCNC: 0.6 MG/DL (ref 0–1.2)
BILIRUB UR STRIP.AUTO-MCNC: NEGATIVE MG/DL
BUN SERPL-MCNC: 15 MG/DL (ref 6–23)
CALCIUM SERPL-MCNC: 9.3 MG/DL (ref 8.6–10.3)
CHLORIDE SERPL-SCNC: 103 MMOL/L (ref 98–107)
CO2 SERPL-SCNC: 26 MMOL/L (ref 21–32)
COLOR UR: YELLOW
CREAT SERPL-MCNC: 0.82 MG/DL (ref 0.5–1.3)
EGFRCR SERPLBLD CKD-EPI 2021: >90 ML/MIN/1.73M*2
EOSINOPHIL # BLD AUTO: 0.08 X10*3/UL (ref 0–0.4)
EOSINOPHIL NFR BLD AUTO: 1.4 %
ERYTHROCYTE [DISTWIDTH] IN BLOOD BY AUTOMATED COUNT: 16.1 % (ref 11.5–14.5)
GLUCOSE SERPL-MCNC: 115 MG/DL (ref 74–99)
GLUCOSE UR STRIP.AUTO-MCNC: NEGATIVE MG/DL
HCT VFR BLD AUTO: 35.2 % (ref 41–52)
HGB BLD-MCNC: 11.2 G/DL (ref 13.5–17.5)
IMM GRANULOCYTES # BLD AUTO: 0.02 X10*3/UL (ref 0–0.5)
IMM GRANULOCYTES NFR BLD AUTO: 0.3 % (ref 0–0.9)
KETONES UR STRIP.AUTO-MCNC: NEGATIVE MG/DL
LEUKOCYTE ESTERASE UR QL STRIP.AUTO: NEGATIVE
LYMPHOCYTES # BLD AUTO: 2.51 X10*3/UL (ref 0.8–3)
LYMPHOCYTES NFR BLD AUTO: 42.5 %
MCH RBC QN AUTO: 27.1 PG (ref 26–34)
MCHC RBC AUTO-ENTMCNC: 31.8 G/DL (ref 32–36)
MCV RBC AUTO: 85 FL (ref 80–100)
MONOCYTES # BLD AUTO: 1.04 X10*3/UL (ref 0.05–0.8)
MONOCYTES NFR BLD AUTO: 17.6 %
NEUTROPHILS # BLD AUTO: 2.16 X10*3/UL (ref 1.6–5.5)
NEUTROPHILS NFR BLD AUTO: 36.5 %
NITRITE UR QL STRIP.AUTO: NEGATIVE
NRBC BLD-RTO: 0 /100 WBCS (ref 0–0)
PH UR STRIP.AUTO: 6 [PH]
PLATELET # BLD AUTO: 413 X10*3/UL (ref 150–450)
POTASSIUM SERPL-SCNC: 4 MMOL/L (ref 3.5–5.3)
PROT SERPL-MCNC: 7.1 G/DL (ref 6.4–8.2)
PROT UR STRIP.AUTO-MCNC: NEGATIVE MG/DL
RBC # BLD AUTO: 4.14 X10*6/UL (ref 4.5–5.9)
RBC # UR STRIP.AUTO: NEGATIVE /UL
SODIUM SERPL-SCNC: 139 MMOL/L (ref 136–145)
SP GR UR STRIP.AUTO: 1.02
UROBILINOGEN UR STRIP.AUTO-MCNC: <2 MG/DL
WBC # BLD AUTO: 5.9 X10*3/UL (ref 4.4–11.3)

## 2024-01-31 PROCEDURE — 96413 CHEMO IV INFUSION 1 HR: CPT

## 2024-01-31 PROCEDURE — 80053 COMPREHEN METABOLIC PANEL: CPT | Performed by: INTERNAL MEDICINE

## 2024-01-31 PROCEDURE — 85025 COMPLETE CBC W/AUTO DIFF WBC: CPT | Performed by: INTERNAL MEDICINE

## 2024-01-31 PROCEDURE — 96411 CHEMO IV PUSH ADDL DRUG: CPT

## 2024-01-31 PROCEDURE — 81003 URINALYSIS AUTO W/O SCOPE: CPT | Performed by: INTERNAL MEDICINE

## 2024-01-31 PROCEDURE — 2500000004 HC RX 250 GENERAL PHARMACY W/ HCPCS (ALT 636 FOR OP/ED): Performed by: INTERNAL MEDICINE

## 2024-01-31 PROCEDURE — 96375 TX/PRO/DX INJ NEW DRUG ADDON: CPT | Mod: INF

## 2024-01-31 RX ORDER — ATROPINE SULFATE 0.4 MG/ML
0.4 INJECTION, SOLUTION ENDOTRACHEAL; INTRAMEDULLARY; INTRAMUSCULAR; INTRAVENOUS; SUBCUTANEOUS
Status: DISCONTINUED | OUTPATIENT
Start: 2024-01-31 | End: 2024-01-31 | Stop reason: HOSPADM

## 2024-01-31 RX ORDER — DIPHENHYDRAMINE HYDROCHLORIDE 50 MG/ML
50 INJECTION INTRAMUSCULAR; INTRAVENOUS AS NEEDED
Status: DISCONTINUED | OUTPATIENT
Start: 2024-01-31 | End: 2024-01-31 | Stop reason: HOSPADM

## 2024-01-31 RX ORDER — EPINEPHRINE 0.3 MG/.3ML
0.3 INJECTION SUBCUTANEOUS EVERY 5 MIN PRN
Status: DISCONTINUED | OUTPATIENT
Start: 2024-01-31 | End: 2024-01-31 | Stop reason: HOSPADM

## 2024-01-31 RX ORDER — ALBUTEROL SULFATE 0.83 MG/ML
3 SOLUTION RESPIRATORY (INHALATION) AS NEEDED
Status: DISCONTINUED | OUTPATIENT
Start: 2024-01-31 | End: 2024-01-31 | Stop reason: HOSPADM

## 2024-01-31 RX ORDER — FAMOTIDINE 10 MG/ML
20 INJECTION INTRAVENOUS ONCE AS NEEDED
Status: DISCONTINUED | OUTPATIENT
Start: 2024-01-31 | End: 2024-01-31 | Stop reason: HOSPADM

## 2024-01-31 RX ORDER — PALONOSETRON 0.05 MG/ML
0.25 INJECTION, SOLUTION INTRAVENOUS ONCE
Status: COMPLETED | OUTPATIENT
Start: 2024-01-31 | End: 2024-01-31

## 2024-01-31 RX ORDER — PROCHLORPERAZINE MALEATE 10 MG
10 TABLET ORAL EVERY 6 HOURS PRN
Status: DISCONTINUED | OUTPATIENT
Start: 2024-01-31 | End: 2024-01-31 | Stop reason: HOSPADM

## 2024-01-31 RX ORDER — PROCHLORPERAZINE EDISYLATE 5 MG/ML
10 INJECTION INTRAMUSCULAR; INTRAVENOUS EVERY 6 HOURS PRN
Status: DISCONTINUED | OUTPATIENT
Start: 2024-01-31 | End: 2024-01-31 | Stop reason: HOSPADM

## 2024-01-31 RX ADMIN — PALONOSETRON HYDROCHLORIDE 250 MCG: 0.25 INJECTION INTRAVENOUS at 08:30

## 2024-01-31 RX ADMIN — DEXAMETHASONE SODIUM PHOSPHATE 12 MG: 10 INJECTION, SOLUTION INTRAMUSCULAR; INTRAVENOUS at 08:32

## 2024-01-31 RX ADMIN — ATROPINE SULFATE 0.4 MG: 0.4 INJECTION, SOLUTION INTRAVENOUS at 09:06

## 2024-01-31 RX ADMIN — IRINOTECAN HYDROCHLORIDE 400 MG: 20 INJECTION, SOLUTION INTRAVENOUS at 09:07

## 2024-01-31 RX ADMIN — BEVACIZUMAB-AWWB 500 MG: 400 INJECTION, SOLUTION INTRAVENOUS at 08:49

## 2024-01-31 RX ADMIN — FLUOROURACIL 5350 MG: 50 INJECTION, SOLUTION INTRAVENOUS at 10:41

## 2024-01-31 NOTE — PROGRESS NOTES
0735 - Per Dr. Pickard it is ok to treat patient based on the 1+ urine protein from 1/2/24. Urine collected and sent to lab today - will not result same day though.

## 2024-02-02 ENCOUNTER — INFUSION (OUTPATIENT)
Dept: HEMATOLOGY/ONCOLOGY | Facility: CLINIC | Age: 74
End: 2024-02-02
Payer: MEDICARE

## 2024-02-02 VITALS
RESPIRATION RATE: 18 BRPM | TEMPERATURE: 96.4 F | DIASTOLIC BLOOD PRESSURE: 75 MMHG | BODY MASS INDEX: 27.43 KG/M2 | HEART RATE: 49 BPM | OXYGEN SATURATION: 100 % | SYSTOLIC BLOOD PRESSURE: 123 MMHG | WEIGHT: 207.89 LBS

## 2024-02-02 DIAGNOSIS — D69.3 IDIOPATHIC THROMBOCYTOPENIC PURPURA (ITP) (MULTI): ICD-10-CM

## 2024-02-02 PROCEDURE — 96523 IRRIG DRUG DELIVERY DEVICE: CPT

## 2024-02-02 ASSESSMENT — PAIN SCALES - GENERAL: PAINLEVEL: 0-NO PAIN

## 2024-02-09 ENCOUNTER — INFUSION (OUTPATIENT)
Dept: HEMATOLOGY/ONCOLOGY | Facility: CLINIC | Age: 74
End: 2024-02-09
Payer: MEDICARE

## 2024-02-09 VITALS
TEMPERATURE: 97 F | SYSTOLIC BLOOD PRESSURE: 158 MMHG | DIASTOLIC BLOOD PRESSURE: 97 MMHG | RESPIRATION RATE: 18 BRPM | HEART RATE: 65 BPM | WEIGHT: 208.78 LBS | BODY MASS INDEX: 27.54 KG/M2

## 2024-02-09 DIAGNOSIS — C20 MALIGNANT NEOPLASM OF RECTUM (MULTI): ICD-10-CM

## 2024-02-09 DIAGNOSIS — D69.3 IDIOPATHIC THROMBOCYTOPENIC PURPURA (ITP) (MULTI): ICD-10-CM

## 2024-02-09 LAB
ALBUMIN SERPL BCP-MCNC: 3.7 G/DL (ref 3.4–5)
ALP SERPL-CCNC: 85 U/L (ref 33–136)
ALT SERPL W P-5'-P-CCNC: 17 U/L (ref 10–52)
ANION GAP SERPL CALC-SCNC: 12 MMOL/L (ref 10–20)
AST SERPL W P-5'-P-CCNC: 19 U/L (ref 9–39)
BASOPHILS # BLD AUTO: 0.04 X10*3/UL (ref 0–0.1)
BASOPHILS NFR BLD AUTO: 0.7 %
BILIRUB SERPL-MCNC: 0.5 MG/DL (ref 0–1.2)
BUN SERPL-MCNC: 14 MG/DL (ref 6–23)
CALCIUM SERPL-MCNC: 9.5 MG/DL (ref 8.6–10.3)
CHLORIDE SERPL-SCNC: 101 MMOL/L (ref 98–107)
CO2 SERPL-SCNC: 28 MMOL/L (ref 21–32)
CREAT SERPL-MCNC: 0.8 MG/DL (ref 0.5–1.3)
EGFRCR SERPLBLD CKD-EPI 2021: >90 ML/MIN/1.73M*2
EOSINOPHIL # BLD AUTO: 0.11 X10*3/UL (ref 0–0.4)
EOSINOPHIL NFR BLD AUTO: 1.9 %
ERYTHROCYTE [DISTWIDTH] IN BLOOD BY AUTOMATED COUNT: 17.1 % (ref 11.5–14.5)
GLUCOSE SERPL-MCNC: 100 MG/DL (ref 74–99)
HCT VFR BLD AUTO: 33.9 % (ref 41–52)
HGB BLD-MCNC: 10.8 G/DL (ref 13.5–17.5)
IMM GRANULOCYTES # BLD AUTO: 0.01 X10*3/UL (ref 0–0.5)
IMM GRANULOCYTES NFR BLD AUTO: 0.2 % (ref 0–0.9)
LYMPHOCYTES # BLD AUTO: 2.54 X10*3/UL (ref 0.8–3)
LYMPHOCYTES NFR BLD AUTO: 44.2 %
MCH RBC QN AUTO: 26.8 PG (ref 26–34)
MCHC RBC AUTO-ENTMCNC: 31.9 G/DL (ref 32–36)
MCV RBC AUTO: 84 FL (ref 80–100)
MONOCYTES # BLD AUTO: 0.9 X10*3/UL (ref 0.05–0.8)
MONOCYTES NFR BLD AUTO: 15.7 %
NEUTROPHILS # BLD AUTO: 2.15 X10*3/UL (ref 1.6–5.5)
NEUTROPHILS NFR BLD AUTO: 37.3 %
NRBC BLD-RTO: 0.3 /100 WBCS (ref 0–0)
PLATELET # BLD AUTO: 164 X10*3/UL (ref 150–450)
POTASSIUM SERPL-SCNC: 3.9 MMOL/L (ref 3.5–5.3)
PROT SERPL-MCNC: 7.2 G/DL (ref 6.4–8.2)
RBC # BLD AUTO: 4.03 X10*6/UL (ref 4.5–5.9)
SODIUM SERPL-SCNC: 137 MMOL/L (ref 136–145)
WBC # BLD AUTO: 5.8 X10*3/UL (ref 4.4–11.3)

## 2024-02-09 PROCEDURE — 80053 COMPREHEN METABOLIC PANEL: CPT | Performed by: INTERNAL MEDICINE

## 2024-02-09 PROCEDURE — 2500000004 HC RX 250 GENERAL PHARMACY W/ HCPCS (ALT 636 FOR OP/ED): Performed by: INTERNAL MEDICINE

## 2024-02-09 PROCEDURE — 96372 THER/PROPH/DIAG INJ SC/IM: CPT

## 2024-02-09 PROCEDURE — 96372 THER/PROPH/DIAG INJ SC/IM: CPT | Performed by: INTERNAL MEDICINE

## 2024-02-09 PROCEDURE — 85025 COMPLETE CBC W/AUTO DIFF WBC: CPT | Performed by: INTERNAL MEDICINE

## 2024-02-09 RX ORDER — EPINEPHRINE 0.3 MG/.3ML
0.3 INJECTION SUBCUTANEOUS EVERY 5 MIN PRN
Status: DISCONTINUED | OUTPATIENT
Start: 2024-02-09 | End: 2024-02-09 | Stop reason: HOSPADM

## 2024-02-09 RX ORDER — HEPARIN 100 UNIT/ML
500 SYRINGE INTRAVENOUS AS NEEDED
Status: DISCONTINUED | OUTPATIENT
Start: 2024-02-09 | End: 2024-02-09 | Stop reason: HOSPADM

## 2024-02-09 RX ORDER — ALBUTEROL SULFATE 0.83 MG/ML
3 SOLUTION RESPIRATORY (INHALATION) AS NEEDED
Status: DISCONTINUED | OUTPATIENT
Start: 2024-02-09 | End: 2024-02-09 | Stop reason: HOSPADM

## 2024-02-09 RX ORDER — HEPARIN 100 UNIT/ML
500 SYRINGE INTRAVENOUS AS NEEDED
Status: CANCELLED | OUTPATIENT
Start: 2024-02-09

## 2024-02-09 RX ORDER — FAMOTIDINE 10 MG/ML
20 INJECTION INTRAVENOUS ONCE AS NEEDED
Status: CANCELLED | OUTPATIENT
Start: 2024-02-16

## 2024-02-09 RX ORDER — DIPHENHYDRAMINE HYDROCHLORIDE 50 MG/ML
50 INJECTION INTRAMUSCULAR; INTRAVENOUS AS NEEDED
Status: CANCELLED | OUTPATIENT
Start: 2024-02-16

## 2024-02-09 RX ORDER — HEPARIN SODIUM,PORCINE/PF 10 UNIT/ML
50 SYRINGE (ML) INTRAVENOUS AS NEEDED
Status: CANCELLED | OUTPATIENT
Start: 2024-02-09

## 2024-02-09 RX ORDER — EPINEPHRINE 0.3 MG/.3ML
0.3 INJECTION SUBCUTANEOUS EVERY 5 MIN PRN
Status: CANCELLED | OUTPATIENT
Start: 2024-02-16

## 2024-02-09 RX ORDER — ALBUTEROL SULFATE 0.83 MG/ML
3 SOLUTION RESPIRATORY (INHALATION) AS NEEDED
Status: CANCELLED | OUTPATIENT
Start: 2024-02-16

## 2024-02-09 RX ORDER — DIPHENHYDRAMINE HYDROCHLORIDE 50 MG/ML
50 INJECTION INTRAMUSCULAR; INTRAVENOUS AS NEEDED
Status: DISCONTINUED | OUTPATIENT
Start: 2024-02-09 | End: 2024-02-09 | Stop reason: HOSPADM

## 2024-02-09 RX ORDER — FAMOTIDINE 10 MG/ML
20 INJECTION INTRAVENOUS ONCE AS NEEDED
Status: DISCONTINUED | OUTPATIENT
Start: 2024-02-09 | End: 2024-02-09 | Stop reason: HOSPADM

## 2024-02-09 RX ORDER — HEPARIN SODIUM,PORCINE/PF 10 UNIT/ML
50 SYRINGE (ML) INTRAVENOUS AS NEEDED
Status: DISCONTINUED | OUTPATIENT
Start: 2024-02-09 | End: 2024-02-09 | Stop reason: HOSPADM

## 2024-02-09 RX ADMIN — SODIUM CHLORIDE, PRESERVATIVE FREE 500 UNITS: 5 INJECTION INTRAVENOUS at 14:00

## 2024-02-09 RX ADMIN — ROMIPLOSTIM 95 MCG: 125 INJECTION, POWDER, LYOPHILIZED, FOR SOLUTION SUBCUTANEOUS at 15:18

## 2024-02-09 ASSESSMENT — PAIN SCALES - GENERAL: PAINLEVEL: 0-NO PAIN

## 2024-02-09 NOTE — PROGRESS NOTES
Notified MD Pickard of patients platelet count of 164 today. On 1/31 it was 413, per order dose is to be reduced by 1mcg/kg. MD Pickard reduced dose to 1mcg/kg-injection given.

## 2024-02-13 ENCOUNTER — INFUSION (OUTPATIENT)
Dept: HEMATOLOGY/ONCOLOGY | Facility: CLINIC | Age: 74
End: 2024-02-13
Payer: MEDICARE

## 2024-02-13 ENCOUNTER — OFFICE VISIT (OUTPATIENT)
Dept: HEMATOLOGY/ONCOLOGY | Facility: CLINIC | Age: 74
End: 2024-02-13
Payer: MEDICARE

## 2024-02-13 VITALS
OXYGEN SATURATION: 97 % | HEART RATE: 58 BPM | TEMPERATURE: 97.9 F | BODY MASS INDEX: 27.34 KG/M2 | RESPIRATION RATE: 18 BRPM | WEIGHT: 207.23 LBS | SYSTOLIC BLOOD PRESSURE: 133 MMHG | DIASTOLIC BLOOD PRESSURE: 85 MMHG

## 2024-02-13 VITALS — RESPIRATION RATE: 18 BRPM

## 2024-02-13 DIAGNOSIS — C20 MALIGNANT NEOPLASM OF RECTUM (MULTI): ICD-10-CM

## 2024-02-13 DIAGNOSIS — C19 COLORECTAL CANCER (MULTI): Primary | ICD-10-CM

## 2024-02-13 DIAGNOSIS — D69.3 IDIOPATHIC THROMBOCYTOPENIC PURPURA (ITP) (MULTI): ICD-10-CM

## 2024-02-13 LAB
ALBUMIN SERPL BCP-MCNC: 3.6 G/DL (ref 3.4–5)
ALP SERPL-CCNC: 97 U/L (ref 33–136)
ALT SERPL W P-5'-P-CCNC: 19 U/L (ref 10–52)
ANION GAP SERPL CALC-SCNC: 15 MMOL/L (ref 10–20)
APPEARANCE UR: CLEAR
AST SERPL W P-5'-P-CCNC: 22 U/L (ref 9–39)
BASOPHILS # BLD AUTO: 0.05 X10*3/UL (ref 0–0.1)
BASOPHILS NFR BLD AUTO: 0.8 %
BILIRUB SERPL-MCNC: 0.5 MG/DL (ref 0–1.2)
BILIRUB UR STRIP.AUTO-MCNC: NEGATIVE MG/DL
BUN SERPL-MCNC: 15 MG/DL (ref 6–23)
CALCIUM SERPL-MCNC: 10.2 MG/DL (ref 8.6–10.6)
CHLORIDE SERPL-SCNC: 101 MMOL/L (ref 98–107)
CO2 SERPL-SCNC: 27 MMOL/L (ref 21–32)
COLOR UR: YELLOW
CREAT SERPL-MCNC: 0.92 MG/DL (ref 0.5–1.3)
EGFRCR SERPLBLD CKD-EPI 2021: 87 ML/MIN/1.73M*2
EOSINOPHIL # BLD AUTO: 0.18 X10*3/UL (ref 0–0.4)
EOSINOPHIL NFR BLD AUTO: 2.7 %
ERYTHROCYTE [DISTWIDTH] IN BLOOD BY AUTOMATED COUNT: 16.9 % (ref 11.5–14.5)
GLUCOSE SERPL-MCNC: 88 MG/DL (ref 74–99)
GLUCOSE UR STRIP.AUTO-MCNC: NEGATIVE MG/DL
HCT VFR BLD AUTO: 35 % (ref 41–52)
HGB BLD-MCNC: 11.1 G/DL (ref 13.5–17.5)
IMM GRANULOCYTES # BLD AUTO: 0 X10*3/UL (ref 0–0.5)
IMM GRANULOCYTES NFR BLD AUTO: 0 % (ref 0–0.9)
KETONES UR STRIP.AUTO-MCNC: NEGATIVE MG/DL
LEUKOCYTE ESTERASE UR QL STRIP.AUTO: NEGATIVE
LYMPHOCYTES # BLD AUTO: 2.66 X10*3/UL (ref 0.8–3)
LYMPHOCYTES NFR BLD AUTO: 40.6 %
MCH RBC QN AUTO: 27.4 PG (ref 26–34)
MCHC RBC AUTO-ENTMCNC: 31.7 G/DL (ref 32–36)
MCV RBC AUTO: 86 FL (ref 80–100)
MONOCYTES # BLD AUTO: 1.06 X10*3/UL (ref 0.05–0.8)
MONOCYTES NFR BLD AUTO: 16.2 %
NEUTROPHILS # BLD AUTO: 2.6 X10*3/UL (ref 1.6–5.5)
NEUTROPHILS NFR BLD AUTO: 39.7 %
NITRITE UR QL STRIP.AUTO: NEGATIVE
NRBC BLD-RTO: ABNORMAL /100{WBCS}
PH UR STRIP.AUTO: 5 [PH]
PLATELET # BLD AUTO: 157 X10*3/UL (ref 150–450)
POTASSIUM SERPL-SCNC: 3.9 MMOL/L (ref 3.5–5.3)
PROT SERPL-MCNC: 7 G/DL (ref 6.4–8.2)
PROT UR STRIP.AUTO-MCNC: NEGATIVE MG/DL
RBC # BLD AUTO: 4.05 X10*6/UL (ref 4.5–5.9)
RBC # UR STRIP.AUTO: NEGATIVE /UL
SODIUM SERPL-SCNC: 139 MMOL/L (ref 136–145)
SP GR UR STRIP.AUTO: 1.02
UROBILINOGEN UR STRIP.AUTO-MCNC: 0.2 MG/DL
WBC # BLD AUTO: 6.6 X10*3/UL (ref 4.4–11.3)

## 2024-02-13 PROCEDURE — 85025 COMPLETE CBC W/AUTO DIFF WBC: CPT | Performed by: NURSE PRACTITIONER

## 2024-02-13 PROCEDURE — 81003 URINALYSIS AUTO W/O SCOPE: CPT | Performed by: NURSE PRACTITIONER

## 2024-02-13 PROCEDURE — 36591 DRAW BLOOD OFF VENOUS DEVICE: CPT

## 2024-02-13 PROCEDURE — 1159F MED LIST DOCD IN RCRD: CPT | Performed by: NURSE PRACTITIONER

## 2024-02-13 PROCEDURE — 99214 OFFICE O/P EST MOD 30 MIN: CPT | Performed by: NURSE PRACTITIONER

## 2024-02-13 PROCEDURE — 1036F TOBACCO NON-USER: CPT | Performed by: NURSE PRACTITIONER

## 2024-02-13 PROCEDURE — 80053 COMPREHEN METABOLIC PANEL: CPT | Performed by: INTERNAL MEDICINE

## 2024-02-13 PROCEDURE — 3075F SYST BP GE 130 - 139MM HG: CPT | Performed by: NURSE PRACTITIONER

## 2024-02-13 PROCEDURE — 1160F RVW MEDS BY RX/DR IN RCRD: CPT | Performed by: NURSE PRACTITIONER

## 2024-02-13 PROCEDURE — 3079F DIAST BP 80-89 MM HG: CPT | Performed by: NURSE PRACTITIONER

## 2024-02-13 PROCEDURE — 1126F AMNT PAIN NOTED NONE PRSNT: CPT | Performed by: NURSE PRACTITIONER

## 2024-02-13 ASSESSMENT — ENCOUNTER SYMPTOMS
DEPRESSION: 0
LOSS OF SENSATION IN FEET: 0
OCCASIONAL FEELINGS OF UNSTEADINESS: 0

## 2024-02-13 ASSESSMENT — PAIN SCALES - GENERAL: PAINLEVEL: 0-NO PAIN

## 2024-02-13 NOTE — PROGRESS NOTES
Patient ID: Bobby Holland is a 73 y.o. male from [place], [place].     Referring Physician: Bobby Pickard MD  [address] [place] [address]    Primary Care Provider: Bobby Pickard MD    Cancer History:   Treatment Synopsis:    DIAGNOSIS  Rectal Cancer 5/2023     STAGING  [id number]     CURRENT SITES OF DISEASE  Rectum, liver and lung     MOLECULAR GENOMICS    Test Name: H[employer]Live xT (XT.V4)  Laboratory Name: [facility]  Specimen Source: Colon, rectosigmoid  Collection Date: 15-May-2023  Cancer Type: Invasive adenocarcinoma  Report Id: [id number]    Molecular Findings:  * Gene: KRAS, Variant: Missense variant (exon 2) - GOF, Potentially Actionable, p.G12V (Allele Frequency - 16.6%)  * Gene: APC, Variant: Frameshift - LOF, Biologically Relevant, p.Q4787wt (Allele Frequency - 26.6%)  * Gene: APC, Variant: Stop gain - LOF, Biologically Relevant, p.R213*, Nonsense (Allele Frequency - 20.2%)  * Gene: FBXW7, Variant: Missense variant - LOF, Biologically Relevant, p.R505C (Allele Frequency - 23.3%)  * Gene: CCND1, Variant: Amplification, Copy number gain, Biologically Relevant  * Gene: FGF3, Variant: Amplification, Copy number gain, Biologically Relevant  * Gene: FGF4, Variant: Amplification, Copy number gain, Biologically Relevant  * Biomarker: Microsatellite Instability, Status: stable  * Biomarker: Tumor Mutation [place] (5.8 Muts/Mb, Percentile: 63.0)  * Disease associated genes/variants with no reportable findings:     * BRAF    * NRAS  * 7 variants of unknown significance    ** End of copied information **     SERUM TUMOR MARKER  CEA 4.4 - June 2023     CURRENT THERAPY  Dec 2023 progression- changed to FOLFIRI      PRIOR THERAPY  July 2023 - Dec 2023 FOLFOX      DEVICES  mediport     CURRENT PROBLEM LIST   ITP  - spleenectomy 2012   Graves ds  Metastatic colorectal cancer        Treatment History:    73 y.o.  male presenting to the emergency room with hematochezia in early April hemoglobin low taken off  Eliquis.  Bleeding initially resolved.  Anoscopy in May  2023 showed a large circumferential nonobstructing mass 7 cm in size at 9 cm from the anal verge.  Biopsy showed moderately differentiated adenocarcinoma.  MMR was proficient.  CEA was 4.7.  Imaging with Dr. Mccrary which showed lung and liver metastases.   PET scan confirmed FDG avidity of these lesions.          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  Social History:  Bobby reports that he has never smoked. He has never been exposed to tobacco smoke. He has never used smokeless tobacco. He reports current alcohol use of about 2.0 standard drinks of alcohol per week. He reports that he does not use drugs.  Family History:  No family history on file.  Family Oncology History:  Cancer-related family history is not on file.        SUBJECTIVE:    History of Present Illness:  Bobby Holland is a 73 y.o. male who was referred by Bobby Pickard MD and presents with Follow-up (Fuv )    S/p 3 cycles FOLFIRI - here for assmt prior to C4   Overall feels well - denies any cough, sob, cp   No fevers or chills.   No diarrhea   Energy level is pretty good   Denies pain       OBJECTIVE:    VS / Pain:  /85   Pulse 58   Temp 36.6 °C (97.9 °F)   Resp 18   Wt 94 kg (207 lb 3.7 oz)   SpO2 97%   BMI 27.34 kg/m²   BSA: 2.2 meters squared   Pain Scale: 0    Daily Weight  02/13/24 : 94 kg (207 lb 3.7 oz)  02/09/24 : 94.7 kg (208 lb 12.4 oz)  02/02/24 : 94.3 kg (207 lb 14.3 oz)  01/31/24 : 94.5 kg (208 lb 5.4 oz)  01/29/24 : 95.1 kg (209 lb 9 oz)  01/26/24 : 96.2 kg (212 lb 1.3 oz)  01/19/24 : 94.8 kg (208 lb 15.9 oz)      Physical Exam  Constitutional:       Appearance: He is normal weight.   HENT:      Nose: Nose normal.      Mouth/Throat:      Mouth: Mucous membranes are moist.   Eyes:       Pupils: Pupils are equal, round, and reactive to light.   Cardiovascular:      Rate and Rhythm: Normal rate.      Pulses: Normal pulses.   Abdominal:      General: Abdomen is flat.   Musculoskeletal:         General: Normal range of motion.   Skin:     General: Skin is warm.   Neurological:      General: No focal deficit present.      Mental Status: He is alert.   Psychiatric:         Mood and Affect: Mood normal.         Judgment: Judgment normal.         Performance Status:   ECOG 1    Diagnostic Results         WBC   Date/Time Value Ref Range Status   02/09/2024 02:00 PM 5.8 4.4 - 11.3 x10*3/uL Final   01/31/2024 07:57 AM 5.9 4.4 - 11.3 x10*3/uL Final   01/26/2024 02:05 PM 6.4 4.4 - 11.3 x10*3/uL Final     Hemoglobin   Date Value Ref Range Status   02/09/2024 10.8 (L) 13.5 - 17.5 g/dL Final   01/31/2024 11.2 (L) 13.5 - 17.5 g/dL Final   01/26/2024 10.9 (L) 13.5 - 17.5 g/dL Final     MCV   Date/Time Value Ref Range Status   02/09/2024 02:00 PM 84 80 - 100 fL Final   01/31/2024 07:57 AM 85 80 - 100 fL Final   01/26/2024 02:05 PM 86 80 - 100 fL Final     Platelets   Date/Time Value Ref Range Status   02/09/2024 02:00  150 - 450 x10*3/uL Final   01/31/2024 07:57  150 - 450 x10*3/uL Final   01/26/2024 02:05  150 - 450 x10*3/uL Final     Neutrophils Absolute   Date/Time Value Ref Range Status   02/09/2024 02:00 PM 2.15 1.60 - 5.50 x10*3/uL Final     Comment:     Percent differential counts (%) should be interpreted in the context of the absolute cell counts (cells/uL).   01/31/2024 07:57 AM 2.16 1.60 - 5.50 x10*3/uL Final     Comment:     Percent differential counts (%) should be interpreted in the context of the absolute cell counts (cells/uL).   01/26/2024 02:05 PM 2.04 1.60 - 5.50 x10*3/uL Final     Comment:     Percent differential counts (%) should be interpreted in the context of the absolute cell counts (cells/uL).     Bilirubin, Total   Date/Time Value Ref Range Status   02/09/2024 02:00 PM  "0.5 0.0 - 1.2 mg/dL Final   01/31/2024 07:57 AM 0.6 0.0 - 1.2 mg/dL Final   01/26/2024 01:57 PM 0.5 0.0 - 1.2 mg/dL Final     AST   Date/Time Value Ref Range Status   02/09/2024 02:00 PM 19 9 - 39 U/L Final   01/31/2024 07:57 AM 22 9 - 39 U/L Final   01/26/2024 01:57 PM 16 9 - 39 U/L Final     ALT   Date/Time Value Ref Range Status   02/09/2024 02:00 PM 17 10 - 52 U/L Final     Comment:     Patients treated with Sulfasalazine may generate falsely decreased results for ALT.   01/31/2024 07:57 AM 17 10 - 52 U/L Final     Comment:     Patients treated with Sulfasalazine may generate falsely decreased results for ALT.   01/26/2024 01:57 PM 15 10 - 52 U/L Final     Comment:     Patients treated with Sulfasalazine may generate falsely decreased results for ALT.     Creatinine   Date/Time Value Ref Range Status   02/09/2024 02:00 PM 0.80 0.50 - 1.30 mg/dL Final   01/31/2024 07:57 AM 0.82 0.50 - 1.30 mg/dL Final   01/26/2024 01:57 PM 0.78 0.50 - 1.30 mg/dL Final     Urea Nitrogen   Date/Time Value Ref Range Status   02/09/2024 02:00 PM 14 6 - 23 mg/dL Final   01/31/2024 07:57 AM 15 6 - 23 mg/dL Final   01/26/2024 01:57 PM 14 6 - 23 mg/dL Final     Albumin   Date/Time Value Ref Range Status   02/09/2024 02:00 PM 3.7 3.4 - 5.0 g/dL Final   01/31/2024 07:57 AM 3.5 3.4 - 5.0 g/dL Final   01/26/2024 01:57 PM 3.5 3.4 - 5.0 g/dL Final     No results found for: \"\"  Carcinoembryonic AG   Date/Time Value Ref Range Status   01/02/2024 09:13 AM 14.5 ug/L Final   12/27/2023 08:24 AM 13.4 ug/L Final   12/13/2023 12:41 PM 11.3 ug/L Final     .    Assessment/Plan     Assessment:    73 y.o. man with metastatic rectal cancer with liver and lung lesions diagnosed originally in 6/2023. Developed progressive disease in 12/2023.  He started FOLFIRI + Bevacizumab after that.      Plan:  Stage IV rectal Ca: s/p 3 cycles of FOLFIRI / Yohana   C4 tomorrow   RTC in 2 weeks with scans prior to visit       H/o ITP -     - s/p splencectomy   -   plt " cutoff is 75,000 for chemotherapy   -receiving romiplostim every other week during chemo.       VIRAL Flores-CNP     [facility]/ [facility]  Office: [phone]  Fax: [phone]

## 2024-02-13 NOTE — PROGRESS NOTES
Patient in clinic today for follow up visit. Denies n/v/d, her reports resolved neuropathy. Medications and allergies reviewed with patient.

## 2024-02-14 ENCOUNTER — INFUSION (OUTPATIENT)
Dept: HEMATOLOGY/ONCOLOGY | Facility: CLINIC | Age: 74
End: 2024-02-14
Payer: MEDICARE

## 2024-02-14 VITALS
HEART RATE: 53 BPM | BODY MASS INDEX: 27.02 KG/M2 | DIASTOLIC BLOOD PRESSURE: 83 MMHG | RESPIRATION RATE: 18 BRPM | SYSTOLIC BLOOD PRESSURE: 136 MMHG | WEIGHT: 204.81 LBS | OXYGEN SATURATION: 96 % | TEMPERATURE: 96.6 F

## 2024-02-14 DIAGNOSIS — C20 MALIGNANT NEOPLASM OF RECTUM (MULTI): ICD-10-CM

## 2024-02-14 PROCEDURE — 2500000004 HC RX 250 GENERAL PHARMACY W/ HCPCS (ALT 636 FOR OP/ED): Performed by: NURSE PRACTITIONER

## 2024-02-14 PROCEDURE — 96413 CHEMO IV INFUSION 1 HR: CPT

## 2024-02-14 PROCEDURE — 96375 TX/PRO/DX INJ NEW DRUG ADDON: CPT | Mod: INF

## 2024-02-14 PROCEDURE — 96411 CHEMO IV PUSH ADDL DRUG: CPT

## 2024-02-14 PROCEDURE — 96416 CHEMO PROLONG INFUSE W/PUMP: CPT

## 2024-02-14 RX ORDER — ALBUTEROL SULFATE 0.83 MG/ML
3 SOLUTION RESPIRATORY (INHALATION) AS NEEDED
Status: DISCONTINUED | OUTPATIENT
Start: 2024-02-14 | End: 2024-02-14 | Stop reason: HOSPADM

## 2024-02-14 RX ORDER — PROCHLORPERAZINE EDISYLATE 5 MG/ML
10 INJECTION INTRAMUSCULAR; INTRAVENOUS EVERY 6 HOURS PRN
Status: DISCONTINUED | OUTPATIENT
Start: 2024-02-14 | End: 2024-02-14 | Stop reason: HOSPADM

## 2024-02-14 RX ORDER — EPINEPHRINE 0.3 MG/.3ML
0.3 INJECTION SUBCUTANEOUS EVERY 5 MIN PRN
Status: DISCONTINUED | OUTPATIENT
Start: 2024-02-14 | End: 2024-02-14 | Stop reason: HOSPADM

## 2024-02-14 RX ORDER — DIPHENHYDRAMINE HYDROCHLORIDE 50 MG/ML
50 INJECTION INTRAMUSCULAR; INTRAVENOUS AS NEEDED
Status: DISCONTINUED | OUTPATIENT
Start: 2024-02-14 | End: 2024-02-14 | Stop reason: HOSPADM

## 2024-02-14 RX ORDER — PROCHLORPERAZINE MALEATE 10 MG
10 TABLET ORAL EVERY 6 HOURS PRN
Status: DISCONTINUED | OUTPATIENT
Start: 2024-02-14 | End: 2024-02-14 | Stop reason: HOSPADM

## 2024-02-14 RX ORDER — PALONOSETRON 0.05 MG/ML
0.25 INJECTION, SOLUTION INTRAVENOUS ONCE
Status: COMPLETED | OUTPATIENT
Start: 2024-02-14 | End: 2024-02-14

## 2024-02-14 RX ORDER — ATROPINE SULFATE 0.4 MG/ML
0.4 INJECTION, SOLUTION ENDOTRACHEAL; INTRAMEDULLARY; INTRAMUSCULAR; INTRAVENOUS; SUBCUTANEOUS
Status: DISCONTINUED | OUTPATIENT
Start: 2024-02-14 | End: 2024-02-14 | Stop reason: HOSPADM

## 2024-02-14 RX ORDER — FAMOTIDINE 10 MG/ML
20 INJECTION INTRAVENOUS ONCE AS NEEDED
Status: DISCONTINUED | OUTPATIENT
Start: 2024-02-14 | End: 2024-02-14 | Stop reason: HOSPADM

## 2024-02-14 RX ADMIN — IRINOTECAN HYDROCHLORIDE 400 MG: 20 INJECTION, SOLUTION INTRAVENOUS at 13:52

## 2024-02-14 RX ADMIN — PALONOSETRON HYDROCHLORIDE 250 MCG: 0.25 INJECTION INTRAVENOUS at 12:56

## 2024-02-14 RX ADMIN — DEXAMETHASONE SODIUM PHOSPHATE 12 MG: 10 INJECTION, SOLUTION INTRAMUSCULAR; INTRAVENOUS at 12:57

## 2024-02-14 RX ADMIN — FLUOROURACIL 5350 MG: 50 INJECTION, SOLUTION INTRAVENOUS at 15:30

## 2024-02-14 RX ADMIN — BEVACIZUMAB-AWWB 500 MG: 400 INJECTION, SOLUTION INTRAVENOUS at 13:17

## 2024-02-14 RX ADMIN — ATROPINE SULFATE 0.4 MG: 0.4 INJECTION, SOLUTION INTRAVENOUS at 13:52

## 2024-02-14 ASSESSMENT — PAIN SCALES - GENERAL: PAINLEVEL: 0-NO PAIN

## 2024-02-16 ENCOUNTER — INFUSION (OUTPATIENT)
Dept: HEMATOLOGY/ONCOLOGY | Facility: CLINIC | Age: 74
End: 2024-02-16
Payer: MEDICARE

## 2024-02-16 VITALS
HEART RATE: 45 BPM | SYSTOLIC BLOOD PRESSURE: 144 MMHG | TEMPERATURE: 97.7 F | WEIGHT: 204.37 LBS | BODY MASS INDEX: 26.96 KG/M2 | DIASTOLIC BLOOD PRESSURE: 73 MMHG | OXYGEN SATURATION: 98 %

## 2024-02-16 DIAGNOSIS — C20 MALIGNANT NEOPLASM OF RECTUM (MULTI): ICD-10-CM

## 2024-02-16 DIAGNOSIS — D69.3 IDIOPATHIC THROMBOCYTOPENIC PURPURA (ITP) (MULTI): ICD-10-CM

## 2024-02-16 PROCEDURE — 96523 IRRIG DRUG DELIVERY DEVICE: CPT

## 2024-02-16 PROCEDURE — 2500000004 HC RX 250 GENERAL PHARMACY W/ HCPCS (ALT 636 FOR OP/ED): Performed by: INTERNAL MEDICINE

## 2024-02-16 RX ORDER — HEPARIN SODIUM,PORCINE/PF 10 UNIT/ML
50 SYRINGE (ML) INTRAVENOUS AS NEEDED
Status: CANCELLED | OUTPATIENT
Start: 2024-02-16

## 2024-02-16 RX ORDER — HEPARIN SODIUM,PORCINE/PF 10 UNIT/ML
50 SYRINGE (ML) INTRAVENOUS AS NEEDED
Status: DISCONTINUED | OUTPATIENT
Start: 2024-02-16 | End: 2024-02-16 | Stop reason: HOSPADM

## 2024-02-16 RX ORDER — HEPARIN 100 UNIT/ML
500 SYRINGE INTRAVENOUS AS NEEDED
Status: DISCONTINUED | OUTPATIENT
Start: 2024-02-16 | End: 2024-02-16 | Stop reason: HOSPADM

## 2024-02-16 RX ORDER — HEPARIN 100 UNIT/ML
500 SYRINGE INTRAVENOUS AS NEEDED
Status: CANCELLED | OUTPATIENT
Start: 2024-02-16

## 2024-02-16 RX ADMIN — HEPARIN 500 UNITS: 100 SYRINGE at 13:47

## 2024-02-16 NOTE — PROGRESS NOTES
Nplate ordered for every 7 days but per patient and MD note, patient to get every other week alternating with chemo so not given today.

## 2024-02-29 ENCOUNTER — HOSPITAL ENCOUNTER (OUTPATIENT)
Dept: RADIOLOGY | Facility: CLINIC | Age: 74
Discharge: HOME | End: 2024-02-29
Payer: MEDICARE

## 2024-02-29 DIAGNOSIS — C19 COLORECTAL CANCER (MULTI): ICD-10-CM

## 2024-02-29 PROCEDURE — 71260 CT THORAX DX C+: CPT | Performed by: RADIOLOGY

## 2024-02-29 PROCEDURE — 2550000001 HC RX 255 CONTRASTS: Performed by: NURSE PRACTITIONER

## 2024-02-29 PROCEDURE — 74177 CT ABD & PELVIS W/CONTRAST: CPT | Performed by: RADIOLOGY

## 2024-02-29 PROCEDURE — 74177 CT ABD & PELVIS W/CONTRAST: CPT

## 2024-02-29 RX ADMIN — IOHEXOL 75 ML: 350 INJECTION, SOLUTION INTRAVENOUS at 14:03

## 2024-03-05 ENCOUNTER — TELEPHONE (OUTPATIENT)
Dept: ADMISSION | Facility: HOSPITAL | Age: 74
End: 2024-03-05
Payer: MEDICARE

## 2024-03-05 NOTE — TELEPHONE ENCOUNTER
Message left on identified VM with appt date/time 3/7 at 120 and option if pt wants virtual appt Dr. Pickard approves.   Contact info left.

## 2024-03-05 NOTE — TELEPHONE ENCOUNTER
Pt states he completed his CT scan on 2/29 as directed.   He would like to discuss results with Dr. Pickard and determine plan of care needed.   No FUV scheduled. Orders for infusion in Epic but not completed.

## 2024-03-07 ENCOUNTER — APPOINTMENT (OUTPATIENT)
Dept: HEMATOLOGY/ONCOLOGY | Facility: HOSPITAL | Age: 74
End: 2024-03-07
Payer: MEDICARE

## 2024-03-07 ENCOUNTER — TELEMEDICINE (OUTPATIENT)
Dept: HEMATOLOGY/ONCOLOGY | Facility: HOSPITAL | Age: 74
End: 2024-03-07
Payer: MEDICARE

## 2024-03-07 DIAGNOSIS — C20 MALIGNANT NEOPLASM OF RECTUM (MULTI): ICD-10-CM

## 2024-03-07 PROCEDURE — 99024 POSTOP FOLLOW-UP VISIT: CPT | Performed by: INTERNAL MEDICINE

## 2024-03-07 PROCEDURE — 1159F MED LIST DOCD IN RCRD: CPT | Performed by: INTERNAL MEDICINE

## 2024-03-07 PROCEDURE — 1160F RVW MEDS BY RX/DR IN RCRD: CPT | Performed by: INTERNAL MEDICINE

## 2024-03-07 NOTE — PROGRESS NOTES
Patient ID: Bobby Holland is a 73 y.o. male from [place], [place].     Referring Physician: Bobby Pickard MD  [address] [place] [address]    Primary Care Provider: Bobby Pickard MD    Cancer History:   Treatment Synopsis:    DIAGNOSIS  Rectal Cancer 5/2023     STAGING  [id number]     CURRENT SITES OF DISEASE  Rectum, liver and lung     MOLECULAR GENOMICS    Test Name: Zapper xT (XT.V4)  Laboratory Name: [facility]  Specimen Source: Colon, rectosigmoid  Collection Date: 15-May-2023  Cancer Type: Invasive adenocarcinoma  Report Id: [id number]    Molecular Findings:  * Gene: KRAS, Variant: Missense variant (exon 2) - GOF, Potentially Actionable, p.G12V (Allele Frequency - 16.6%)  * Gene: APC, Variant: Frameshift - LOF, Biologically Relevant, p.Q8128qc (Allele Frequency - 26.6%)  * Gene: APC, Variant: Stop gain - LOF, Biologically Relevant, p.R213*, Nonsense (Allele Frequency - 20.2%)  * Gene: FBXW7, Variant: Missense variant - LOF, Biologically Relevant, p.R505C (Allele Frequency - 23.3%)  * Gene: CCND1, Variant: Amplification, Copy number gain, Biologically Relevant  * Gene: FGF3, Variant: Amplification, Copy number gain, Biologically Relevant  * Gene: FGF4, Variant: Amplification, Copy number gain, Biologically Relevant  * Biomarker: Microsatellite Instability, Status: stable  * Biomarker: Tumor Mutation [place] (5.8 Muts/Mb, Percentile: 63.0)  * Disease associated genes/variants with no reportable findings:     * BRAF    * NRAS  * 7 variants of unknown significance    ** End of copied information **     SERUM TUMOR MARKER  CEA 4.4 - June 2023     CURRENT THERAPY  Dec 2023 progression- changed to FOLFIRI      PRIOR THERAPY  July 2023 - Dec 2023 FOLFOX      DEVICES  mediport     CURRENT PROBLEM LIST   ITP  - spleenectomy 2012   Graves ds  Metastatic colorectal cancer        Treatment History:    73 y.o.  male presenting to the emergency room with hematochezia in early April hemoglobin low taken off  Eliquis.  Bleeding initially resolved.  Anoscopy in May  2023 showed a large circumferential nonobstructing mass 7 cm in size at 9 cm from the anal verge.  Biopsy showed moderately differentiated adenocarcinoma.  MMR was proficient.  CEA was 4.7.  Imaging with Dr. Mccrary which showed lung and liver metastases.   PET scan confirmed FDG avidity of these lesions.          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  Social History:  Bobby reports that he has never smoked. He has never been exposed to tobacco smoke. He has never used smokeless tobacco. He reports current alcohol use of about 2.0 standard drinks of alcohol per week. He reports that he does not use drugs.  Family History:  No family history on file.  Family Oncology History:  Cancer-related family history is not on file.        SUBJECTIVE:    History of Present Illness:  Bobby Holland is a 73 y.o. male who was referred by Bobby Pickard MD and presents with No chief complaint on file.    S/p 3 cycles FOLFIRI - here for assmt prior to C4   Overall feels well - denies any cough, sob, cp   No fevers or chills.   No diarrhea   Energy level is pretty good   Denies pain     OBJECTIVE:    VS / Pain:  There were no vitals taken for this visit.  BSA: There is no height or weight on file to calculate BSA.   Pain Scale: 0    Daily Weight  02/16/24 : 92.7 kg (204 lb 5.9 oz)  02/14/24 : 92.9 kg (204 lb 12.9 oz)  02/13/24 : 94 kg (207 lb 3.7 oz)  02/09/24 : 94.7 kg (208 lb 12.4 oz)  02/02/24 : 94.3 kg (207 lb 14.3 oz)  01/31/24 : 94.5 kg (208 lb 5.4 oz)  01/29/24 : 95.1 kg (209 lb 9 oz)      Physical Exam  Constitutional:       Appearance: He is normal weight.   HENT:      Nose: Nose normal.      Mouth/Throat:      Mouth: Mucous membranes are moist.   Eyes:      Pupils: Pupils are equal, round, and  reactive to light.   Cardiovascular:      Rate and Rhythm: Normal rate.      Pulses: Normal pulses.   Abdominal:      General: Abdomen is flat.   Musculoskeletal:         General: Normal range of motion.   Skin:     General: Skin is warm.   Neurological:      General: No focal deficit present.      Mental Status: He is alert.   Psychiatric:         Mood and Affect: Mood normal.         Judgment: Judgment normal.       Performance Status:   ECOG 1    Diagnostic Results         WBC   Date/Time Value Ref Range Status   02/13/2024 02:25 PM 6.6 4.4 - 11.3 x10*3/uL Final   02/09/2024 02:00 PM 5.8 4.4 - 11.3 x10*3/uL Final   01/31/2024 07:57 AM 5.9 4.4 - 11.3 x10*3/uL Final     Hemoglobin   Date Value Ref Range Status   02/13/2024 11.1 (L) 13.5 - 17.5 g/dL Final   02/09/2024 10.8 (L) 13.5 - 17.5 g/dL Final   01/31/2024 11.2 (L) 13.5 - 17.5 g/dL Final     MCV   Date/Time Value Ref Range Status   02/13/2024 02:25 PM 86 80 - 100 fL Final   02/09/2024 02:00 PM 84 80 - 100 fL Final   01/31/2024 07:57 AM 85 80 - 100 fL Final     Platelets   Date/Time Value Ref Range Status   02/13/2024 02:25  150 - 450 x10*3/uL Final   02/09/2024 02:00  150 - 450 x10*3/uL Final   01/31/2024 07:57  150 - 450 x10*3/uL Final     Neutrophils Absolute   Date/Time Value Ref Range Status   02/13/2024 02:25 PM 2.60 1.60 - 5.50 x10*3/uL Final     Comment:     Percent differential counts (%) should be interpreted in the context of the absolute cell counts (cells/uL).   02/09/2024 02:00 PM 2.15 1.60 - 5.50 x10*3/uL Final     Comment:     Percent differential counts (%) should be interpreted in the context of the absolute cell counts (cells/uL).   01/31/2024 07:57 AM 2.16 1.60 - 5.50 x10*3/uL Final     Comment:     Percent differential counts (%) should be interpreted in the context of the absolute cell counts (cells/uL).     Bilirubin, Total   Date/Time Value Ref Range Status   02/13/2024 02:25 PM 0.5 0.0 - 1.2 mg/dL Final   02/09/2024  "02:00 PM 0.5 0.0 - 1.2 mg/dL Final   01/31/2024 07:57 AM 0.6 0.0 - 1.2 mg/dL Final     AST   Date/Time Value Ref Range Status   02/13/2024 02:25 PM 22 9 - 39 U/L Final   02/09/2024 02:00 PM 19 9 - 39 U/L Final   01/31/2024 07:57 AM 22 9 - 39 U/L Final     ALT   Date/Time Value Ref Range Status   02/13/2024 02:25 PM 19 10 - 52 U/L Final     Comment:     Patients treated with Sulfasalazine may generate falsely decreased results for ALT.   02/09/2024 02:00 PM 17 10 - 52 U/L Final     Comment:     Patients treated with Sulfasalazine may generate falsely decreased results for ALT.   01/31/2024 07:57 AM 17 10 - 52 U/L Final     Comment:     Patients treated with Sulfasalazine may generate falsely decreased results for ALT.     Creatinine   Date/Time Value Ref Range Status   02/13/2024 02:25 PM 0.92 0.50 - 1.30 mg/dL Final   02/09/2024 02:00 PM 0.80 0.50 - 1.30 mg/dL Final   01/31/2024 07:57 AM 0.82 0.50 - 1.30 mg/dL Final     Urea Nitrogen   Date/Time Value Ref Range Status   02/13/2024 02:25 PM 15 6 - 23 mg/dL Final   02/09/2024 02:00 PM 14 6 - 23 mg/dL Final   01/31/2024 07:57 AM 15 6 - 23 mg/dL Final     Albumin   Date/Time Value Ref Range Status   02/13/2024 02:25 PM 3.6 3.4 - 5.0 g/dL Final   02/09/2024 02:00 PM 3.7 3.4 - 5.0 g/dL Final   01/31/2024 07:57 AM 3.5 3.4 - 5.0 g/dL Final     No results found for: \"\"  Carcinoembryonic AG   Date/Time Value Ref Range Status   01/02/2024 09:13 AM 14.5 ug/L Final   12/27/2023 08:24 AM 13.4 ug/L Final   12/13/2023 12:41 PM 11.3 ug/L Final     === 02/29/24 ===    CT CHEST ABDOMEN PELVIS W IV CONTRAST    - Impression -  Colorectal cancer restaging exam compared to CT dated 12/12/2023:  1. Overall stable appearance of metastatic disease including numerous  hypoattenuating hepatic lesions, scattered pulmonary lesions,  mediastinal and hilar nodes, and circumferential thickening of the  superior rectum. No evidence of new or worsening metastatic disease.  2. Interval increased " size of a large right inguinal hernia  containing multiple loops of small bowel and mesenteric fat. No  evidence of obstruction or strangulation.    I personally reviewed the image(s)/study and resident interpretation  as stated by Dr. Kylie Hernandez MD. I agree with the findings as  stated. This study was interpreted at [facility], [place], [place].    MACRO:  None.    Signed by: Rg Levy 2/29/2024 5:42 PM  Dictation workstation:   [id number]        Assessment/Plan     Assessment:    73 y.o. man with metastatic rectal cancer with liver and lung lesions diagnosed originally in 6/2023. Developed progressive disease in 12/2023.  He started FOLFIRI + Bevacizumab after that.      Plan:  Stage IV rectal Ca: s/p 3 cycles of FOLFIRI / Yohana   Stable disease on FOLFIRI + Yohana after several week break.      H/o ITP -     - s/p splencectomy   -   plt cutoff is 75,000 for chemotherapy   -receiving romiplostim every other week during chemo.       Bobby Pickard MD     [facility]/ [facility]  Office: [phone]  Fax: [phone]

## 2024-03-22 ENCOUNTER — INFUSION (OUTPATIENT)
Dept: HEMATOLOGY/ONCOLOGY | Facility: CLINIC | Age: 74
End: 2024-03-22
Payer: MEDICARE

## 2024-03-22 VITALS
SYSTOLIC BLOOD PRESSURE: 139 MMHG | TEMPERATURE: 96.6 F | BODY MASS INDEX: 27.04 KG/M2 | RESPIRATION RATE: 18 BRPM | DIASTOLIC BLOOD PRESSURE: 85 MMHG | OXYGEN SATURATION: 97 % | WEIGHT: 204.92 LBS | HEART RATE: 53 BPM

## 2024-03-22 DIAGNOSIS — D69.3 IDIOPATHIC THROMBOCYTOPENIC PURPURA (ITP) (MULTI): ICD-10-CM

## 2024-03-22 DIAGNOSIS — C20 MALIGNANT NEOPLASM OF RECTUM (MULTI): ICD-10-CM

## 2024-03-22 LAB
ALBUMIN SERPL BCP-MCNC: 3.7 G/DL (ref 3.4–5)
ALP SERPL-CCNC: 103 U/L (ref 33–136)
ALT SERPL W P-5'-P-CCNC: 17 U/L (ref 10–52)
ANION GAP SERPL CALC-SCNC: 13 MMOL/L (ref 10–20)
APPEARANCE UR: CLEAR
AST SERPL W P-5'-P-CCNC: 22 U/L (ref 9–39)
BASOPHILS # BLD AUTO: 0.09 X10*3/UL (ref 0–0.1)
BASOPHILS NFR BLD AUTO: 0.8 %
BILIRUB SERPL-MCNC: 0.6 MG/DL (ref 0–1.2)
BILIRUB UR STRIP.AUTO-MCNC: NEGATIVE MG/DL
BUN SERPL-MCNC: 19 MG/DL (ref 6–23)
CALCIUM SERPL-MCNC: 9.7 MG/DL (ref 8.6–10.3)
CHLORIDE SERPL-SCNC: 102 MMOL/L (ref 98–107)
CO2 SERPL-SCNC: 27 MMOL/L (ref 21–32)
COLOR UR: NORMAL
CREAT SERPL-MCNC: 0.73 MG/DL (ref 0.5–1.3)
EGFRCR SERPLBLD CKD-EPI 2021: >90 ML/MIN/1.73M*2
EOSINOPHIL # BLD AUTO: 0.27 X10*3/UL (ref 0–0.4)
EOSINOPHIL NFR BLD AUTO: 2.3 %
ERYTHROCYTE [DISTWIDTH] IN BLOOD BY AUTOMATED COUNT: 18.4 % (ref 11.5–14.5)
GLUCOSE SERPL-MCNC: 98 MG/DL (ref 74–99)
GLUCOSE UR STRIP.AUTO-MCNC: NORMAL MG/DL
HCT VFR BLD AUTO: 37.7 % (ref 41–52)
HGB BLD-MCNC: 12 G/DL (ref 13.5–17.5)
IMM GRANULOCYTES # BLD AUTO: 0.04 X10*3/UL (ref 0–0.5)
IMM GRANULOCYTES NFR BLD AUTO: 0.3 % (ref 0–0.9)
KETONES UR STRIP.AUTO-MCNC: NEGATIVE MG/DL
LEUKOCYTE ESTERASE UR QL STRIP.AUTO: NEGATIVE
LYMPHOCYTES # BLD AUTO: 2.9 X10*3/UL (ref 0.8–3)
LYMPHOCYTES NFR BLD AUTO: 24.8 %
MCH RBC QN AUTO: 26.5 PG (ref 26–34)
MCHC RBC AUTO-ENTMCNC: 31.8 G/DL (ref 32–36)
MCV RBC AUTO: 83 FL (ref 80–100)
MONOCYTES # BLD AUTO: 1.38 X10*3/UL (ref 0.05–0.8)
MONOCYTES NFR BLD AUTO: 11.8 %
NEUTROPHILS # BLD AUTO: 7.02 X10*3/UL (ref 1.6–5.5)
NEUTROPHILS NFR BLD AUTO: 60 %
NITRITE UR QL STRIP.AUTO: NEGATIVE
NRBC BLD-RTO: 0 /100 WBCS (ref 0–0)
PH UR STRIP.AUTO: 5.5 [PH]
PLATELET # BLD AUTO: 209 X10*3/UL (ref 150–450)
POTASSIUM SERPL-SCNC: 3.9 MMOL/L (ref 3.5–5.3)
PROT SERPL-MCNC: 7.7 G/DL (ref 6.4–8.2)
PROT UR STRIP.AUTO-MCNC: NEGATIVE MG/DL
RBC # BLD AUTO: 4.52 X10*6/UL (ref 4.5–5.9)
RBC # UR STRIP.AUTO: NEGATIVE /UL
SODIUM SERPL-SCNC: 138 MMOL/L (ref 136–145)
SP GR UR STRIP.AUTO: 1.01
UROBILINOGEN UR STRIP.AUTO-MCNC: NORMAL MG/DL
WBC # BLD AUTO: 11.7 X10*3/UL (ref 4.4–11.3)

## 2024-03-22 PROCEDURE — 85025 COMPLETE CBC W/AUTO DIFF WBC: CPT | Performed by: INTERNAL MEDICINE

## 2024-03-22 PROCEDURE — 36591 DRAW BLOOD OFF VENOUS DEVICE: CPT

## 2024-03-22 PROCEDURE — 80053 COMPREHEN METABOLIC PANEL: CPT | Performed by: INTERNAL MEDICINE

## 2024-03-22 PROCEDURE — 2500000004 HC RX 250 GENERAL PHARMACY W/ HCPCS (ALT 636 FOR OP/ED): Performed by: INTERNAL MEDICINE

## 2024-03-22 PROCEDURE — 81003 URINALYSIS AUTO W/O SCOPE: CPT | Performed by: NURSE PRACTITIONER

## 2024-03-22 RX ORDER — DIPHENHYDRAMINE HYDROCHLORIDE 50 MG/ML
50 INJECTION INTRAMUSCULAR; INTRAVENOUS AS NEEDED
Status: DISCONTINUED | OUTPATIENT
Start: 2024-03-22 | End: 2024-03-22 | Stop reason: HOSPADM

## 2024-03-22 RX ORDER — HEPARIN SODIUM,PORCINE/PF 10 UNIT/ML
50 SYRINGE (ML) INTRAVENOUS AS NEEDED
Status: CANCELLED | OUTPATIENT
Start: 2024-03-22

## 2024-03-22 RX ORDER — FAMOTIDINE 10 MG/ML
20 INJECTION INTRAVENOUS ONCE AS NEEDED
Status: CANCELLED | OUTPATIENT
Start: 2024-03-29

## 2024-03-22 RX ORDER — HEPARIN SODIUM,PORCINE/PF 10 UNIT/ML
50 SYRINGE (ML) INTRAVENOUS AS NEEDED
Status: DISCONTINUED | OUTPATIENT
Start: 2024-03-22 | End: 2024-03-22 | Stop reason: HOSPADM

## 2024-03-22 RX ORDER — HEPARIN 100 UNIT/ML
500 SYRINGE INTRAVENOUS AS NEEDED
Status: DISCONTINUED | OUTPATIENT
Start: 2024-03-22 | End: 2024-03-22 | Stop reason: HOSPADM

## 2024-03-22 RX ORDER — FAMOTIDINE 10 MG/ML
20 INJECTION INTRAVENOUS ONCE AS NEEDED
Status: DISCONTINUED | OUTPATIENT
Start: 2024-03-22 | End: 2024-03-22 | Stop reason: HOSPADM

## 2024-03-22 RX ORDER — EPINEPHRINE 0.3 MG/.3ML
0.3 INJECTION SUBCUTANEOUS EVERY 5 MIN PRN
Status: DISCONTINUED | OUTPATIENT
Start: 2024-03-22 | End: 2024-03-22 | Stop reason: HOSPADM

## 2024-03-22 RX ORDER — DIPHENHYDRAMINE HYDROCHLORIDE 50 MG/ML
50 INJECTION INTRAMUSCULAR; INTRAVENOUS AS NEEDED
Status: CANCELLED | OUTPATIENT
Start: 2024-03-29

## 2024-03-22 RX ORDER — HEPARIN 100 UNIT/ML
500 SYRINGE INTRAVENOUS AS NEEDED
Status: CANCELLED | OUTPATIENT
Start: 2024-03-22

## 2024-03-22 RX ORDER — ALBUTEROL SULFATE 0.83 MG/ML
3 SOLUTION RESPIRATORY (INHALATION) AS NEEDED
Status: CANCELLED | OUTPATIENT
Start: 2024-03-29

## 2024-03-22 RX ORDER — ALBUTEROL SULFATE 0.83 MG/ML
3 SOLUTION RESPIRATORY (INHALATION) AS NEEDED
Status: DISCONTINUED | OUTPATIENT
Start: 2024-03-22 | End: 2024-03-22 | Stop reason: HOSPADM

## 2024-03-22 RX ORDER — EPINEPHRINE 0.3 MG/.3ML
0.3 INJECTION SUBCUTANEOUS EVERY 5 MIN PRN
Status: CANCELLED | OUTPATIENT
Start: 2024-03-29

## 2024-03-22 RX ADMIN — HEPARIN 500 UNITS: 100 SYRINGE at 14:43

## 2024-03-22 ASSESSMENT — PAIN SCALES - GENERAL: PAINLEVEL: 0-NO PAIN

## 2024-03-22 NOTE — PROGRESS NOTES
Platelet count 209 today. Last NPLATE injection was 2/9 (95mcg), last chemo treatment was 2/14 (folfiri), next chemo treatment is 3/27. Reviewed with MD Pickard and pharmacy, per MD Pickard hold NPLATE injection today. Plan for next NPLATE injection 4/5 pending lab results.

## 2024-03-26 ENCOUNTER — TELEPHONE (OUTPATIENT)
Dept: ENDOCRINOLOGY | Facility: CLINIC | Age: 74
End: 2024-03-26
Payer: MEDICARE

## 2024-03-26 DIAGNOSIS — C20 MALIGNANT NEOPLASM OF RECTUM (MULTI): Primary | ICD-10-CM

## 2024-03-26 RX ORDER — PROCHLORPERAZINE MALEATE 10 MG
10 TABLET ORAL EVERY 6 HOURS PRN
Status: CANCELLED | OUTPATIENT
Start: 2024-03-26

## 2024-03-26 RX ORDER — PALONOSETRON 0.05 MG/ML
0.25 INJECTION, SOLUTION INTRAVENOUS ONCE
Status: CANCELLED | OUTPATIENT
Start: 2024-03-26

## 2024-03-26 RX ORDER — ATROPINE SULFATE 0.4 MG/ML
0.4 INJECTION, SOLUTION ENDOTRACHEAL; INTRAMEDULLARY; INTRAMUSCULAR; INTRAVENOUS; SUBCUTANEOUS
Status: CANCELLED | OUTPATIENT
Start: 2024-03-26

## 2024-03-26 RX ORDER — FAMOTIDINE 10 MG/ML
20 INJECTION INTRAVENOUS ONCE AS NEEDED
Status: CANCELLED | OUTPATIENT
Start: 2024-03-26

## 2024-03-26 RX ORDER — EPINEPHRINE 0.3 MG/.3ML
0.3 INJECTION SUBCUTANEOUS EVERY 5 MIN PRN
Status: CANCELLED | OUTPATIENT
Start: 2024-03-26

## 2024-03-26 RX ORDER — DIPHENHYDRAMINE HYDROCHLORIDE 50 MG/ML
50 INJECTION INTRAMUSCULAR; INTRAVENOUS AS NEEDED
Status: CANCELLED | OUTPATIENT
Start: 2024-03-26

## 2024-03-26 RX ORDER — ALBUTEROL SULFATE 0.83 MG/ML
3 SOLUTION RESPIRATORY (INHALATION) AS NEEDED
Status: CANCELLED | OUTPATIENT
Start: 2024-03-26

## 2024-03-26 RX ORDER — PROCHLORPERAZINE EDISYLATE 5 MG/ML
10 INJECTION INTRAMUSCULAR; INTRAVENOUS EVERY 6 HOURS PRN
Status: CANCELLED | OUTPATIENT
Start: 2024-03-26

## 2024-03-26 NOTE — TELEPHONE ENCOUNTER
----- Message from Juliano Clancy MD sent at 2/18/2024  8:42 PM EST -----  Please advise TSH normal in January off medication.  Retest and follow up in April as planned.

## 2024-03-26 NOTE — TELEPHONE ENCOUNTER
Patient was called and left VM stating they had results in their mychart with info on what to do for their results back in January. Pt was informed they could access the info in the [employer] maggy as well as a message describing the result information was in the maggy as well.

## 2024-03-27 ENCOUNTER — INFUSION (OUTPATIENT)
Dept: HEMATOLOGY/ONCOLOGY | Facility: CLINIC | Age: 74
End: 2024-03-27
Payer: MEDICARE

## 2024-03-27 VITALS
BODY MASS INDEX: 27 KG/M2 | OXYGEN SATURATION: 99 % | SYSTOLIC BLOOD PRESSURE: 147 MMHG | WEIGHT: 203.71 LBS | TEMPERATURE: 97.7 F | HEIGHT: 73 IN | DIASTOLIC BLOOD PRESSURE: 81 MMHG | HEART RATE: 52 BPM

## 2024-03-27 DIAGNOSIS — C20 MALIGNANT NEOPLASM OF RECTUM (MULTI): ICD-10-CM

## 2024-03-27 LAB
ALBUMIN SERPL BCP-MCNC: 3.6 G/DL (ref 3.4–5)
ALP SERPL-CCNC: 89 U/L (ref 33–136)
ALT SERPL W P-5'-P-CCNC: 16 U/L (ref 10–52)
ANION GAP SERPL CALC-SCNC: 13 MMOL/L (ref 10–20)
AST SERPL W P-5'-P-CCNC: 20 U/L (ref 9–39)
BASOPHILS # BLD AUTO: 0.07 X10*3/UL (ref 0–0.1)
BASOPHILS NFR BLD AUTO: 0.7 %
BILIRUB SERPL-MCNC: 0.5 MG/DL (ref 0–1.2)
BUN SERPL-MCNC: 18 MG/DL (ref 6–23)
CALCIUM SERPL-MCNC: 9.2 MG/DL (ref 8.6–10.3)
CEA SERPL-MCNC: 12.4 UG/L
CHLORIDE SERPL-SCNC: 105 MMOL/L (ref 98–107)
CO2 SERPL-SCNC: 26 MMOL/L (ref 21–32)
CREAT SERPL-MCNC: 0.77 MG/DL (ref 0.5–1.3)
EGFRCR SERPLBLD CKD-EPI 2021: >90 ML/MIN/1.73M*2
EOSINOPHIL # BLD AUTO: 0.29 X10*3/UL (ref 0–0.4)
EOSINOPHIL NFR BLD AUTO: 3 %
ERYTHROCYTE [DISTWIDTH] IN BLOOD BY AUTOMATED COUNT: 18.1 % (ref 11.5–14.5)
GLUCOSE SERPL-MCNC: 97 MG/DL (ref 74–99)
HCT VFR BLD AUTO: 35.7 % (ref 41–52)
HGB BLD-MCNC: 11.6 G/DL (ref 13.5–17.5)
IMM GRANULOCYTES # BLD AUTO: 0.01 X10*3/UL (ref 0–0.5)
IMM GRANULOCYTES NFR BLD AUTO: 0.1 % (ref 0–0.9)
LYMPHOCYTES # BLD AUTO: 2.77 X10*3/UL (ref 0.8–3)
LYMPHOCYTES NFR BLD AUTO: 29 %
MCH RBC QN AUTO: 27.2 PG (ref 26–34)
MCHC RBC AUTO-ENTMCNC: 32.5 G/DL (ref 32–36)
MCV RBC AUTO: 84 FL (ref 80–100)
MONOCYTES # BLD AUTO: 1.14 X10*3/UL (ref 0.05–0.8)
MONOCYTES NFR BLD AUTO: 11.9 %
NEUTROPHILS # BLD AUTO: 5.27 X10*3/UL (ref 1.6–5.5)
NEUTROPHILS NFR BLD AUTO: 55.3 %
NRBC BLD-RTO: 0 /100 WBCS (ref 0–0)
PLATELET # BLD AUTO: 174 X10*3/UL (ref 150–450)
POTASSIUM SERPL-SCNC: 3.9 MMOL/L (ref 3.5–5.3)
PROT SERPL-MCNC: 7.3 G/DL (ref 6.4–8.2)
RBC # BLD AUTO: 4.27 X10*6/UL (ref 4.5–5.9)
SODIUM SERPL-SCNC: 140 MMOL/L (ref 136–145)
WBC # BLD AUTO: 9.6 X10*3/UL (ref 4.4–11.3)

## 2024-03-27 PROCEDURE — 96411 CHEMO IV PUSH ADDL DRUG: CPT

## 2024-03-27 PROCEDURE — 96413 CHEMO IV INFUSION 1 HR: CPT

## 2024-03-27 PROCEDURE — 84075 ASSAY ALKALINE PHOSPHATASE: CPT | Performed by: INTERNAL MEDICINE

## 2024-03-27 PROCEDURE — 85025 COMPLETE CBC W/AUTO DIFF WBC: CPT | Performed by: INTERNAL MEDICINE

## 2024-03-27 PROCEDURE — 96416 CHEMO PROLONG INFUSE W/PUMP: CPT

## 2024-03-27 PROCEDURE — 2500000004 HC RX 250 GENERAL PHARMACY W/ HCPCS (ALT 636 FOR OP/ED): Performed by: INTERNAL MEDICINE

## 2024-03-27 PROCEDURE — 96375 TX/PRO/DX INJ NEW DRUG ADDON: CPT | Mod: INF

## 2024-03-27 PROCEDURE — 81001 URINALYSIS AUTO W/SCOPE: CPT | Performed by: INTERNAL MEDICINE

## 2024-03-27 PROCEDURE — 82378 CARCINOEMBRYONIC ANTIGEN: CPT | Mod: WESLAB | Performed by: INTERNAL MEDICINE

## 2024-03-27 RX ORDER — ALBUTEROL SULFATE 0.83 MG/ML
3 SOLUTION RESPIRATORY (INHALATION) AS NEEDED
Status: DISCONTINUED | OUTPATIENT
Start: 2024-03-27 | End: 2024-03-27 | Stop reason: HOSPADM

## 2024-03-27 RX ORDER — DIPHENHYDRAMINE HYDROCHLORIDE 50 MG/ML
50 INJECTION INTRAMUSCULAR; INTRAVENOUS AS NEEDED
Status: DISCONTINUED | OUTPATIENT
Start: 2024-03-27 | End: 2024-03-27 | Stop reason: HOSPADM

## 2024-03-27 RX ORDER — FAMOTIDINE 10 MG/ML
20 INJECTION INTRAVENOUS ONCE AS NEEDED
Status: DISCONTINUED | OUTPATIENT
Start: 2024-03-27 | End: 2024-03-27 | Stop reason: HOSPADM

## 2024-03-27 RX ORDER — PROCHLORPERAZINE MALEATE 10 MG
10 TABLET ORAL EVERY 6 HOURS PRN
Status: DISCONTINUED | OUTPATIENT
Start: 2024-03-27 | End: 2024-03-27 | Stop reason: HOSPADM

## 2024-03-27 RX ORDER — EPINEPHRINE 0.3 MG/.3ML
0.3 INJECTION SUBCUTANEOUS EVERY 5 MIN PRN
Status: DISCONTINUED | OUTPATIENT
Start: 2024-03-27 | End: 2024-03-27 | Stop reason: HOSPADM

## 2024-03-27 RX ORDER — ATROPINE SULFATE 0.4 MG/ML
0.4 INJECTION, SOLUTION ENDOTRACHEAL; INTRAMEDULLARY; INTRAMUSCULAR; INTRAVENOUS; SUBCUTANEOUS
Status: DISCONTINUED | OUTPATIENT
Start: 2024-03-27 | End: 2024-03-27 | Stop reason: HOSPADM

## 2024-03-27 RX ORDER — PALONOSETRON 0.05 MG/ML
0.25 INJECTION, SOLUTION INTRAVENOUS ONCE
Status: COMPLETED | OUTPATIENT
Start: 2024-03-27 | End: 2024-03-27

## 2024-03-27 RX ORDER — PROCHLORPERAZINE EDISYLATE 5 MG/ML
10 INJECTION INTRAMUSCULAR; INTRAVENOUS EVERY 6 HOURS PRN
Status: DISCONTINUED | OUTPATIENT
Start: 2024-03-27 | End: 2024-03-27 | Stop reason: HOSPADM

## 2024-03-27 RX ADMIN — PALONOSETRON HYDROCHLORIDE 250 MCG: 0.25 INJECTION INTRAVENOUS at 13:11

## 2024-03-27 RX ADMIN — IRINOTECAN HYDROCHLORIDE 400 MG: 20 INJECTION, SOLUTION INTRAVENOUS at 13:48

## 2024-03-27 RX ADMIN — BEVACIZUMAB-AWWB 500 MG: 400 INJECTION, SOLUTION INTRAVENOUS at 13:33

## 2024-03-27 RX ADMIN — ATROPINE SULFATE 0.4 MG: 0.4 INJECTION, SOLUTION INTRAVENOUS at 13:47

## 2024-03-27 RX ADMIN — FLUOROURACIL 5350 MG: 50 INJECTION, SOLUTION INTRAVENOUS at 15:47

## 2024-03-27 RX ADMIN — DEXAMETHASONE SODIUM PHOSPHATE 12 MG: 10 INJECTION, SOLUTION INTRAMUSCULAR; INTRAVENOUS at 13:12

## 2024-03-27 ASSESSMENT — PAIN SCALES - GENERAL: PAINLEVEL: 0-NO PAIN

## 2024-03-28 LAB
APPEARANCE UR: ABNORMAL
BILIRUB UR STRIP.AUTO-MCNC: NEGATIVE MG/DL
COLOR UR: YELLOW
GLUCOSE UR STRIP.AUTO-MCNC: NORMAL MG/DL
KETONES UR STRIP.AUTO-MCNC: NEGATIVE MG/DL
LEUKOCYTE ESTERASE UR QL STRIP.AUTO: NEGATIVE
MUCOUS THREADS #/AREA URNS AUTO: NORMAL /LPF
NITRITE UR QL STRIP.AUTO: NEGATIVE
PH UR STRIP.AUTO: 5.5 [PH]
PROT UR STRIP.AUTO-MCNC: ABNORMAL MG/DL
RBC # UR STRIP.AUTO: NEGATIVE /UL
RBC #/AREA URNS AUTO: NORMAL /HPF
SP GR UR STRIP.AUTO: 1.02
UROBILINOGEN UR STRIP.AUTO-MCNC: NORMAL MG/DL
WBC #/AREA URNS AUTO: NORMAL /HPF

## 2024-03-29 ENCOUNTER — INFUSION (OUTPATIENT)
Dept: HEMATOLOGY/ONCOLOGY | Facility: CLINIC | Age: 74
End: 2024-03-29
Payer: MEDICARE

## 2024-03-29 VITALS
BODY MASS INDEX: 27.13 KG/M2 | SYSTOLIC BLOOD PRESSURE: 137 MMHG | OXYGEN SATURATION: 96 % | RESPIRATION RATE: 18 BRPM | HEART RATE: 56 BPM | DIASTOLIC BLOOD PRESSURE: 80 MMHG | TEMPERATURE: 96.7 F | WEIGHT: 204.48 LBS

## 2024-03-29 DIAGNOSIS — D69.3 IDIOPATHIC THROMBOCYTOPENIC PURPURA (ITP) (MULTI): ICD-10-CM

## 2024-03-29 DIAGNOSIS — C20 MALIGNANT NEOPLASM OF RECTUM (MULTI): ICD-10-CM

## 2024-03-29 PROCEDURE — 96523 IRRIG DRUG DELIVERY DEVICE: CPT

## 2024-03-29 PROCEDURE — 2500000004 HC RX 250 GENERAL PHARMACY W/ HCPCS (ALT 636 FOR OP/ED): Performed by: INTERNAL MEDICINE

## 2024-03-29 RX ORDER — HEPARIN SODIUM,PORCINE/PF 10 UNIT/ML
50 SYRINGE (ML) INTRAVENOUS AS NEEDED
Status: CANCELLED | OUTPATIENT
Start: 2024-03-29

## 2024-03-29 RX ORDER — HEPARIN 100 UNIT/ML
500 SYRINGE INTRAVENOUS AS NEEDED
Status: CANCELLED | OUTPATIENT
Start: 2024-03-29

## 2024-03-29 RX ORDER — HEPARIN SODIUM,PORCINE/PF 10 UNIT/ML
50 SYRINGE (ML) INTRAVENOUS AS NEEDED
Status: DISCONTINUED | OUTPATIENT
Start: 2024-03-29 | End: 2024-03-29 | Stop reason: HOSPADM

## 2024-03-29 RX ORDER — HEPARIN 100 UNIT/ML
500 SYRINGE INTRAVENOUS AS NEEDED
Status: DISCONTINUED | OUTPATIENT
Start: 2024-03-29 | End: 2024-03-29 | Stop reason: HOSPADM

## 2024-03-29 RX ADMIN — HEPARIN 500 UNITS: 100 SYRINGE at 14:02

## 2024-03-29 ASSESSMENT — PAIN SCALES - GENERAL: PAINLEVEL: 0-NO PAIN

## 2024-04-03 DIAGNOSIS — C20 MALIGNANT NEOPLASM OF RECTUM (MULTI): Primary | ICD-10-CM

## 2024-04-03 RX ORDER — EPINEPHRINE 0.3 MG/.3ML
0.3 INJECTION SUBCUTANEOUS EVERY 5 MIN PRN
Status: CANCELLED | OUTPATIENT
Start: 2024-04-23

## 2024-04-03 RX ORDER — FAMOTIDINE 10 MG/ML
20 INJECTION INTRAVENOUS ONCE AS NEEDED
Status: CANCELLED | OUTPATIENT
Start: 2024-04-09

## 2024-04-03 RX ORDER — ALBUTEROL SULFATE 0.83 MG/ML
3 SOLUTION RESPIRATORY (INHALATION) AS NEEDED
Status: CANCELLED | OUTPATIENT
Start: 2024-04-23

## 2024-04-03 RX ORDER — EPINEPHRINE 0.3 MG/.3ML
0.3 INJECTION SUBCUTANEOUS EVERY 5 MIN PRN
Status: CANCELLED | OUTPATIENT
Start: 2024-04-09

## 2024-04-03 RX ORDER — PROCHLORPERAZINE MALEATE 10 MG
10 TABLET ORAL EVERY 6 HOURS PRN
Status: CANCELLED | OUTPATIENT
Start: 2024-04-23

## 2024-04-03 RX ORDER — PALONOSETRON 0.05 MG/ML
0.25 INJECTION, SOLUTION INTRAVENOUS ONCE
Status: CANCELLED | OUTPATIENT
Start: 2024-04-23

## 2024-04-03 RX ORDER — ATROPINE SULFATE 0.4 MG/ML
0.4 INJECTION, SOLUTION ENDOTRACHEAL; INTRAMEDULLARY; INTRAMUSCULAR; INTRAVENOUS; SUBCUTANEOUS
Status: CANCELLED | OUTPATIENT
Start: 2024-04-23

## 2024-04-03 RX ORDER — DIPHENHYDRAMINE HYDROCHLORIDE 50 MG/ML
50 INJECTION INTRAMUSCULAR; INTRAVENOUS AS NEEDED
Status: CANCELLED | OUTPATIENT
Start: 2024-04-09

## 2024-04-03 RX ORDER — PROCHLORPERAZINE EDISYLATE 5 MG/ML
10 INJECTION INTRAMUSCULAR; INTRAVENOUS EVERY 6 HOURS PRN
Status: CANCELLED | OUTPATIENT
Start: 2024-04-09

## 2024-04-03 RX ORDER — FAMOTIDINE 10 MG/ML
20 INJECTION INTRAVENOUS ONCE AS NEEDED
Status: CANCELLED | OUTPATIENT
Start: 2024-04-23

## 2024-04-03 RX ORDER — PALONOSETRON 0.05 MG/ML
0.25 INJECTION, SOLUTION INTRAVENOUS ONCE
Status: CANCELLED | OUTPATIENT
Start: 2024-04-09

## 2024-04-03 RX ORDER — DIPHENHYDRAMINE HYDROCHLORIDE 50 MG/ML
50 INJECTION INTRAMUSCULAR; INTRAVENOUS AS NEEDED
Status: CANCELLED | OUTPATIENT
Start: 2024-04-23

## 2024-04-03 RX ORDER — PROCHLORPERAZINE MALEATE 10 MG
10 TABLET ORAL EVERY 6 HOURS PRN
Status: CANCELLED | OUTPATIENT
Start: 2024-04-09

## 2024-04-03 RX ORDER — ALBUTEROL SULFATE 0.83 MG/ML
3 SOLUTION RESPIRATORY (INHALATION) AS NEEDED
Status: CANCELLED | OUTPATIENT
Start: 2024-04-09

## 2024-04-03 RX ORDER — PROCHLORPERAZINE EDISYLATE 5 MG/ML
10 INJECTION INTRAMUSCULAR; INTRAVENOUS EVERY 6 HOURS PRN
Status: CANCELLED | OUTPATIENT
Start: 2024-04-23

## 2024-04-03 RX ORDER — ATROPINE SULFATE 0.4 MG/ML
0.4 INJECTION, SOLUTION ENDOTRACHEAL; INTRAMEDULLARY; INTRAMUSCULAR; INTRAVENOUS; SUBCUTANEOUS
Status: CANCELLED | OUTPATIENT
Start: 2024-04-09

## 2024-04-05 ENCOUNTER — INFUSION (OUTPATIENT)
Dept: HEMATOLOGY/ONCOLOGY | Facility: CLINIC | Age: 74
End: 2024-04-05
Payer: MEDICARE

## 2024-04-05 VITALS
TEMPERATURE: 97.1 F | DIASTOLIC BLOOD PRESSURE: 90 MMHG | BODY MASS INDEX: 26.6 KG/M2 | WEIGHT: 200.51 LBS | OXYGEN SATURATION: 98 % | RESPIRATION RATE: 18 BRPM | HEART RATE: 61 BPM | SYSTOLIC BLOOD PRESSURE: 166 MMHG

## 2024-04-05 DIAGNOSIS — D69.3 IDIOPATHIC THROMBOCYTOPENIC PURPURA (ITP) (MULTI): Primary | ICD-10-CM

## 2024-04-05 DIAGNOSIS — C20 MALIGNANT NEOPLASM OF RECTUM (MULTI): Primary | ICD-10-CM

## 2024-04-05 DIAGNOSIS — C20 MALIGNANT NEOPLASM OF RECTUM (MULTI): ICD-10-CM

## 2024-04-05 DIAGNOSIS — D69.3 IDIOPATHIC THROMBOCYTOPENIC PURPURA (ITP) (MULTI): ICD-10-CM

## 2024-04-05 LAB
ALBUMIN SERPL BCP-MCNC: 3.4 G/DL (ref 3.4–5)
ALP SERPL-CCNC: 87 U/L (ref 33–136)
ALT SERPL W P-5'-P-CCNC: 16 U/L (ref 10–52)
ANION GAP SERPL CALC-SCNC: 12 MMOL/L (ref 10–20)
AST SERPL W P-5'-P-CCNC: 17 U/L (ref 9–39)
BASOPHILS # BLD AUTO: 0.03 X10*3/UL (ref 0–0.1)
BASOPHILS NFR BLD AUTO: 0.5 %
BILIRUB SERPL-MCNC: 0.6 MG/DL (ref 0–1.2)
BUN SERPL-MCNC: 13 MG/DL (ref 6–23)
CALCIUM SERPL-MCNC: 9.3 MG/DL (ref 8.6–10.3)
CHLORIDE SERPL-SCNC: 100 MMOL/L (ref 98–107)
CO2 SERPL-SCNC: 29 MMOL/L (ref 21–32)
CREAT SERPL-MCNC: 0.68 MG/DL (ref 0.5–1.3)
EGFRCR SERPLBLD CKD-EPI 2021: >90 ML/MIN/1.73M*2
EOSINOPHIL # BLD AUTO: 0.1 X10*3/UL (ref 0–0.4)
EOSINOPHIL NFR BLD AUTO: 1.5 %
ERYTHROCYTE [DISTWIDTH] IN BLOOD BY AUTOMATED COUNT: 17.1 % (ref 11.5–14.5)
GLUCOSE SERPL-MCNC: 98 MG/DL (ref 74–99)
HCT VFR BLD AUTO: 34.6 % (ref 41–52)
HGB BLD-MCNC: 11.3 G/DL (ref 13.5–17.5)
IMM GRANULOCYTES # BLD AUTO: 0.02 X10*3/UL (ref 0–0.5)
IMM GRANULOCYTES NFR BLD AUTO: 0.3 % (ref 0–0.9)
LYMPHOCYTES # BLD AUTO: 2.56 X10*3/UL (ref 0.8–3)
LYMPHOCYTES NFR BLD AUTO: 39.4 %
MCH RBC QN AUTO: 27.1 PG (ref 26–34)
MCHC RBC AUTO-ENTMCNC: 32.7 G/DL (ref 32–36)
MCV RBC AUTO: 83 FL (ref 80–100)
MONOCYTES # BLD AUTO: 0.54 X10*3/UL (ref 0.05–0.8)
MONOCYTES NFR BLD AUTO: 8.3 %
NEUTROPHILS # BLD AUTO: 3.24 X10*3/UL (ref 1.6–5.5)
NEUTROPHILS NFR BLD AUTO: 50 %
NRBC BLD-RTO: 0 /100 WBCS (ref 0–0)
PLATELET # BLD AUTO: 131 X10*3/UL (ref 150–450)
POTASSIUM SERPL-SCNC: 3.9 MMOL/L (ref 3.5–5.3)
PROT SERPL-MCNC: 6.8 G/DL (ref 6.4–8.2)
RBC # BLD AUTO: 4.17 X10*6/UL (ref 4.5–5.9)
SODIUM SERPL-SCNC: 137 MMOL/L (ref 136–145)
WBC # BLD AUTO: 6.5 X10*3/UL (ref 4.4–11.3)

## 2024-04-05 PROCEDURE — 2500000004 HC RX 250 GENERAL PHARMACY W/ HCPCS (ALT 636 FOR OP/ED): Performed by: INTERNAL MEDICINE

## 2024-04-05 PROCEDURE — 96372 THER/PROPH/DIAG INJ SC/IM: CPT | Performed by: INTERNAL MEDICINE

## 2024-04-05 PROCEDURE — 85025 COMPLETE CBC W/AUTO DIFF WBC: CPT | Performed by: INTERNAL MEDICINE

## 2024-04-05 PROCEDURE — 80053 COMPREHEN METABOLIC PANEL: CPT | Performed by: INTERNAL MEDICINE

## 2024-04-05 PROCEDURE — 96372 THER/PROPH/DIAG INJ SC/IM: CPT

## 2024-04-05 RX ORDER — EPINEPHRINE 0.3 MG/.3ML
0.3 INJECTION SUBCUTANEOUS EVERY 5 MIN PRN
Status: DISCONTINUED | OUTPATIENT
Start: 2024-04-05 | End: 2024-04-05 | Stop reason: HOSPADM

## 2024-04-05 RX ORDER — HEPARIN SODIUM,PORCINE/PF 10 UNIT/ML
50 SYRINGE (ML) INTRAVENOUS AS NEEDED
Status: CANCELLED | OUTPATIENT
Start: 2024-04-05

## 2024-04-05 RX ORDER — HEPARIN 100 UNIT/ML
500 SYRINGE INTRAVENOUS AS NEEDED
Status: DISCONTINUED | OUTPATIENT
Start: 2024-04-05 | End: 2024-04-05 | Stop reason: HOSPADM

## 2024-04-05 RX ORDER — ALBUTEROL SULFATE 0.83 MG/ML
3 SOLUTION RESPIRATORY (INHALATION) AS NEEDED
Status: DISCONTINUED | OUTPATIENT
Start: 2024-04-05 | End: 2024-04-05 | Stop reason: HOSPADM

## 2024-04-05 RX ORDER — FAMOTIDINE 10 MG/ML
20 INJECTION INTRAVENOUS ONCE AS NEEDED
Status: DISCONTINUED | OUTPATIENT
Start: 2024-04-05 | End: 2024-04-05 | Stop reason: HOSPADM

## 2024-04-05 RX ORDER — HEPARIN SODIUM,PORCINE/PF 10 UNIT/ML
50 SYRINGE (ML) INTRAVENOUS AS NEEDED
Status: DISCONTINUED | OUTPATIENT
Start: 2024-04-05 | End: 2024-04-05 | Stop reason: HOSPADM

## 2024-04-05 RX ORDER — FAMOTIDINE 10 MG/ML
20 INJECTION INTRAVENOUS ONCE AS NEEDED
Status: CANCELLED | OUTPATIENT
Start: 2024-04-12

## 2024-04-05 RX ORDER — EPINEPHRINE 0.3 MG/.3ML
0.3 INJECTION SUBCUTANEOUS EVERY 5 MIN PRN
Status: CANCELLED | OUTPATIENT
Start: 2024-04-12

## 2024-04-05 RX ORDER — DIPHENHYDRAMINE HYDROCHLORIDE 50 MG/ML
50 INJECTION INTRAMUSCULAR; INTRAVENOUS AS NEEDED
Status: DISCONTINUED | OUTPATIENT
Start: 2024-04-05 | End: 2024-04-05 | Stop reason: HOSPADM

## 2024-04-05 RX ORDER — ALBUTEROL SULFATE 0.83 MG/ML
3 SOLUTION RESPIRATORY (INHALATION) AS NEEDED
Status: CANCELLED | OUTPATIENT
Start: 2024-04-12

## 2024-04-05 RX ORDER — HEPARIN 100 UNIT/ML
500 SYRINGE INTRAVENOUS AS NEEDED
Status: CANCELLED | OUTPATIENT
Start: 2024-04-05

## 2024-04-05 RX ORDER — DIPHENHYDRAMINE HYDROCHLORIDE 50 MG/ML
50 INJECTION INTRAMUSCULAR; INTRAVENOUS AS NEEDED
Status: CANCELLED | OUTPATIENT
Start: 2024-04-12

## 2024-04-05 RX ADMIN — ROMIPLOSTIM 90 MCG: 125 INJECTION, POWDER, LYOPHILIZED, FOR SOLUTION SUBCUTANEOUS at 11:19

## 2024-04-05 RX ADMIN — ALTEPLASE 2 MG: 2.2 INJECTION, POWDER, LYOPHILIZED, FOR SOLUTION INTRAVENOUS at 09:27

## 2024-04-05 RX ADMIN — HEPARIN 500 UNITS: 100 SYRINGE at 10:37

## 2024-04-05 RX ADMIN — HEPARIN 500 UNITS: 100 SYRINGE at 09:04

## 2024-04-05 ASSESSMENT — PAIN SCALES - GENERAL: PAINLEVEL: 0-NO PAIN

## 2024-04-05 NOTE — PROGRESS NOTES
Verified with MD Pickard that NPLATE injection is to be given every other week (Q14 days), orders changed. Schedule adjusted, appointment with NP switched to virtual on treatment day 4/9.

## 2024-04-09 ENCOUNTER — INFUSION (OUTPATIENT)
Dept: HEMATOLOGY/ONCOLOGY | Facility: CLINIC | Age: 74
End: 2024-04-09
Payer: MEDICARE

## 2024-04-09 ENCOUNTER — TELEMEDICINE (OUTPATIENT)
Dept: HEMATOLOGY/ONCOLOGY | Facility: CLINIC | Age: 74
End: 2024-04-09
Payer: MEDICARE

## 2024-04-09 ENCOUNTER — APPOINTMENT (OUTPATIENT)
Dept: HEMATOLOGY/ONCOLOGY | Facility: CLINIC | Age: 74
End: 2024-04-09
Payer: MEDICARE

## 2024-04-09 VITALS
OXYGEN SATURATION: 96 % | SYSTOLIC BLOOD PRESSURE: 125 MMHG | BODY MASS INDEX: 26.43 KG/M2 | TEMPERATURE: 97.3 F | DIASTOLIC BLOOD PRESSURE: 74 MMHG | HEART RATE: 58 BPM | WEIGHT: 199.19 LBS | RESPIRATION RATE: 18 BRPM

## 2024-04-09 DIAGNOSIS — C20 MALIGNANT NEOPLASM OF RECTUM (MULTI): ICD-10-CM

## 2024-04-09 PROCEDURE — 96411 CHEMO IV PUSH ADDL DRUG: CPT

## 2024-04-09 PROCEDURE — 2500000004 HC RX 250 GENERAL PHARMACY W/ HCPCS (ALT 636 FOR OP/ED): Performed by: INTERNAL MEDICINE

## 2024-04-09 PROCEDURE — 96413 CHEMO IV INFUSION 1 HR: CPT

## 2024-04-09 PROCEDURE — 96375 TX/PRO/DX INJ NEW DRUG ADDON: CPT | Mod: INF

## 2024-04-09 RX ORDER — HEPARIN SODIUM,PORCINE/PF 10 UNIT/ML
50 SYRINGE (ML) INTRAVENOUS AS NEEDED
Status: CANCELLED | OUTPATIENT
Start: 2024-04-09

## 2024-04-09 RX ORDER — ATROPINE SULFATE 0.4 MG/ML
0.4 INJECTION, SOLUTION ENDOTRACHEAL; INTRAMEDULLARY; INTRAMUSCULAR; INTRAVENOUS; SUBCUTANEOUS
Status: DISCONTINUED | OUTPATIENT
Start: 2024-04-09 | End: 2024-04-09 | Stop reason: HOSPADM

## 2024-04-09 RX ORDER — DIPHENHYDRAMINE HYDROCHLORIDE 50 MG/ML
50 INJECTION INTRAMUSCULAR; INTRAVENOUS AS NEEDED
Status: DISCONTINUED | OUTPATIENT
Start: 2024-04-09 | End: 2024-04-09 | Stop reason: HOSPADM

## 2024-04-09 RX ORDER — HEPARIN 100 UNIT/ML
500 SYRINGE INTRAVENOUS AS NEEDED
Status: CANCELLED | OUTPATIENT
Start: 2024-04-09

## 2024-04-09 RX ORDER — FAMOTIDINE 10 MG/ML
20 INJECTION INTRAVENOUS ONCE AS NEEDED
Status: DISCONTINUED | OUTPATIENT
Start: 2024-04-09 | End: 2024-04-09 | Stop reason: HOSPADM

## 2024-04-09 RX ORDER — ALBUTEROL SULFATE 0.83 MG/ML
3 SOLUTION RESPIRATORY (INHALATION) AS NEEDED
Status: DISCONTINUED | OUTPATIENT
Start: 2024-04-09 | End: 2024-04-09 | Stop reason: HOSPADM

## 2024-04-09 RX ORDER — HEPARIN SODIUM,PORCINE/PF 10 UNIT/ML
50 SYRINGE (ML) INTRAVENOUS AS NEEDED
Status: DISCONTINUED | OUTPATIENT
Start: 2024-04-09 | End: 2024-04-09 | Stop reason: HOSPADM

## 2024-04-09 RX ORDER — EPINEPHRINE 0.3 MG/.3ML
0.3 INJECTION SUBCUTANEOUS EVERY 5 MIN PRN
Status: DISCONTINUED | OUTPATIENT
Start: 2024-04-09 | End: 2024-04-09 | Stop reason: HOSPADM

## 2024-04-09 RX ORDER — PROCHLORPERAZINE EDISYLATE 5 MG/ML
10 INJECTION INTRAMUSCULAR; INTRAVENOUS EVERY 6 HOURS PRN
Status: DISCONTINUED | OUTPATIENT
Start: 2024-04-09 | End: 2024-04-09 | Stop reason: HOSPADM

## 2024-04-09 RX ORDER — PALONOSETRON 0.05 MG/ML
0.25 INJECTION, SOLUTION INTRAVENOUS ONCE
Status: COMPLETED | OUTPATIENT
Start: 2024-04-09 | End: 2024-04-09

## 2024-04-09 RX ORDER — PROCHLORPERAZINE MALEATE 10 MG
10 TABLET ORAL EVERY 6 HOURS PRN
Status: DISCONTINUED | OUTPATIENT
Start: 2024-04-09 | End: 2024-04-09 | Stop reason: HOSPADM

## 2024-04-09 RX ORDER — HEPARIN 100 UNIT/ML
500 SYRINGE INTRAVENOUS AS NEEDED
Status: DISCONTINUED | OUTPATIENT
Start: 2024-04-09 | End: 2024-04-09 | Stop reason: HOSPADM

## 2024-04-09 RX ADMIN — ATROPINE SULFATE 0.4 MG: 0.4 INJECTION, SOLUTION INTRAVENOUS at 09:20

## 2024-04-09 RX ADMIN — BEVACIZUMAB-AWWB 500 MG: 400 INJECTION, SOLUTION INTRAVENOUS at 09:02

## 2024-04-09 RX ADMIN — PALONOSETRON HYDROCHLORIDE 250 MCG: 0.25 INJECTION INTRAVENOUS at 08:35

## 2024-04-09 RX ADMIN — DEXAMETHASONE SODIUM PHOSPHATE 12 MG: 10 INJECTION, SOLUTION INTRAMUSCULAR; INTRAVENOUS at 08:35

## 2024-04-09 RX ADMIN — IRINOTECAN HYDROCHLORIDE 400 MG: 20 INJECTION, SOLUTION INTRAVENOUS at 09:32

## 2024-04-09 RX ADMIN — FLUOROURACIL 5350 MG: 50 INJECTION, SOLUTION INTRAVENOUS at 11:33

## 2024-04-09 ASSESSMENT — PAIN SCALES - GENERAL: PAINLEVEL: 0-NO PAIN

## 2024-04-11 ENCOUNTER — INFUSION (OUTPATIENT)
Dept: HEMATOLOGY/ONCOLOGY | Facility: CLINIC | Age: 74
End: 2024-04-11
Payer: MEDICARE

## 2024-04-11 VITALS
OXYGEN SATURATION: 95 % | SYSTOLIC BLOOD PRESSURE: 148 MMHG | TEMPERATURE: 94.6 F | WEIGHT: 201.17 LBS | RESPIRATION RATE: 18 BRPM | DIASTOLIC BLOOD PRESSURE: 87 MMHG | HEART RATE: 51 BPM | BODY MASS INDEX: 26.69 KG/M2

## 2024-04-11 DIAGNOSIS — C20 MALIGNANT NEOPLASM OF RECTUM (MULTI): ICD-10-CM

## 2024-04-11 PROCEDURE — 96523 IRRIG DRUG DELIVERY DEVICE: CPT

## 2024-04-11 PROCEDURE — 2500000004 HC RX 250 GENERAL PHARMACY W/ HCPCS (ALT 636 FOR OP/ED): Performed by: INTERNAL MEDICINE

## 2024-04-11 RX ORDER — HEPARIN 100 UNIT/ML
500 SYRINGE INTRAVENOUS AS NEEDED
Status: CANCELLED | OUTPATIENT
Start: 2024-04-11

## 2024-04-11 RX ORDER — HEPARIN 100 UNIT/ML
500 SYRINGE INTRAVENOUS AS NEEDED
Status: DISCONTINUED | OUTPATIENT
Start: 2024-04-11 | End: 2024-04-11 | Stop reason: HOSPADM

## 2024-04-11 RX ORDER — HEPARIN SODIUM,PORCINE/PF 10 UNIT/ML
50 SYRINGE (ML) INTRAVENOUS AS NEEDED
Status: DISCONTINUED | OUTPATIENT
Start: 2024-04-11 | End: 2024-04-11 | Stop reason: HOSPADM

## 2024-04-11 RX ORDER — HEPARIN SODIUM,PORCINE/PF 10 UNIT/ML
50 SYRINGE (ML) INTRAVENOUS AS NEEDED
Status: CANCELLED | OUTPATIENT
Start: 2024-04-11

## 2024-04-11 RX ADMIN — HEPARIN 500 UNITS: 100 SYRINGE at 09:55

## 2024-04-11 ASSESSMENT — PAIN SCALES - GENERAL: PAINLEVEL: 0-NO PAIN

## 2024-04-11 NOTE — PROGRESS NOTES
Patient here for pump disconnect. He states he feels well, no changes in his assessment. Reviewed schedule, denies questions.

## 2024-04-12 ENCOUNTER — APPOINTMENT (OUTPATIENT)
Dept: HEMATOLOGY/ONCOLOGY | Facility: CLINIC | Age: 74
End: 2024-04-12
Payer: MEDICARE

## 2024-04-15 ENCOUNTER — OFFICE VISIT (OUTPATIENT)
Dept: ENDOCRINOLOGY | Facility: CLINIC | Age: 74
End: 2024-04-15
Payer: MEDICARE

## 2024-04-15 VITALS
HEART RATE: 53 BPM | DIASTOLIC BLOOD PRESSURE: 87 MMHG | SYSTOLIC BLOOD PRESSURE: 140 MMHG | BODY MASS INDEX: 26.93 KG/M2 | WEIGHT: 203 LBS

## 2024-04-15 DIAGNOSIS — E05.00 GRAVES DISEASE: Primary | ICD-10-CM

## 2024-04-15 PROCEDURE — 1036F TOBACCO NON-USER: CPT | Performed by: INTERNAL MEDICINE

## 2024-04-15 PROCEDURE — 3079F DIAST BP 80-89 MM HG: CPT | Performed by: INTERNAL MEDICINE

## 2024-04-15 PROCEDURE — 1159F MED LIST DOCD IN RCRD: CPT | Performed by: INTERNAL MEDICINE

## 2024-04-15 PROCEDURE — 1160F RVW MEDS BY RX/DR IN RCRD: CPT | Performed by: INTERNAL MEDICINE

## 2024-04-15 PROCEDURE — 3077F SYST BP >= 140 MM HG: CPT | Performed by: INTERNAL MEDICINE

## 2024-04-15 PROCEDURE — 99213 OFFICE O/P EST LOW 20 MIN: CPT | Performed by: INTERNAL MEDICINE

## 2024-04-15 ASSESSMENT — ENCOUNTER SYMPTOMS
FEVER: 0
WEAKNESS: 0
ABDOMINAL PAIN: 0
DIARRHEA: 0
NAUSEA: 0
NECK PAIN: 0
PALPITATIONS: 0
TREMORS: 0
UNEXPECTED WEIGHT CHANGE: 0
TROUBLE SWALLOWING: 0
FATIGUE: 0
HEADACHES: 0
VOMITING: 0
SHORTNESS OF BREATH: 0
NERVOUS/ANXIOUS: 0
CONSTIPATION: 0

## 2024-04-15 NOTE — PROGRESS NOTES
History Of Present Illness  Bobby Holland is a 73 y.o. male with Graves' disease    Methimazole stopped at last appointment 3 months ago    Colorectal cancer  Chemotherapy changed from FOLFOX to   Bevocizumab, fluorouracil, leucovorin, irinotecan    Past Medical History  He has a past medical history of Immune thrombocytopenic purpura (Multi) (03/11/2014).    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     Social History  He reports that he has never smoked. He has never been exposed to tobacco smoke. He has never used smokeless tobacco. He reports current alcohol use of about 2.0 standard drinks of alcohol per week. He reports that he does not use drugs.    Family History  No family history on file.    Allergies  Patient has no known allergies.    Medications  Current Outpatient Medications   Medication Instructions    propranolol (INDERAL) 60 mg, oral, 3 times daily       Review of Systems   Constitutional:  Negative for fatigue, fever and unexpected weight change.   HENT:  Negative for trouble swallowing.    Eyes:  Negative for visual disturbance.   Respiratory:  Negative for shortness of breath.    Cardiovascular:  Negative for chest pain and palpitations.   Gastrointestinal:  Negative for abdominal pain, constipation, diarrhea, nausea and vomiting.   Endocrine: Negative for cold intolerance and heat intolerance.   Musculoskeletal:  Negative for neck pain.   Skin:  Negative for rash.   Neurological:  Negative for tremors, weakness and headaches.   Psychiatric/Behavioral:  The patient is not nervous/anxious.          Last Recorded Vitals  Blood pressure 140/87, pulse 53, weight 92.1 kg (203 lb).    Physical Exam  Constitutional:       General: He is not in acute distress.  Neurological:      Mental Status: He is alert.   Psychiatric:         Mood and Affect: Affect normal.          Relevant Results  Lab Results   Component Value Date    TSH 3.28 01/15/2024     FREET4 1.01 01/15/2024    T3FREE 8.7 (H) 06/24/2018    RECE 1.68 03/18/2023    THYROIDPAB 36 (H) 06/23/2018    TSI 5.2 (H) 11/11/2019       IMPRESSION  GRAVES' DISEASE  Off methimazole 3 months  No current labs  Resolution of TRAb 1 year ago      RECOMMENDATIONS  Draw TSH, free T4  Results available on [facility]  Call/message if you have not received results in 3 days.     If normal plan follow up 6 months  Repeat TSH, free T4 before next appointment

## 2024-04-15 NOTE — PATIENT INSTRUCTIONS
RECOMMENDATIONS  Draw TSH, free T4  Results available on [facility]  Call/message if you have not received results in 3 days.     If normal plan follow up 6 months  Repeat TSH, free T4 before next appointment

## 2024-04-19 ENCOUNTER — APPOINTMENT (OUTPATIENT)
Dept: HEMATOLOGY/ONCOLOGY | Facility: CLINIC | Age: 74
End: 2024-04-19
Payer: MEDICARE

## 2024-04-19 ENCOUNTER — INFUSION (OUTPATIENT)
Dept: HEMATOLOGY/ONCOLOGY | Facility: CLINIC | Age: 74
End: 2024-04-19
Payer: MEDICARE

## 2024-04-19 VITALS
RESPIRATION RATE: 16 BRPM | SYSTOLIC BLOOD PRESSURE: 163 MMHG | WEIGHT: 206.02 LBS | DIASTOLIC BLOOD PRESSURE: 90 MMHG | HEART RATE: 60 BPM | OXYGEN SATURATION: 100 % | TEMPERATURE: 96.3 F | BODY MASS INDEX: 27.33 KG/M2

## 2024-04-19 DIAGNOSIS — D69.3 IDIOPATHIC THROMBOCYTOPENIC PURPURA (ITP) (MULTI): ICD-10-CM

## 2024-04-19 DIAGNOSIS — C20 MALIGNANT NEOPLASM OF RECTUM (MULTI): ICD-10-CM

## 2024-04-19 LAB
ALBUMIN SERPL BCP-MCNC: 3.5 G/DL (ref 3.4–5)
ALP SERPL-CCNC: 87 U/L (ref 33–136)
ALT SERPL W P-5'-P-CCNC: 14 U/L (ref 10–52)
ANION GAP SERPL CALC-SCNC: 12 MMOL/L (ref 10–20)
APPEARANCE UR: CLEAR
AST SERPL W P-5'-P-CCNC: 16 U/L (ref 9–39)
BASOPHILS # BLD AUTO: 0.04 X10*3/UL (ref 0–0.1)
BASOPHILS NFR BLD AUTO: 0.6 %
BILIRUB SERPL-MCNC: 0.4 MG/DL (ref 0–1.2)
BILIRUB UR STRIP.AUTO-MCNC: NEGATIVE MG/DL
BUN SERPL-MCNC: 14 MG/DL (ref 6–23)
CALCIUM SERPL-MCNC: 9.4 MG/DL (ref 8.6–10.3)
CHLORIDE SERPL-SCNC: 101 MMOL/L (ref 98–107)
CO2 SERPL-SCNC: 29 MMOL/L (ref 21–32)
COLOR UR: NORMAL
CREAT SERPL-MCNC: 0.73 MG/DL (ref 0.5–1.3)
EGFRCR SERPLBLD CKD-EPI 2021: >90 ML/MIN/1.73M*2
EOSINOPHIL # BLD AUTO: 0.06 X10*3/UL (ref 0–0.4)
EOSINOPHIL NFR BLD AUTO: 0.9 %
ERYTHROCYTE [DISTWIDTH] IN BLOOD BY AUTOMATED COUNT: 17.3 % (ref 11.5–14.5)
GLUCOSE SERPL-MCNC: 95 MG/DL (ref 74–99)
GLUCOSE UR STRIP.AUTO-MCNC: NORMAL MG/DL
HCT VFR BLD AUTO: 34.6 % (ref 41–52)
HGB BLD-MCNC: 11.1 G/DL (ref 13.5–17.5)
IMM GRANULOCYTES # BLD AUTO: 0.01 X10*3/UL (ref 0–0.5)
IMM GRANULOCYTES NFR BLD AUTO: 0.2 % (ref 0–0.9)
KETONES UR STRIP.AUTO-MCNC: NEGATIVE MG/DL
LEUKOCYTE ESTERASE UR QL STRIP.AUTO: NEGATIVE
LYMPHOCYTES # BLD AUTO: 2.82 X10*3/UL (ref 0.8–3)
LYMPHOCYTES NFR BLD AUTO: 43.5 %
MCH RBC QN AUTO: 27.3 PG (ref 26–34)
MCHC RBC AUTO-ENTMCNC: 32.1 G/DL (ref 32–36)
MCV RBC AUTO: 85 FL (ref 80–100)
MONOCYTES # BLD AUTO: 0.98 X10*3/UL (ref 0.05–0.8)
MONOCYTES NFR BLD AUTO: 15.1 %
NEUTROPHILS # BLD AUTO: 2.58 X10*3/UL (ref 1.6–5.5)
NEUTROPHILS NFR BLD AUTO: 39.7 %
NITRITE UR QL STRIP.AUTO: NEGATIVE
NRBC BLD-RTO: 0 /100 WBCS (ref 0–0)
PH UR STRIP.AUTO: 6 [PH]
PLATELET # BLD AUTO: 353 X10*3/UL (ref 150–450)
POTASSIUM SERPL-SCNC: 3.9 MMOL/L (ref 3.5–5.3)
PROT SERPL-MCNC: 7.1 G/DL (ref 6.4–8.2)
PROT UR STRIP.AUTO-MCNC: NEGATIVE MG/DL
RBC # BLD AUTO: 4.07 X10*6/UL (ref 4.5–5.9)
RBC # UR STRIP.AUTO: NEGATIVE /UL
SODIUM SERPL-SCNC: 138 MMOL/L (ref 136–145)
SP GR UR STRIP.AUTO: 1.02
UROBILINOGEN UR STRIP.AUTO-MCNC: NORMAL MG/DL
WBC # BLD AUTO: 6.5 X10*3/UL (ref 4.4–11.3)

## 2024-04-19 PROCEDURE — 81003 URINALYSIS AUTO W/O SCOPE: CPT | Performed by: INTERNAL MEDICINE

## 2024-04-19 PROCEDURE — 85025 COMPLETE CBC W/AUTO DIFF WBC: CPT | Performed by: INTERNAL MEDICINE

## 2024-04-19 PROCEDURE — 80053 COMPREHEN METABOLIC PANEL: CPT | Performed by: INTERNAL MEDICINE

## 2024-04-19 PROCEDURE — 2500000004 HC RX 250 GENERAL PHARMACY W/ HCPCS (ALT 636 FOR OP/ED): Mod: JW,JG | Performed by: INTERNAL MEDICINE

## 2024-04-19 PROCEDURE — 96372 THER/PROPH/DIAG INJ SC/IM: CPT

## 2024-04-19 RX ORDER — EPINEPHRINE 0.3 MG/.3ML
0.3 INJECTION SUBCUTANEOUS EVERY 5 MIN PRN
Status: CANCELLED | OUTPATIENT
Start: 2024-05-03

## 2024-04-19 RX ORDER — HEPARIN SODIUM,PORCINE/PF 10 UNIT/ML
50 SYRINGE (ML) INTRAVENOUS AS NEEDED
Status: CANCELLED | OUTPATIENT
Start: 2024-04-19

## 2024-04-19 RX ORDER — HEPARIN 100 UNIT/ML
500 SYRINGE INTRAVENOUS AS NEEDED
Status: CANCELLED | OUTPATIENT
Start: 2024-04-19

## 2024-04-19 RX ORDER — HEPARIN SODIUM,PORCINE/PF 10 UNIT/ML
50 SYRINGE (ML) INTRAVENOUS AS NEEDED
Status: DISCONTINUED | OUTPATIENT
Start: 2024-04-19 | End: 2024-04-19 | Stop reason: HOSPADM

## 2024-04-19 RX ORDER — FAMOTIDINE 10 MG/ML
20 INJECTION INTRAVENOUS ONCE AS NEEDED
Status: DISCONTINUED | OUTPATIENT
Start: 2024-04-19 | End: 2024-04-19 | Stop reason: HOSPADM

## 2024-04-19 RX ORDER — EPINEPHRINE 0.3 MG/.3ML
0.3 INJECTION SUBCUTANEOUS EVERY 5 MIN PRN
Status: DISCONTINUED | OUTPATIENT
Start: 2024-04-19 | End: 2024-04-19 | Stop reason: HOSPADM

## 2024-04-19 RX ORDER — ALBUTEROL SULFATE 0.83 MG/ML
3 SOLUTION RESPIRATORY (INHALATION) AS NEEDED
Status: CANCELLED | OUTPATIENT
Start: 2024-05-03

## 2024-04-19 RX ORDER — DIPHENHYDRAMINE HYDROCHLORIDE 50 MG/ML
50 INJECTION INTRAMUSCULAR; INTRAVENOUS AS NEEDED
Status: CANCELLED | OUTPATIENT
Start: 2024-05-03

## 2024-04-19 RX ORDER — FAMOTIDINE 10 MG/ML
20 INJECTION INTRAVENOUS ONCE AS NEEDED
Status: CANCELLED | OUTPATIENT
Start: 2024-05-03

## 2024-04-19 RX ORDER — DIPHENHYDRAMINE HYDROCHLORIDE 50 MG/ML
50 INJECTION INTRAMUSCULAR; INTRAVENOUS AS NEEDED
Status: DISCONTINUED | OUTPATIENT
Start: 2024-04-19 | End: 2024-04-19 | Stop reason: HOSPADM

## 2024-04-19 RX ORDER — HEPARIN 100 UNIT/ML
500 SYRINGE INTRAVENOUS AS NEEDED
Status: DISCONTINUED | OUTPATIENT
Start: 2024-04-19 | End: 2024-04-19 | Stop reason: HOSPADM

## 2024-04-19 RX ORDER — ALBUTEROL SULFATE 0.83 MG/ML
3 SOLUTION RESPIRATORY (INHALATION) AS NEEDED
Status: DISCONTINUED | OUTPATIENT
Start: 2024-04-19 | End: 2024-04-19 | Stop reason: HOSPADM

## 2024-04-19 RX ADMIN — ROMIPLOSTIM 95 MCG: 125 INJECTION, POWDER, LYOPHILIZED, FOR SOLUTION SUBCUTANEOUS at 14:25

## 2024-04-19 RX ADMIN — HEPARIN 500 UNITS: 100 SYRINGE at 13:44

## 2024-04-19 ASSESSMENT — PAIN SCALES - GENERAL: PAINLEVEL: 0-NO PAIN

## 2024-04-23 ENCOUNTER — INFUSION (OUTPATIENT)
Dept: HEMATOLOGY/ONCOLOGY | Facility: CLINIC | Age: 74
End: 2024-04-23
Payer: MEDICARE

## 2024-04-23 ENCOUNTER — TELEPHONE (OUTPATIENT)
Dept: HEMATOLOGY/ONCOLOGY | Facility: CLINIC | Age: 74
End: 2024-04-23
Payer: MEDICARE

## 2024-04-23 VITALS
OXYGEN SATURATION: 98 % | TEMPERATURE: 95 F | RESPIRATION RATE: 16 BRPM | BODY MASS INDEX: 26.66 KG/M2 | DIASTOLIC BLOOD PRESSURE: 79 MMHG | HEART RATE: 56 BPM | SYSTOLIC BLOOD PRESSURE: 132 MMHG | WEIGHT: 200.95 LBS

## 2024-04-23 DIAGNOSIS — C20 MALIGNANT NEOPLASM OF RECTUM (MULTI): ICD-10-CM

## 2024-04-23 LAB — CEA SERPL-MCNC: 10 UG/L

## 2024-04-23 PROCEDURE — 2500000004 HC RX 250 GENERAL PHARMACY W/ HCPCS (ALT 636 FOR OP/ED): Performed by: INTERNAL MEDICINE

## 2024-04-23 PROCEDURE — 82378 CARCINOEMBRYONIC ANTIGEN: CPT | Performed by: INTERNAL MEDICINE

## 2024-04-23 PROCEDURE — 96375 TX/PRO/DX INJ NEW DRUG ADDON: CPT | Mod: INF

## 2024-04-23 PROCEDURE — 96413 CHEMO IV INFUSION 1 HR: CPT

## 2024-04-23 PROCEDURE — 96411 CHEMO IV PUSH ADDL DRUG: CPT

## 2024-04-23 RX ORDER — ALBUTEROL SULFATE 0.83 MG/ML
3 SOLUTION RESPIRATORY (INHALATION) AS NEEDED
Status: DISCONTINUED | OUTPATIENT
Start: 2024-04-23 | End: 2024-04-23 | Stop reason: HOSPADM

## 2024-04-23 RX ORDER — HEPARIN SODIUM,PORCINE/PF 10 UNIT/ML
50 SYRINGE (ML) INTRAVENOUS AS NEEDED
Status: DISCONTINUED | OUTPATIENT
Start: 2024-04-23 | End: 2024-04-23 | Stop reason: HOSPADM

## 2024-04-23 RX ORDER — PROCHLORPERAZINE MALEATE 10 MG
10 TABLET ORAL EVERY 6 HOURS PRN
Status: DISCONTINUED | OUTPATIENT
Start: 2024-04-23 | End: 2024-04-23 | Stop reason: HOSPADM

## 2024-04-23 RX ORDER — FAMOTIDINE 10 MG/ML
20 INJECTION INTRAVENOUS ONCE AS NEEDED
Status: DISCONTINUED | OUTPATIENT
Start: 2024-04-23 | End: 2024-04-23 | Stop reason: HOSPADM

## 2024-04-23 RX ORDER — DIPHENHYDRAMINE HYDROCHLORIDE 50 MG/ML
50 INJECTION INTRAMUSCULAR; INTRAVENOUS AS NEEDED
Status: DISCONTINUED | OUTPATIENT
Start: 2024-04-23 | End: 2024-04-23 | Stop reason: HOSPADM

## 2024-04-23 RX ORDER — HEPARIN 100 UNIT/ML
500 SYRINGE INTRAVENOUS AS NEEDED
Status: CANCELLED | OUTPATIENT
Start: 2024-04-23

## 2024-04-23 RX ORDER — HEPARIN 100 UNIT/ML
500 SYRINGE INTRAVENOUS AS NEEDED
Status: DISCONTINUED | OUTPATIENT
Start: 2024-04-23 | End: 2024-04-23 | Stop reason: HOSPADM

## 2024-04-23 RX ORDER — EPINEPHRINE 0.3 MG/.3ML
0.3 INJECTION SUBCUTANEOUS EVERY 5 MIN PRN
Status: DISCONTINUED | OUTPATIENT
Start: 2024-04-23 | End: 2024-04-23 | Stop reason: HOSPADM

## 2024-04-23 RX ORDER — PALONOSETRON 0.05 MG/ML
0.25 INJECTION, SOLUTION INTRAVENOUS ONCE
Status: COMPLETED | OUTPATIENT
Start: 2024-04-23 | End: 2024-04-23

## 2024-04-23 RX ORDER — PROCHLORPERAZINE EDISYLATE 5 MG/ML
10 INJECTION INTRAMUSCULAR; INTRAVENOUS EVERY 6 HOURS PRN
Status: DISCONTINUED | OUTPATIENT
Start: 2024-04-23 | End: 2024-04-23 | Stop reason: HOSPADM

## 2024-04-23 RX ORDER — ATROPINE SULFATE 0.4 MG/ML
0.4 INJECTION, SOLUTION ENDOTRACHEAL; INTRAMEDULLARY; INTRAMUSCULAR; INTRAVENOUS; SUBCUTANEOUS
Status: DISCONTINUED | OUTPATIENT
Start: 2024-04-23 | End: 2024-04-23 | Stop reason: HOSPADM

## 2024-04-23 RX ORDER — HEPARIN SODIUM,PORCINE/PF 10 UNIT/ML
50 SYRINGE (ML) INTRAVENOUS AS NEEDED
Status: CANCELLED | OUTPATIENT
Start: 2024-04-23

## 2024-04-23 RX ADMIN — PALONOSETRON HYDROCHLORIDE 250 MCG: 0.25 INJECTION INTRAVENOUS at 08:38

## 2024-04-23 RX ADMIN — FLUOROURACIL 5350 MG: 50 INJECTION, SOLUTION INTRAVENOUS at 10:54

## 2024-04-23 RX ADMIN — DEXAMETHASONE SODIUM PHOSPHATE 12 MG: 10 INJECTION, SOLUTION INTRAMUSCULAR; INTRAVENOUS at 08:38

## 2024-04-23 RX ADMIN — IRINOTECAN HYDROCHLORIDE 400 MG: 20 INJECTION, SOLUTION INTRAVENOUS at 09:14

## 2024-04-23 RX ADMIN — ATROPINE SULFATE 0.4 MG: 0.4 INJECTION, SOLUTION INTRAVENOUS at 08:38

## 2024-04-23 RX ADMIN — BEVACIZUMAB-AWWB 500 MG: 400 INJECTION, SOLUTION INTRAVENOUS at 08:58

## 2024-04-23 ASSESSMENT — PAIN SCALES - GENERAL: PAINLEVEL: 0-NO PAIN

## 2024-04-25 ENCOUNTER — INFUSION (OUTPATIENT)
Dept: HEMATOLOGY/ONCOLOGY | Facility: CLINIC | Age: 74
End: 2024-04-25
Payer: MEDICARE

## 2024-04-25 VITALS
OXYGEN SATURATION: 98 % | DIASTOLIC BLOOD PRESSURE: 78 MMHG | RESPIRATION RATE: 18 BRPM | SYSTOLIC BLOOD PRESSURE: 136 MMHG | TEMPERATURE: 97.7 F | WEIGHT: 201.06 LBS | BODY MASS INDEX: 26.68 KG/M2 | HEART RATE: 50 BPM

## 2024-04-25 DIAGNOSIS — C20 MALIGNANT NEOPLASM OF RECTUM (MULTI): ICD-10-CM

## 2024-04-25 PROCEDURE — 96523 IRRIG DRUG DELIVERY DEVICE: CPT

## 2024-04-25 RX ORDER — HEPARIN 100 UNIT/ML
500 SYRINGE INTRAVENOUS AS NEEDED
Status: CANCELLED | OUTPATIENT
Start: 2024-04-25

## 2024-04-25 RX ORDER — HEPARIN SODIUM,PORCINE/PF 10 UNIT/ML
50 SYRINGE (ML) INTRAVENOUS AS NEEDED
Status: CANCELLED | OUTPATIENT
Start: 2024-04-25

## 2024-04-25 RX ORDER — HEPARIN 100 UNIT/ML
500 SYRINGE INTRAVENOUS AS NEEDED
Status: DISCONTINUED | OUTPATIENT
Start: 2024-04-25 | End: 2024-04-25 | Stop reason: HOSPADM

## 2024-04-25 RX ORDER — HEPARIN SODIUM,PORCINE/PF 10 UNIT/ML
50 SYRINGE (ML) INTRAVENOUS AS NEEDED
Status: DISCONTINUED | OUTPATIENT
Start: 2024-04-25 | End: 2024-04-25 | Stop reason: HOSPADM

## 2024-04-25 ASSESSMENT — PAIN SCALES - GENERAL: PAINLEVEL: 0-NO PAIN

## 2024-04-30 ENCOUNTER — OFFICE VISIT (OUTPATIENT)
Dept: HEMATOLOGY/ONCOLOGY | Facility: CLINIC | Age: 74
End: 2024-04-30
Payer: MEDICARE

## 2024-04-30 VITALS
WEIGHT: 204.81 LBS | BODY MASS INDEX: 27.17 KG/M2 | HEART RATE: 49 BPM | SYSTOLIC BLOOD PRESSURE: 134 MMHG | RESPIRATION RATE: 18 BRPM | DIASTOLIC BLOOD PRESSURE: 82 MMHG | OXYGEN SATURATION: 95 % | TEMPERATURE: 97.2 F

## 2024-04-30 DIAGNOSIS — C19 COLORECTAL CANCER (MULTI): Primary | ICD-10-CM

## 2024-04-30 DIAGNOSIS — C20 MALIGNANT NEOPLASM OF RECTUM (MULTI): ICD-10-CM

## 2024-04-30 PROCEDURE — 99214 OFFICE O/P EST MOD 30 MIN: CPT | Performed by: NURSE PRACTITIONER

## 2024-04-30 PROCEDURE — 1160F RVW MEDS BY RX/DR IN RCRD: CPT | Performed by: NURSE PRACTITIONER

## 2024-04-30 PROCEDURE — 1036F TOBACCO NON-USER: CPT | Performed by: NURSE PRACTITIONER

## 2024-04-30 PROCEDURE — 1126F AMNT PAIN NOTED NONE PRSNT: CPT | Performed by: NURSE PRACTITIONER

## 2024-04-30 PROCEDURE — 3079F DIAST BP 80-89 MM HG: CPT | Performed by: NURSE PRACTITIONER

## 2024-04-30 PROCEDURE — 1159F MED LIST DOCD IN RCRD: CPT | Performed by: NURSE PRACTITIONER

## 2024-04-30 PROCEDURE — 3075F SYST BP GE 130 - 139MM HG: CPT | Performed by: NURSE PRACTITIONER

## 2024-04-30 ASSESSMENT — PAIN SCALES - GENERAL: PAINLEVEL: 0-NO PAIN

## 2024-04-30 NOTE — PROGRESS NOTES
Pt is here for FUV after follow up visit after treatment with Oma OTTO. Pt had last treatment 4/23 Beva + Folfiri C7. Next treatment 5/7 for C8. Pt states he has intermittent nausea, but no vomiting. Last episode of diarrhea was 1 month ago according to patient. The diarrhea was cleared up by otc medication. Pt states he has good appetite but only eats 2 meals a day. RN educated on pt to try and have calroic drinks to help with keeping weight up during chemo. Pt stated he has lost 10lbs in the past 6 months. Complains of moderate fatigue after chemo. Oma OTTO aware.

## 2024-04-30 NOTE — PROGRESS NOTES
Patient ID: Bobby Holland is a 73 y.o. male from [place], [place].     Referring Physician: Oma Ott, APRN-CNP  [address] [place] [address]    Primary Care Provider: Bobby Pickard MD    Cancer History:   Treatment Synopsis:    DIAGNOSIS  Rectal Cancer 5/2023     STAGING  [id number]     CURRENT SITES OF DISEASE  Rectum, liver and lung     MOLECULAR GENOMICS    Test Name: Xtime xT (XT.V4)  Laboratory Name: [facility]  Specimen Source: Colon, rectosigmoid  Collection Date: 15-May-2023  Cancer Type: Invasive adenocarcinoma  Report Id: [id number]    Molecular Findings:  * Gene: KRAS, Variant: Missense variant (exon 2) - GOF, Potentially Actionable, p.G12V (Allele Frequency - 16.6%)  * Gene: APC, Variant: Frameshift - LOF, Biologically Relevant, p.V6846jy (Allele Frequency - 26.6%)  * Gene: APC, Variant: Stop gain - LOF, Biologically Relevant, p.R213*, Nonsense (Allele Frequency - 20.2%)  * Gene: FBXW7, Variant: Missense variant - LOF, Biologically Relevant, p.R505C (Allele Frequency - 23.3%)  * Gene: CCND1, Variant: Amplification, Copy number gain, Biologically Relevant  * Gene: FGF3, Variant: Amplification, Copy number gain, Biologically Relevant  * Gene: FGF4, Variant: Amplification, Copy number gain, Biologically Relevant  * Biomarker: Microsatellite Instability, Status: stable  * Biomarker: Tumor Mutation [place] (5.8 Muts/Mb, Percentile: 63.0)  * Disease associated genes/variants with no reportable findings:     * BRAF    * NRAS  * 7 variants of unknown significance    ** End of copied information **     SERUM TUMOR MARKER  CEA 4.4 - June 2023     CURRENT THERAPY  Dec 2023 progression- changed to FOLFIRI      PRIOR THERAPY  July 2023 - Dec 2023 FOLFOX      DEVICES  mediport     CURRENT PROBLEM LIST   ITP  - spleenectomy 2012   Graves ds  Metastatic colorectal cancer        Treatment History:    73 y.o.  male presenting to the emergency room with hematochezia in early April hemoglobin  low taken off Eliquis.  Bleeding initially resolved.  Anoscopy in May  2023 showed a large circumferential nonobstructing mass 7 cm in size at 9 cm from the anal verge.  Biopsy showed moderately differentiated adenocarcinoma.  MMR was proficient.  CEA was 4.7.  Imaging with Dr. Mccrary which showed lung and liver metastases.   PET scan confirmed FDG avidity of these lesions.          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  Social History:  Bobby reports that he has never smoked. He has never been exposed to tobacco smoke. He has never used smokeless tobacco. He reports current alcohol use of about 2.0 standard drinks of alcohol per week. He reports that he does not use drugs.  Family History:  No family history on file.  Family Oncology History:  Cancer-related family history is not on file.        SUBJECTIVE:    History of Present Illness:  Bobby Holland is a 73 y.o. male who was referred by Bobby Pickard MD  and presents with Follow-up    S/p 7 cycles FOLFIRI - here for assmt prior to C8  Overall feels well - denies any cough, sob, cp   No fevers or chills.   No diarrhea   Energy level is pretty good   Denies pain   No other concerns       OBJECTIVE:    VS / Pain:  /82 (BP Location: Left arm, Patient Position: Sitting, BP Cuff Size: Adult)   Pulse (!) 49   Temp 36.2 °C (97.2 °F) (Core)   Resp 18   Wt 92.9 kg (204 lb 12.9 oz)   SpO2 95%   BMI 27.17 kg/m²   BSA: 2.18 meters squared   Pain Scale: 0    Daily Weight  04/30/24 : 92.9 kg (204 lb 12.9 oz)  04/25/24 : 91.2 kg (201 lb 1 oz)  04/23/24 : 91.2 kg (200 lb 15.2 oz)  04/19/24 : 93.5 kg (206 lb 0.3 oz)  04/15/24 : 92.1 kg (203 lb)  04/11/24 : 91.3 kg (201 lb 2.7 oz)  04/09/24 : 90.4 kg (199 lb 3 oz)      Physical Exam  Constitutional:       Appearance: He is normal weight.   HENT:       Nose: Nose normal.      Mouth/Throat:      Mouth: Mucous membranes are moist.   Eyes:      Pupils: Pupils are equal, round, and reactive to light.   Cardiovascular:      Rate and Rhythm: Normal rate.      Pulses: Normal pulses.   Abdominal:      General: Abdomen is flat.   Musculoskeletal:         General: Normal range of motion.   Skin:     General: Skin is warm.   Neurological:      General: No focal deficit present.      Mental Status: He is alert.   Psychiatric:         Mood and Affect: Mood normal.         Judgment: Judgment normal.         Performance Status:   ECOG 1    Diagnostic Results         WBC   Date/Time Value Ref Range Status   04/19/2024 01:39 PM 6.5 4.4 - 11.3 x10*3/uL Final   04/05/2024 09:03 AM 6.5 4.4 - 11.3 x10*3/uL Final   03/27/2024 12:34 PM 9.6 4.4 - 11.3 x10*3/uL Final     Hemoglobin   Date Value Ref Range Status   04/19/2024 11.1 (L) 13.5 - 17.5 g/dL Final   04/05/2024 11.3 (L) 13.5 - 17.5 g/dL Final   03/27/2024 11.6 (L) 13.5 - 17.5 g/dL Final     MCV   Date/Time Value Ref Range Status   04/19/2024 01:39 PM 85 80 - 100 fL Final   04/05/2024 09:03 AM 83 80 - 100 fL Final   03/27/2024 12:34 PM 84 80 - 100 fL Final     Platelets   Date/Time Value Ref Range Status   04/19/2024 01:39  150 - 450 x10*3/uL Final   04/05/2024 09:03  (L) 150 - 450 x10*3/uL Final   03/27/2024 12:34  150 - 450 x10*3/uL Final     Neutrophils Absolute   Date/Time Value Ref Range Status   04/19/2024 01:39 PM 2.58 1.60 - 5.50 x10*3/uL Final     Comment:     Percent differential counts (%) should be interpreted in the context of the absolute cell counts (cells/uL).   04/05/2024 09:03 AM 3.24 1.60 - 5.50 x10*3/uL Final     Comment:     Percent differential counts (%) should be interpreted in the context of the absolute cell counts (cells/uL).   03/27/2024 12:34 PM 5.27 1.60 - 5.50 x10*3/uL Final     Comment:     Percent differential counts (%) should be interpreted in the context of the absolute cell  "counts (cells/uL).     Bilirubin, Total   Date/Time Value Ref Range Status   04/19/2024 01:39 PM 0.4 0.0 - 1.2 mg/dL Final   04/05/2024 09:03 AM 0.6 0.0 - 1.2 mg/dL Final   03/27/2024 12:34 PM 0.5 0.0 - 1.2 mg/dL Final     AST   Date/Time Value Ref Range Status   04/19/2024 01:39 PM 16 9 - 39 U/L Final   04/05/2024 09:03 AM 17 9 - 39 U/L Final   03/27/2024 12:34 PM 20 9 - 39 U/L Final     ALT   Date/Time Value Ref Range Status   04/19/2024 01:39 PM 14 10 - 52 U/L Final     Comment:     Patients treated with Sulfasalazine may generate falsely decreased results for ALT.   04/05/2024 09:03 AM 16 10 - 52 U/L Final     Comment:     Patients treated with Sulfasalazine may generate falsely decreased results for ALT.   03/27/2024 12:34 PM 16 10 - 52 U/L Final     Comment:     Patients treated with Sulfasalazine may generate falsely decreased results for ALT.     Creatinine   Date/Time Value Ref Range Status   04/19/2024 01:39 PM 0.73 0.50 - 1.30 mg/dL Final   04/05/2024 09:03 AM 0.68 0.50 - 1.30 mg/dL Final   03/27/2024 12:34 PM 0.77 0.50 - 1.30 mg/dL Final     Urea Nitrogen   Date/Time Value Ref Range Status   04/19/2024 01:39 PM 14 6 - 23 mg/dL Final   04/05/2024 09:03 AM 13 6 - 23 mg/dL Final   03/27/2024 12:34 PM 18 6 - 23 mg/dL Final     Albumin   Date/Time Value Ref Range Status   04/19/2024 01:39 PM 3.5 3.4 - 5.0 g/dL Final   04/05/2024 09:03 AM 3.4 3.4 - 5.0 g/dL Final   03/27/2024 12:34 PM 3.6 3.4 - 5.0 g/dL Final     No results found for: \"\"  Carcinoembryonic AG   Date/Time Value Ref Range Status   04/23/2024 08:19 AM 10.0 ug/L Final   03/27/2024 12:34 PM 12.4 ug/L Final   01/02/2024 09:13 AM 14.5 ug/L Final             Assessment/Plan     Assessment:    73 y.o. man with metastatic rectal cancer with liver and lung lesions diagnosed originally in 6/2023. Developed progressive disease in 12/2023.  He started FOLFIRI + Bevacizumab after that.      Plan:  Stage IV rectal Ca: s/p 7 cycles of FOLFIRI / Yohana   CEA " level decreasing   Plan for C8 next week - follow up with Dr. Pickard prior to C9 with scans     H/o ITP -     - s/p splencectomy   -   plt cutoff is 75,000 for chemotherapy   -receiving romiplostim every other week during chemo     - PLTs have been stable on this      [facility]/ [facility]  Office: [phone]  Fax: [phone]

## 2024-05-03 ENCOUNTER — APPOINTMENT (OUTPATIENT)
Dept: HEMATOLOGY/ONCOLOGY | Facility: CLINIC | Age: 74
End: 2024-05-03
Payer: MEDICARE

## 2024-05-03 ENCOUNTER — INFUSION (OUTPATIENT)
Dept: HEMATOLOGY/ONCOLOGY | Facility: CLINIC | Age: 74
End: 2024-05-03
Payer: MEDICARE

## 2024-05-03 VITALS
WEIGHT: 202.6 LBS | SYSTOLIC BLOOD PRESSURE: 154 MMHG | OXYGEN SATURATION: 95 % | TEMPERATURE: 98.4 F | BODY MASS INDEX: 26.88 KG/M2 | DIASTOLIC BLOOD PRESSURE: 88 MMHG | RESPIRATION RATE: 18 BRPM | HEART RATE: 61 BPM

## 2024-05-03 DIAGNOSIS — C20 MALIGNANT NEOPLASM OF RECTUM (MULTI): ICD-10-CM

## 2024-05-03 DIAGNOSIS — D69.3 IDIOPATHIC THROMBOCYTOPENIC PURPURA (ITP) (MULTI): ICD-10-CM

## 2024-05-03 LAB
ALBUMIN SERPL BCP-MCNC: 3.5 G/DL (ref 3.4–5)
ALP SERPL-CCNC: 84 U/L (ref 33–136)
ALT SERPL W P-5'-P-CCNC: 16 U/L (ref 10–52)
ANION GAP SERPL CALC-SCNC: 12 MMOL/L (ref 10–20)
AST SERPL W P-5'-P-CCNC: 18 U/L (ref 9–39)
BASOPHILS # BLD AUTO: 0.07 X10*3/UL (ref 0–0.1)
BASOPHILS NFR BLD AUTO: 1.1 %
BILIRUB SERPL-MCNC: 0.5 MG/DL (ref 0–1.2)
BUN SERPL-MCNC: 16 MG/DL (ref 6–23)
CALCIUM SERPL-MCNC: 9.6 MG/DL (ref 8.6–10.3)
CEA SERPL-MCNC: 6.6 UG/L
CHLORIDE SERPL-SCNC: 102 MMOL/L (ref 98–107)
CO2 SERPL-SCNC: 27 MMOL/L (ref 21–32)
CREAT SERPL-MCNC: 0.76 MG/DL (ref 0.5–1.3)
EGFRCR SERPLBLD CKD-EPI 2021: >90 ML/MIN/1.73M*2
EOSINOPHIL # BLD AUTO: 0.08 X10*3/UL (ref 0–0.4)
EOSINOPHIL NFR BLD AUTO: 1.2 %
ERYTHROCYTE [DISTWIDTH] IN BLOOD BY AUTOMATED COUNT: 17.1 % (ref 11.5–14.5)
GLUCOSE SERPL-MCNC: 101 MG/DL (ref 74–99)
HCT VFR BLD AUTO: 35.2 % (ref 41–52)
HGB BLD-MCNC: 11.2 G/DL (ref 13.5–17.5)
IMM GRANULOCYTES # BLD AUTO: 0.02 X10*3/UL (ref 0–0.5)
IMM GRANULOCYTES NFR BLD AUTO: 0.3 % (ref 0–0.9)
LYMPHOCYTES # BLD AUTO: 2.6 X10*3/UL (ref 0.8–3)
LYMPHOCYTES NFR BLD AUTO: 39.2 %
MCH RBC QN AUTO: 26.7 PG (ref 26–34)
MCHC RBC AUTO-ENTMCNC: 31.8 G/DL (ref 32–36)
MCV RBC AUTO: 84 FL (ref 80–100)
MONOCYTES # BLD AUTO: 0.86 X10*3/UL (ref 0.05–0.8)
MONOCYTES NFR BLD AUTO: 13 %
NEUTROPHILS # BLD AUTO: 3 X10*3/UL (ref 1.6–5.5)
NEUTROPHILS NFR BLD AUTO: 45.2 %
NRBC BLD-RTO: 0.3 /100 WBCS (ref 0–0)
PLATELET # BLD AUTO: 293 X10*3/UL (ref 150–450)
POTASSIUM SERPL-SCNC: 4 MMOL/L (ref 3.5–5.3)
PROT SERPL-MCNC: 7 G/DL (ref 6.4–8.2)
RBC # BLD AUTO: 4.19 X10*6/UL (ref 4.5–5.9)
SODIUM SERPL-SCNC: 137 MMOL/L (ref 136–145)
WBC # BLD AUTO: 6.6 X10*3/UL (ref 4.4–11.3)

## 2024-05-03 PROCEDURE — 84075 ASSAY ALKALINE PHOSPHATASE: CPT | Performed by: NURSE PRACTITIONER

## 2024-05-03 PROCEDURE — 2500000004 HC RX 250 GENERAL PHARMACY W/ HCPCS (ALT 636 FOR OP/ED): Performed by: INTERNAL MEDICINE

## 2024-05-03 PROCEDURE — 82378 CARCINOEMBRYONIC ANTIGEN: CPT | Performed by: NURSE PRACTITIONER

## 2024-05-03 PROCEDURE — 85025 COMPLETE CBC W/AUTO DIFF WBC: CPT | Performed by: NURSE PRACTITIONER

## 2024-05-03 PROCEDURE — 36591 DRAW BLOOD OFF VENOUS DEVICE: CPT

## 2024-05-03 RX ORDER — HEPARIN SODIUM,PORCINE/PF 10 UNIT/ML
50 SYRINGE (ML) INTRAVENOUS AS NEEDED
Status: CANCELLED | OUTPATIENT
Start: 2024-05-03

## 2024-05-03 RX ORDER — HEPARIN 100 UNIT/ML
500 SYRINGE INTRAVENOUS AS NEEDED
Status: DISCONTINUED | OUTPATIENT
Start: 2024-05-03 | End: 2024-05-03 | Stop reason: HOSPADM

## 2024-05-03 RX ORDER — HEPARIN SODIUM,PORCINE/PF 10 UNIT/ML
50 SYRINGE (ML) INTRAVENOUS AS NEEDED
Status: DISCONTINUED | OUTPATIENT
Start: 2024-05-03 | End: 2024-05-03 | Stop reason: HOSPADM

## 2024-05-03 RX ORDER — HEPARIN 100 UNIT/ML
500 SYRINGE INTRAVENOUS AS NEEDED
Status: CANCELLED | OUTPATIENT
Start: 2024-05-03

## 2024-05-03 RX ADMIN — SODIUM CHLORIDE, PRESERVATIVE FREE 500 UNITS: 5 INJECTION INTRAVENOUS at 14:38

## 2024-05-03 ASSESSMENT — PAIN SCALES - GENERAL: PAINLEVEL: 0-NO PAIN

## 2024-05-03 NOTE — PROGRESS NOTES
Secure chat sent to MD Pickard regarding NPLATE injection ordered for today. Patients platelet count is 293 today, last received folfiri on 4/23 and 90mcg of NPLATE on 4/19 with platelet of 353 at the time. Per MD Pickard patient is not to receive NPLATE injection today. Education provided to patient, patient reports understanding.   Reviewed AVS and schedule with patient. Discussed changing date of treatment scheduled for Thursday 5/23 due to the that fact that if patient keeps this appointment date he will need to have his pump disconnected Saturday 5/25 at [facility]. Discussed  the options of switching his treatment to Tuesday 5/21 at 1:30pm (same day as follow up appointment with MD Pickard) or Wednesday 5/22 at 7:30am/8am, however both of these appointments would be at [facility]. Patient is to discuss with Nina when he gets home- advised to call scheduling number at [phone] ASAP to secure chair spot for treatment. Unfortunately [facility] does not have any chair openings on 5/22.

## 2024-05-07 ENCOUNTER — INFUSION (OUTPATIENT)
Dept: HEMATOLOGY/ONCOLOGY | Facility: CLINIC | Age: 74
End: 2024-05-07
Payer: MEDICARE

## 2024-05-07 VITALS
OXYGEN SATURATION: 94 % | WEIGHT: 201.06 LBS | HEART RATE: 61 BPM | TEMPERATURE: 98.4 F | BODY MASS INDEX: 26.68 KG/M2 | RESPIRATION RATE: 18 BRPM | SYSTOLIC BLOOD PRESSURE: 156 MMHG | DIASTOLIC BLOOD PRESSURE: 87 MMHG

## 2024-05-07 DIAGNOSIS — C20 MALIGNANT NEOPLASM OF RECTUM (MULTI): Primary | ICD-10-CM

## 2024-05-07 PROCEDURE — 96411 CHEMO IV PUSH ADDL DRUG: CPT

## 2024-05-07 PROCEDURE — 2500000004 HC RX 250 GENERAL PHARMACY W/ HCPCS (ALT 636 FOR OP/ED): Performed by: NURSE PRACTITIONER

## 2024-05-07 PROCEDURE — 96375 TX/PRO/DX INJ NEW DRUG ADDON: CPT | Mod: INF

## 2024-05-07 PROCEDURE — 96413 CHEMO IV INFUSION 1 HR: CPT

## 2024-05-07 RX ORDER — HEPARIN SODIUM,PORCINE/PF 10 UNIT/ML
50 SYRINGE (ML) INTRAVENOUS AS NEEDED
Status: DISCONTINUED | OUTPATIENT
Start: 2024-05-07 | End: 2024-05-07 | Stop reason: HOSPADM

## 2024-05-07 RX ORDER — EPINEPHRINE 0.3 MG/.3ML
0.3 INJECTION SUBCUTANEOUS EVERY 5 MIN PRN
Status: DISCONTINUED | OUTPATIENT
Start: 2024-05-07 | End: 2024-05-07 | Stop reason: HOSPADM

## 2024-05-07 RX ORDER — ATROPINE SULFATE 0.4 MG/ML
0.4 INJECTION, SOLUTION ENDOTRACHEAL; INTRAMEDULLARY; INTRAMUSCULAR; INTRAVENOUS; SUBCUTANEOUS
Status: DISCONTINUED | OUTPATIENT
Start: 2024-05-07 | End: 2024-05-07 | Stop reason: HOSPADM

## 2024-05-07 RX ORDER — ALBUTEROL SULFATE 0.83 MG/ML
3 SOLUTION RESPIRATORY (INHALATION) AS NEEDED
Status: DISCONTINUED | OUTPATIENT
Start: 2024-05-07 | End: 2024-05-07 | Stop reason: HOSPADM

## 2024-05-07 RX ORDER — PROCHLORPERAZINE MALEATE 10 MG
10 TABLET ORAL EVERY 6 HOURS PRN
Status: DISCONTINUED | OUTPATIENT
Start: 2024-05-07 | End: 2024-05-07 | Stop reason: HOSPADM

## 2024-05-07 RX ORDER — PROCHLORPERAZINE EDISYLATE 5 MG/ML
10 INJECTION INTRAMUSCULAR; INTRAVENOUS EVERY 6 HOURS PRN
Status: DISCONTINUED | OUTPATIENT
Start: 2024-05-07 | End: 2024-05-07 | Stop reason: HOSPADM

## 2024-05-07 RX ORDER — HEPARIN 100 UNIT/ML
500 SYRINGE INTRAVENOUS AS NEEDED
Status: DISCONTINUED | OUTPATIENT
Start: 2024-05-07 | End: 2024-05-07 | Stop reason: HOSPADM

## 2024-05-07 RX ORDER — FAMOTIDINE 10 MG/ML
20 INJECTION INTRAVENOUS ONCE AS NEEDED
Status: DISCONTINUED | OUTPATIENT
Start: 2024-05-07 | End: 2024-05-07 | Stop reason: HOSPADM

## 2024-05-07 RX ORDER — DIPHENHYDRAMINE HYDROCHLORIDE 50 MG/ML
50 INJECTION INTRAMUSCULAR; INTRAVENOUS AS NEEDED
Status: DISCONTINUED | OUTPATIENT
Start: 2024-05-07 | End: 2024-05-07 | Stop reason: HOSPADM

## 2024-05-07 RX ORDER — HEPARIN 100 UNIT/ML
500 SYRINGE INTRAVENOUS AS NEEDED
Status: CANCELLED | OUTPATIENT
Start: 2024-05-07

## 2024-05-07 RX ORDER — PALONOSETRON 0.05 MG/ML
0.25 INJECTION, SOLUTION INTRAVENOUS ONCE
Status: COMPLETED | OUTPATIENT
Start: 2024-05-07 | End: 2024-05-07

## 2024-05-07 RX ORDER — HEPARIN SODIUM,PORCINE/PF 10 UNIT/ML
50 SYRINGE (ML) INTRAVENOUS AS NEEDED
Status: CANCELLED | OUTPATIENT
Start: 2024-05-07

## 2024-05-07 RX ADMIN — IRINOTECAN HYDROCHLORIDE 400 MG: 20 INJECTION, SOLUTION INTRAVENOUS at 13:49

## 2024-05-07 RX ADMIN — PALONOSETRON HYDROCHLORIDE 250 MCG: 0.25 INJECTION INTRAVENOUS at 13:13

## 2024-05-07 RX ADMIN — ATROPINE SULFATE 0.4 MG: 0.4 INJECTION, SOLUTION INTRAVENOUS at 13:13

## 2024-05-07 RX ADMIN — BEVACIZUMAB-AWWB 500 MG: 400 INJECTION, SOLUTION INTRAVENOUS at 13:34

## 2024-05-07 RX ADMIN — FLUOROURACIL 5350 MG: 50 INJECTION, SOLUTION INTRAVENOUS at 15:26

## 2024-05-07 RX ADMIN — DEXAMETHASONE SODIUM PHOSPHATE 12 MG: 10 INJECTION, SOLUTION INTRAMUSCULAR; INTRAVENOUS at 13:13

## 2024-05-07 ASSESSMENT — PAIN SCALES - GENERAL: PAINLEVEL: 0-NO PAIN

## 2024-05-09 ENCOUNTER — INFUSION (OUTPATIENT)
Dept: HEMATOLOGY/ONCOLOGY | Facility: CLINIC | Age: 74
End: 2024-05-09
Payer: MEDICARE

## 2024-05-09 VITALS
WEIGHT: 198.3 LBS | HEART RATE: 55 BPM | TEMPERATURE: 97.7 F | SYSTOLIC BLOOD PRESSURE: 138 MMHG | OXYGEN SATURATION: 97 % | DIASTOLIC BLOOD PRESSURE: 81 MMHG | BODY MASS INDEX: 26.31 KG/M2 | RESPIRATION RATE: 19 BRPM

## 2024-05-09 DIAGNOSIS — C20 MALIGNANT NEOPLASM OF RECTUM (MULTI): ICD-10-CM

## 2024-05-09 PROCEDURE — 2500000004 HC RX 250 GENERAL PHARMACY W/ HCPCS (ALT 636 FOR OP/ED): Performed by: INTERNAL MEDICINE

## 2024-05-09 PROCEDURE — 96523 IRRIG DRUG DELIVERY DEVICE: CPT

## 2024-05-09 RX ORDER — HEPARIN 100 UNIT/ML
500 SYRINGE INTRAVENOUS AS NEEDED
Status: CANCELLED | OUTPATIENT
Start: 2024-05-09

## 2024-05-09 RX ORDER — HEPARIN SODIUM,PORCINE/PF 10 UNIT/ML
50 SYRINGE (ML) INTRAVENOUS AS NEEDED
Status: DISCONTINUED | OUTPATIENT
Start: 2024-05-09 | End: 2024-05-09 | Stop reason: HOSPADM

## 2024-05-09 RX ORDER — HEPARIN SODIUM,PORCINE/PF 10 UNIT/ML
50 SYRINGE (ML) INTRAVENOUS AS NEEDED
Status: CANCELLED | OUTPATIENT
Start: 2024-05-09

## 2024-05-09 RX ORDER — HEPARIN 100 UNIT/ML
500 SYRINGE INTRAVENOUS AS NEEDED
Status: DISCONTINUED | OUTPATIENT
Start: 2024-05-09 | End: 2024-05-09 | Stop reason: HOSPADM

## 2024-05-09 RX ADMIN — SODIUM CHLORIDE, PRESERVATIVE FREE 500 UNITS: 5 INJECTION INTRAVENOUS at 14:43

## 2024-05-09 ASSESSMENT — PAIN SCALES - GENERAL: PAINLEVEL: 0-NO PAIN

## 2024-05-10 ENCOUNTER — APPOINTMENT (OUTPATIENT)
Dept: HEMATOLOGY/ONCOLOGY | Facility: CLINIC | Age: 74
End: 2024-05-10
Payer: MEDICARE

## 2024-05-16 ENCOUNTER — HOSPITAL ENCOUNTER (OUTPATIENT)
Dept: RADIOLOGY | Facility: HOSPITAL | Age: 74
Discharge: HOME | End: 2024-05-16
Payer: MEDICARE

## 2024-05-16 DIAGNOSIS — C19 COLORECTAL CANCER (MULTI): ICD-10-CM

## 2024-05-16 PROCEDURE — 74177 CT ABD & PELVIS W/CONTRAST: CPT | Performed by: STUDENT IN AN ORGANIZED HEALTH CARE EDUCATION/TRAINING PROGRAM

## 2024-05-16 PROCEDURE — 2550000001 HC RX 255 CONTRASTS: Performed by: NURSE PRACTITIONER

## 2024-05-16 PROCEDURE — 71260 CT THORAX DX C+: CPT | Performed by: STUDENT IN AN ORGANIZED HEALTH CARE EDUCATION/TRAINING PROGRAM

## 2024-05-16 PROCEDURE — 74177 CT ABD & PELVIS W/CONTRAST: CPT

## 2024-05-16 RX ADMIN — IOHEXOL 75 ML: 350 INJECTION, SOLUTION INTRAVENOUS at 10:59

## 2024-05-17 ENCOUNTER — APPOINTMENT (OUTPATIENT)
Dept: HEMATOLOGY/ONCOLOGY | Facility: CLINIC | Age: 74
End: 2024-05-17
Payer: MEDICARE

## 2024-05-17 ENCOUNTER — INFUSION (OUTPATIENT)
Dept: HEMATOLOGY/ONCOLOGY | Facility: CLINIC | Age: 74
End: 2024-05-17
Payer: MEDICARE

## 2024-05-17 VITALS
SYSTOLIC BLOOD PRESSURE: 135 MMHG | BODY MASS INDEX: 26.3 KG/M2 | OXYGEN SATURATION: 96 % | TEMPERATURE: 96.6 F | RESPIRATION RATE: 18 BRPM | WEIGHT: 198.19 LBS | HEART RATE: 57 BPM | DIASTOLIC BLOOD PRESSURE: 84 MMHG

## 2024-05-17 DIAGNOSIS — D69.3 IDIOPATHIC THROMBOCYTOPENIC PURPURA (ITP) (MULTI): ICD-10-CM

## 2024-05-17 DIAGNOSIS — E05.00 GRAVES DISEASE: ICD-10-CM

## 2024-05-17 DIAGNOSIS — C20 MALIGNANT NEOPLASM OF RECTUM (MULTI): ICD-10-CM

## 2024-05-17 LAB
ALBUMIN SERPL BCP-MCNC: 3.5 G/DL (ref 3.4–5)
ALP SERPL-CCNC: 96 U/L (ref 33–136)
ALT SERPL W P-5'-P-CCNC: 17 U/L (ref 10–52)
ANION GAP SERPL CALC-SCNC: 12 MMOL/L (ref 10–20)
APPEARANCE UR: CLEAR
AST SERPL W P-5'-P-CCNC: 18 U/L (ref 9–39)
BASOPHILS # BLD AUTO: 0.03 X10*3/UL (ref 0–0.1)
BASOPHILS NFR BLD AUTO: 0.4 %
BILIRUB SERPL-MCNC: 0.5 MG/DL (ref 0–1.2)
BILIRUB UR STRIP.AUTO-MCNC: NEGATIVE MG/DL
BUN SERPL-MCNC: 13 MG/DL (ref 6–23)
CALCIUM SERPL-MCNC: 9.6 MG/DL (ref 8.6–10.3)
CEA SERPL-MCNC: 7.1 UG/L
CHLORIDE SERPL-SCNC: 100 MMOL/L (ref 98–107)
CO2 SERPL-SCNC: 28 MMOL/L (ref 21–32)
COLOR UR: YELLOW
CREAT SERPL-MCNC: 0.81 MG/DL (ref 0.5–1.3)
EGFRCR SERPLBLD CKD-EPI 2021: >90 ML/MIN/1.73M*2
EOSINOPHIL # BLD AUTO: 0.1 X10*3/UL (ref 0–0.4)
EOSINOPHIL NFR BLD AUTO: 1.4 %
ERYTHROCYTE [DISTWIDTH] IN BLOOD BY AUTOMATED COUNT: 17.2 % (ref 11.5–14.5)
GLUCOSE SERPL-MCNC: 103 MG/DL (ref 74–99)
GLUCOSE UR STRIP.AUTO-MCNC: NORMAL MG/DL
HCT VFR BLD AUTO: 35.3 % (ref 41–52)
HGB BLD-MCNC: 11.6 G/DL (ref 13.5–17.5)
IMM GRANULOCYTES # BLD AUTO: 0.02 X10*3/UL (ref 0–0.5)
IMM GRANULOCYTES NFR BLD AUTO: 0.3 % (ref 0–0.9)
KETONES UR STRIP.AUTO-MCNC: NEGATIVE MG/DL
LEUKOCYTE ESTERASE UR QL STRIP.AUTO: NEGATIVE
LYMPHOCYTES # BLD AUTO: 2.72 X10*3/UL (ref 0.8–3)
LYMPHOCYTES NFR BLD AUTO: 37.9 %
MCH RBC QN AUTO: 27.4 PG (ref 26–34)
MCHC RBC AUTO-ENTMCNC: 32.9 G/DL (ref 32–36)
MCV RBC AUTO: 83 FL (ref 80–100)
MONOCYTES # BLD AUTO: 1.04 X10*3/UL (ref 0.05–0.8)
MONOCYTES NFR BLD AUTO: 14.5 %
NEUTROPHILS # BLD AUTO: 3.27 X10*3/UL (ref 1.6–5.5)
NEUTROPHILS NFR BLD AUTO: 45.5 %
NITRITE UR QL STRIP.AUTO: NEGATIVE
NRBC BLD-RTO: 0 /100 WBCS (ref 0–0)
PH UR STRIP.AUTO: 5.5 [PH]
PLATELET # BLD AUTO: 190 X10*3/UL (ref 150–450)
POTASSIUM SERPL-SCNC: 3.9 MMOL/L (ref 3.5–5.3)
PROT SERPL-MCNC: 7.1 G/DL (ref 6.4–8.2)
PROT UR STRIP.AUTO-MCNC: NEGATIVE MG/DL
RBC # BLD AUTO: 4.24 X10*6/UL (ref 4.5–5.9)
RBC # UR STRIP.AUTO: NEGATIVE /UL
SODIUM SERPL-SCNC: 136 MMOL/L (ref 136–145)
SP GR UR STRIP.AUTO: 1.02
UROBILINOGEN UR STRIP.AUTO-MCNC: NORMAL MG/DL
WBC # BLD AUTO: 7.2 X10*3/UL (ref 4.4–11.3)

## 2024-05-17 PROCEDURE — 2500000004 HC RX 250 GENERAL PHARMACY W/ HCPCS (ALT 636 FOR OP/ED): Mod: JW,JG | Performed by: INTERNAL MEDICINE

## 2024-05-17 PROCEDURE — 80053 COMPREHEN METABOLIC PANEL: CPT | Performed by: NURSE PRACTITIONER

## 2024-05-17 PROCEDURE — 96372 THER/PROPH/DIAG INJ SC/IM: CPT

## 2024-05-17 PROCEDURE — 82378 CARCINOEMBRYONIC ANTIGEN: CPT | Performed by: NURSE PRACTITIONER

## 2024-05-17 PROCEDURE — 81003 URINALYSIS AUTO W/O SCOPE: CPT | Performed by: NURSE PRACTITIONER

## 2024-05-17 PROCEDURE — 85025 COMPLETE CBC W/AUTO DIFF WBC: CPT | Performed by: NURSE PRACTITIONER

## 2024-05-17 RX ORDER — HEPARIN 100 UNIT/ML
500 SYRINGE INTRAVENOUS AS NEEDED
Status: CANCELLED | OUTPATIENT
Start: 2024-05-17

## 2024-05-17 RX ORDER — EPINEPHRINE 0.3 MG/.3ML
0.3 INJECTION SUBCUTANEOUS EVERY 5 MIN PRN
OUTPATIENT
Start: 2024-05-31

## 2024-05-17 RX ORDER — FAMOTIDINE 10 MG/ML
20 INJECTION INTRAVENOUS ONCE AS NEEDED
Status: DISCONTINUED | OUTPATIENT
Start: 2024-05-17 | End: 2024-05-17 | Stop reason: HOSPADM

## 2024-05-17 RX ORDER — ALBUTEROL SULFATE 0.83 MG/ML
3 SOLUTION RESPIRATORY (INHALATION) AS NEEDED
Status: DISCONTINUED | OUTPATIENT
Start: 2024-05-17 | End: 2024-05-17 | Stop reason: HOSPADM

## 2024-05-17 RX ORDER — HEPARIN SODIUM,PORCINE/PF 10 UNIT/ML
50 SYRINGE (ML) INTRAVENOUS AS NEEDED
Status: DISCONTINUED | OUTPATIENT
Start: 2024-05-17 | End: 2024-05-17 | Stop reason: HOSPADM

## 2024-05-17 RX ORDER — ALBUTEROL SULFATE 0.83 MG/ML
3 SOLUTION RESPIRATORY (INHALATION) AS NEEDED
OUTPATIENT
Start: 2024-05-31

## 2024-05-17 RX ORDER — FAMOTIDINE 10 MG/ML
20 INJECTION INTRAVENOUS ONCE AS NEEDED
OUTPATIENT
Start: 2024-05-31

## 2024-05-17 RX ORDER — DIPHENHYDRAMINE HYDROCHLORIDE 50 MG/ML
50 INJECTION INTRAMUSCULAR; INTRAVENOUS AS NEEDED
Status: DISCONTINUED | OUTPATIENT
Start: 2024-05-17 | End: 2024-05-17 | Stop reason: HOSPADM

## 2024-05-17 RX ORDER — HEPARIN SODIUM,PORCINE/PF 10 UNIT/ML
50 SYRINGE (ML) INTRAVENOUS AS NEEDED
Status: CANCELLED | OUTPATIENT
Start: 2024-05-17

## 2024-05-17 RX ORDER — EPINEPHRINE 0.3 MG/.3ML
0.3 INJECTION SUBCUTANEOUS EVERY 5 MIN PRN
Status: DISCONTINUED | OUTPATIENT
Start: 2024-05-17 | End: 2024-05-17 | Stop reason: HOSPADM

## 2024-05-17 RX ORDER — HEPARIN 100 UNIT/ML
500 SYRINGE INTRAVENOUS AS NEEDED
Status: DISCONTINUED | OUTPATIENT
Start: 2024-05-17 | End: 2024-05-17 | Stop reason: HOSPADM

## 2024-05-17 RX ORDER — DIPHENHYDRAMINE HYDROCHLORIDE 50 MG/ML
50 INJECTION INTRAMUSCULAR; INTRAVENOUS AS NEEDED
OUTPATIENT
Start: 2024-05-31

## 2024-05-17 RX ADMIN — ROMIPLOSTIM 90 MCG: 125 INJECTION, POWDER, LYOPHILIZED, FOR SOLUTION SUBCUTANEOUS at 14:46

## 2024-05-17 RX ADMIN — HEPARIN 500 UNITS: 100 SYRINGE at 14:46

## 2024-05-17 ASSESSMENT — PAIN SCALES - GENERAL: PAINLEVEL: 0-NO PAIN

## 2024-05-20 NOTE — PROGRESS NOTES
Patient ID: Bobby Holland is a 73 y.o. male from [place], [place].     Referring Physician: Bobby Pickard MD  [id number] [place] [address]    Primary Care Provider: Bobby Pickard MD    Cancer History:   Treatment Synopsis:    DIAGNOSIS  Rectal Cancer 5/2023     STAGING  [id number]     CURRENT SITES OF DISEASE  Rectum, liver and lung     MOLECULAR GENOMICS    Test Name: [place] xT (XT.V4)  Laboratory Name: [facility]  Specimen Source: Colon, rectosigmoid  Collection Date: 15-May-2023  Cancer Type: Invasive adenocarcinoma  Report Id: [id number]    Molecular Findings:  * Gene: KRAS, Variant: Missense variant (exon 2) - GOF, Potentially Actionable, p.G12V (Allele Frequency - 16.6%)  * Gene: APC, Variant: Frameshift - LOF, Biologically Relevant, p.[id number] (Allele Frequency - 26.6%)  * Gene: APC, Variant: Stop gain - LOF, Biologically Relevant, p.R213*, Nonsense (Allele Frequency - 20.2%)  * Gene: FBXW7, Variant: Missense variant - LOF, Biologically Relevant, p.R505C (Allele Frequency - 23.3%)  * Gene: CCND1, Variant: Amplification, Copy number gain, Biologically Relevant  * Gene: FGF3, Variant: Amplification, Copy number gain, Biologically Relevant  * Gene: FGF4, Variant: Amplification, Copy number gain, Biologically Relevant  * Biomarker: Microsatellite Instability, Status: stable  * Biomarker: Tumor Mutation [place] (5.8 Muts/Mb, Percentile: 63.0)  * Disease associated genes/variants with no reportable findings:     * BRAF    * NRAS  * 7 variants of unknown significance    ** End of copied information **     SERUM TUMOR MARKER  CEA 4.4 - June 2023     CURRENT THERAPY  Dec 2023 progression- changed to FOLFIRI      PRIOR THERAPY  July 2023 - Dec 2023 FOLFOX      DEVICES  mediport     CURRENT PROBLEM LIST   ITP  - spleenectomy 2012   Graves ds  Metastatic colorectal cancer        Treatment History:    73 y.o.  male presenting to the emergency room with hematochezia in early April hemoglobin low taken off  Eliquis.  Bleeding initially resolved.  Anoscopy in May  2023 showed a large circumferential nonobstructing mass 7 cm in size at 9 cm from the anal verge.  Biopsy showed moderately differentiated adenocarcinoma.  MMR was proficient.  CEA was 4.7.  Imaging with Dr. Mccrary which showed lung and liver metastases.   PET scan confirmed FDG avidity of these lesions.          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  Social History:  Bobby reports that he has never smoked. He has never been exposed to tobacco smoke. He has never used smokeless tobacco. He reports current alcohol use of about 2.0 standard drinks of alcohol per week. He reports that he does not use drugs.  Family History:  No family history on file.  Family Oncology History:  Cancer-related family history is not on file.        SUBJECTIVE:    History of Present Illness:  Bobby Holland is a 73 y.o. male who was referred by Bobby Pickard MD  and presents with No chief complaint on file.    S/p 8 cycles FOLFIRI - here for assmt prior to C9  Overall feels well - denies any cough, sob, cp   No fevers or chills.   No diarrhea   Energy level is pretty good   Denies pain   No other concerns       OBJECTIVE:    VS / Pain:  /75   Pulse 56   Temp 35.5 °C (95.9 °F)   Resp 18   Wt 91 kg (200 lb 9.9 oz)   SpO2 94%   BMI 26.62 kg/m²   BSA: There is no height or weight on file to calculate BSA.   Pain Scale: 0    Daily Weight  05/21/24 : 91 kg (200 lb 9.9 oz)  05/17/24 : 89.9 kg (198 lb 3.1 oz)  05/09/24 : 89.9 kg (198 lb 4.9 oz)  05/07/24 : 91.2 kg (201 lb 1 oz)  05/03/24 : 91.9 kg (202 lb 9.6 oz)  04/30/24 : 92.9 kg (204 lb 12.9 oz)  04/25/24 : 91.2 kg (201 lb 1 oz)      Physical Exam  Constitutional:       Appearance: He is normal weight.   HENT:      Nose: Nose normal.      Mouth/Throat:       Mouth: Mucous membranes are moist.   Eyes:      Pupils: Pupils are equal, round, and reactive to light.   Cardiovascular:      Rate and Rhythm: Normal rate.      Pulses: Normal pulses.   Abdominal:      General: Abdomen is flat.   Musculoskeletal:         General: Normal range of motion.   Skin:     General: Skin is warm.   Neurological:      General: No focal deficit present.      Mental Status: He is alert.   Psychiatric:         Mood and Affect: Mood normal.         Judgment: Judgment normal.       Performance Status:   ECOG 1    Diagnostic Results         WBC   Date/Time Value Ref Range Status   05/17/2024 02:05 PM 7.2 4.4 - 11.3 x10*3/uL Final   05/03/2024 02:46 PM 6.6 4.4 - 11.3 x10*3/uL Final   04/19/2024 01:39 PM 6.5 4.4 - 11.3 x10*3/uL Final     Hemoglobin   Date Value Ref Range Status   05/17/2024 11.6 (L) 13.5 - 17.5 g/dL Final   05/03/2024 11.2 (L) 13.5 - 17.5 g/dL Final   04/19/2024 11.1 (L) 13.5 - 17.5 g/dL Final     MCV   Date/Time Value Ref Range Status   05/17/2024 02:05 PM 83 80 - 100 fL Final   05/03/2024 02:46 PM 84 80 - 100 fL Final   04/19/2024 01:39 PM 85 80 - 100 fL Final     Platelets   Date/Time Value Ref Range Status   05/17/2024 02:05  150 - 450 x10*3/uL Final   05/03/2024 02:46  150 - 450 x10*3/uL Final   04/19/2024 01:39  150 - 450 x10*3/uL Final     Neutrophils Absolute   Date/Time Value Ref Range Status   05/17/2024 02:05 PM 3.27 1.60 - 5.50 x10*3/uL Final     Comment:     Percent differential counts (%) should be interpreted in the context of the absolute cell counts (cells/uL).   05/03/2024 02:46 PM 3.00 1.60 - 5.50 x10*3/uL Final     Comment:     Percent differential counts (%) should be interpreted in the context of the absolute cell counts (cells/uL).   04/19/2024 01:39 PM 2.58 1.60 - 5.50 x10*3/uL Final     Comment:     Percent differential counts (%) should be interpreted in the context of the absolute cell counts (cells/uL).     Bilirubin, Total  "  Date/Time Value Ref Range Status   05/17/2024 02:05 PM 0.5 0.0 - 1.2 mg/dL Final   05/03/2024 02:46 PM 0.5 0.0 - 1.2 mg/dL Final   04/19/2024 01:39 PM 0.4 0.0 - 1.2 mg/dL Final     AST   Date/Time Value Ref Range Status   05/17/2024 02:05 PM 18 9 - 39 U/L Final   05/03/2024 02:46 PM 18 9 - 39 U/L Final   04/19/2024 01:39 PM 16 9 - 39 U/L Final     ALT   Date/Time Value Ref Range Status   05/17/2024 02:05 PM 17 10 - 52 U/L Final     Comment:     Patients treated with Sulfasalazine may generate falsely decreased results for ALT.   05/03/2024 02:46 PM 16 10 - 52 U/L Final     Comment:     Patients treated with Sulfasalazine may generate falsely decreased results for ALT.   04/19/2024 01:39 PM 14 10 - 52 U/L Final     Comment:     Patients treated with Sulfasalazine may generate falsely decreased results for ALT.     Creatinine   Date/Time Value Ref Range Status   05/17/2024 02:05 PM 0.81 0.50 - 1.30 mg/dL Final   05/03/2024 02:46 PM 0.76 0.50 - 1.30 mg/dL Final   04/19/2024 01:39 PM 0.73 0.50 - 1.30 mg/dL Final     Urea Nitrogen   Date/Time Value Ref Range Status   05/17/2024 02:05 PM 13 6 - 23 mg/dL Final   05/03/2024 02:46 PM 16 6 - 23 mg/dL Final   04/19/2024 01:39 PM 14 6 - 23 mg/dL Final     Albumin   Date/Time Value Ref Range Status   05/17/2024 02:05 PM 3.5 3.4 - 5.0 g/dL Final   05/03/2024 02:46 PM 3.5 3.4 - 5.0 g/dL Final   04/19/2024 01:39 PM 3.5 3.4 - 5.0 g/dL Final     No results found for: \"\"  Carcinoembryonic AG   Date/Time Value Ref Range Status   05/17/2024 02:05 PM 7.1 ug/L Final   05/03/2024 02:46 PM 6.6 ug/L Final   04/23/2024 08:19 AM 10.0 ug/L Final     === 05/16/24 ===    CT CHEST ABDOMEN PELVIS W IV CONTRAST    - Impression -  CHEST:  1.  Stable pulmonary nodules and right lower paratracheal lymph node  as described.  2. Moderate coronary artery calcifications.    ABDOMEN-PELVIS:  1.  Stable appearance of circumferential masslike wall thickening of  the superior rectum.  2. Stable " appearance of numerous hypoattenuating lesions throughout  the liver.  3. Stable appearance of right inguinal hernia containing multiple  loops of bowel without CT evidence of obstruction.      MACRO:  None    Signed by: Rich Medina 5/18/2024 5:12 PM  Dictation workstation:   [id number]    Assessment/Plan     Assessment:    73 y.o. man with metastatic rectal cancer with liver and lung lesions diagnosed originally in 6/2023. Developed progressive disease in 12/2023.  He started FOLFIRI + Bevacizumab after that.      Plan:  Stage IV rectal Ca: s/p 8 cycles of FOLFIRI / Yohana   CEA level decreasing   Plan for C9 this week    H/o ITP -     - s/p splencectomy   -   plt cutoff is 75,000 for chemotherapy   -receiving romiplostim every other week during chemo     - PLTs have been stable on this      Bobby Pickard MD  [facility] [place]  Office: [phone]  Fax: [phone]

## 2024-05-21 ENCOUNTER — OFFICE VISIT (OUTPATIENT)
Dept: HEMATOLOGY/ONCOLOGY | Facility: CLINIC | Age: 74
End: 2024-05-21
Payer: MEDICARE

## 2024-05-21 ENCOUNTER — INFUSION (OUTPATIENT)
Dept: HEMATOLOGY/ONCOLOGY | Facility: CLINIC | Age: 74
End: 2024-05-21
Payer: MEDICARE

## 2024-05-21 ENCOUNTER — APPOINTMENT (OUTPATIENT)
Dept: HEMATOLOGY/ONCOLOGY | Facility: CLINIC | Age: 74
End: 2024-05-21
Payer: MEDICARE

## 2024-05-21 VITALS
DIASTOLIC BLOOD PRESSURE: 74 MMHG | OXYGEN SATURATION: 96 % | BODY MASS INDEX: 26.25 KG/M2 | TEMPERATURE: 98.4 F | SYSTOLIC BLOOD PRESSURE: 119 MMHG | HEART RATE: 62 BPM | RESPIRATION RATE: 18 BRPM | WEIGHT: 197.86 LBS

## 2024-05-21 VITALS
WEIGHT: 200.62 LBS | DIASTOLIC BLOOD PRESSURE: 75 MMHG | OXYGEN SATURATION: 94 % | TEMPERATURE: 95.9 F | SYSTOLIC BLOOD PRESSURE: 145 MMHG | HEART RATE: 56 BPM | BODY MASS INDEX: 26.62 KG/M2 | RESPIRATION RATE: 18 BRPM

## 2024-05-21 DIAGNOSIS — C20 MALIGNANT NEOPLASM OF RECTUM (MULTI): ICD-10-CM

## 2024-05-21 DIAGNOSIS — C20 MALIGNANT NEOPLASM OF RECTUM (MULTI): Primary | ICD-10-CM

## 2024-05-21 PROCEDURE — 1126F AMNT PAIN NOTED NONE PRSNT: CPT | Performed by: INTERNAL MEDICINE

## 2024-05-21 PROCEDURE — 1159F MED LIST DOCD IN RCRD: CPT | Performed by: INTERNAL MEDICINE

## 2024-05-21 PROCEDURE — 3077F SYST BP >= 140 MM HG: CPT | Performed by: INTERNAL MEDICINE

## 2024-05-21 PROCEDURE — 96375 TX/PRO/DX INJ NEW DRUG ADDON: CPT | Mod: INF

## 2024-05-21 PROCEDURE — 2500000004 HC RX 250 GENERAL PHARMACY W/ HCPCS (ALT 636 FOR OP/ED): Performed by: INTERNAL MEDICINE

## 2024-05-21 PROCEDURE — 96413 CHEMO IV INFUSION 1 HR: CPT

## 2024-05-21 PROCEDURE — 1160F RVW MEDS BY RX/DR IN RCRD: CPT | Performed by: INTERNAL MEDICINE

## 2024-05-21 PROCEDURE — 96411 CHEMO IV PUSH ADDL DRUG: CPT

## 2024-05-21 PROCEDURE — 99214 OFFICE O/P EST MOD 30 MIN: CPT | Performed by: INTERNAL MEDICINE

## 2024-05-21 PROCEDURE — 3078F DIAST BP <80 MM HG: CPT | Performed by: INTERNAL MEDICINE

## 2024-05-21 PROCEDURE — 99214 OFFICE O/P EST MOD 30 MIN: CPT | Mod: 25 | Performed by: INTERNAL MEDICINE

## 2024-05-21 RX ORDER — HEPARIN SODIUM,PORCINE/PF 10 UNIT/ML
50 SYRINGE (ML) INTRAVENOUS AS NEEDED
Status: DISCONTINUED | OUTPATIENT
Start: 2024-05-21 | End: 2024-05-21 | Stop reason: HOSPADM

## 2024-05-21 RX ORDER — FAMOTIDINE 10 MG/ML
20 INJECTION INTRAVENOUS ONCE AS NEEDED
OUTPATIENT
Start: 2024-06-18

## 2024-05-21 RX ORDER — FAMOTIDINE 10 MG/ML
20 INJECTION INTRAVENOUS ONCE AS NEEDED
Status: CANCELLED | OUTPATIENT
Start: 2024-05-21

## 2024-05-21 RX ORDER — ATROPINE SULFATE 0.4 MG/ML
0.4 INJECTION, SOLUTION ENDOTRACHEAL; INTRAMEDULLARY; INTRAMUSCULAR; INTRAVENOUS; SUBCUTANEOUS
Status: CANCELLED | OUTPATIENT
Start: 2024-05-21

## 2024-05-21 RX ORDER — PALONOSETRON 0.05 MG/ML
0.25 INJECTION, SOLUTION INTRAVENOUS ONCE
OUTPATIENT
Start: 2024-06-18

## 2024-05-21 RX ORDER — HEPARIN 100 UNIT/ML
500 SYRINGE INTRAVENOUS AS NEEDED
Status: DISCONTINUED | OUTPATIENT
Start: 2024-05-21 | End: 2024-05-21 | Stop reason: HOSPADM

## 2024-05-21 RX ORDER — ALBUTEROL SULFATE 0.83 MG/ML
3 SOLUTION RESPIRATORY (INHALATION) AS NEEDED
Status: DISCONTINUED | OUTPATIENT
Start: 2024-05-21 | End: 2024-05-21 | Stop reason: HOSPADM

## 2024-05-21 RX ORDER — ATROPINE SULFATE 0.4 MG/ML
0.4 INJECTION, SOLUTION ENDOTRACHEAL; INTRAMEDULLARY; INTRAMUSCULAR; INTRAVENOUS; SUBCUTANEOUS
OUTPATIENT
Start: 2024-06-18

## 2024-05-21 RX ORDER — HEPARIN 100 UNIT/ML
500 SYRINGE INTRAVENOUS AS NEEDED
Status: CANCELLED | OUTPATIENT
Start: 2024-05-21

## 2024-05-21 RX ORDER — ATROPINE SULFATE 0.4 MG/ML
0.4 INJECTION, SOLUTION ENDOTRACHEAL; INTRAMEDULLARY; INTRAMUSCULAR; INTRAVENOUS; SUBCUTANEOUS
Status: DISCONTINUED | OUTPATIENT
Start: 2024-05-21 | End: 2024-05-21 | Stop reason: HOSPADM

## 2024-05-21 RX ORDER — EPINEPHRINE 0.3 MG/.3ML
0.3 INJECTION SUBCUTANEOUS EVERY 5 MIN PRN
OUTPATIENT
Start: 2024-06-18

## 2024-05-21 RX ORDER — PROCHLORPERAZINE EDISYLATE 5 MG/ML
10 INJECTION INTRAMUSCULAR; INTRAVENOUS EVERY 6 HOURS PRN
Status: DISCONTINUED | OUTPATIENT
Start: 2024-05-21 | End: 2024-05-21 | Stop reason: HOSPADM

## 2024-05-21 RX ORDER — ALBUTEROL SULFATE 0.83 MG/ML
3 SOLUTION RESPIRATORY (INHALATION) AS NEEDED
OUTPATIENT
Start: 2024-06-18

## 2024-05-21 RX ORDER — EPINEPHRINE 0.3 MG/.3ML
0.3 INJECTION SUBCUTANEOUS EVERY 5 MIN PRN
Status: DISCONTINUED | OUTPATIENT
Start: 2024-05-21 | End: 2024-05-21 | Stop reason: HOSPADM

## 2024-05-21 RX ORDER — PROCHLORPERAZINE MALEATE 10 MG
10 TABLET ORAL EVERY 6 HOURS PRN
Status: CANCELLED | OUTPATIENT
Start: 2024-05-21

## 2024-05-21 RX ORDER — FAMOTIDINE 10 MG/ML
20 INJECTION INTRAVENOUS ONCE AS NEEDED
Status: DISCONTINUED | OUTPATIENT
Start: 2024-05-21 | End: 2024-05-21 | Stop reason: HOSPADM

## 2024-05-21 RX ORDER — DIPHENHYDRAMINE HYDROCHLORIDE 50 MG/ML
50 INJECTION INTRAMUSCULAR; INTRAVENOUS AS NEEDED
Status: CANCELLED | OUTPATIENT
Start: 2024-05-21

## 2024-05-21 RX ORDER — DIPHENHYDRAMINE HYDROCHLORIDE 50 MG/ML
50 INJECTION INTRAMUSCULAR; INTRAVENOUS AS NEEDED
Status: DISCONTINUED | OUTPATIENT
Start: 2024-05-21 | End: 2024-05-21 | Stop reason: HOSPADM

## 2024-05-21 RX ORDER — ALBUTEROL SULFATE 0.83 MG/ML
3 SOLUTION RESPIRATORY (INHALATION) AS NEEDED
Status: CANCELLED | OUTPATIENT
Start: 2024-05-21

## 2024-05-21 RX ORDER — PROCHLORPERAZINE EDISYLATE 5 MG/ML
10 INJECTION INTRAMUSCULAR; INTRAVENOUS EVERY 6 HOURS PRN
Status: CANCELLED | OUTPATIENT
Start: 2024-05-21

## 2024-05-21 RX ORDER — PALONOSETRON 0.05 MG/ML
0.25 INJECTION, SOLUTION INTRAVENOUS ONCE
Status: COMPLETED | OUTPATIENT
Start: 2024-05-21 | End: 2024-05-21

## 2024-05-21 RX ORDER — PALONOSETRON 0.05 MG/ML
0.25 INJECTION, SOLUTION INTRAVENOUS ONCE
Status: CANCELLED | OUTPATIENT
Start: 2024-05-21

## 2024-05-21 RX ORDER — PROCHLORPERAZINE MALEATE 10 MG
10 TABLET ORAL EVERY 6 HOURS PRN
OUTPATIENT
Start: 2024-06-18

## 2024-05-21 RX ORDER — PROCHLORPERAZINE EDISYLATE 5 MG/ML
10 INJECTION INTRAMUSCULAR; INTRAVENOUS EVERY 6 HOURS PRN
OUTPATIENT
Start: 2024-06-18

## 2024-05-21 RX ORDER — PROCHLORPERAZINE MALEATE 10 MG
10 TABLET ORAL EVERY 6 HOURS PRN
Status: DISCONTINUED | OUTPATIENT
Start: 2024-05-21 | End: 2024-05-21 | Stop reason: HOSPADM

## 2024-05-21 RX ORDER — DIPHENHYDRAMINE HYDROCHLORIDE 50 MG/ML
50 INJECTION INTRAMUSCULAR; INTRAVENOUS AS NEEDED
OUTPATIENT
Start: 2024-06-18

## 2024-05-21 RX ORDER — HEPARIN SODIUM,PORCINE/PF 10 UNIT/ML
50 SYRINGE (ML) INTRAVENOUS AS NEEDED
Status: CANCELLED | OUTPATIENT
Start: 2024-05-21

## 2024-05-21 RX ORDER — EPINEPHRINE 0.3 MG/.3ML
0.3 INJECTION SUBCUTANEOUS EVERY 5 MIN PRN
Status: CANCELLED | OUTPATIENT
Start: 2024-05-21

## 2024-05-21 RX ADMIN — FLUOROURACIL 5150 MG: 50 INJECTION, SOLUTION INTRAVENOUS at 15:19

## 2024-05-21 RX ADMIN — PALONOSETRON HYDROCHLORIDE 250 MCG: 0.25 INJECTION INTRAVENOUS at 13:07

## 2024-05-21 RX ADMIN — DEXAMETHASONE SODIUM PHOSPHATE 12 MG: 10 INJECTION, SOLUTION INTRAMUSCULAR; INTRAVENOUS at 13:05

## 2024-05-21 RX ADMIN — ATROPINE SULFATE 0.4 MG: 0.4 INJECTION, SOLUTION INTRAVENOUS at 13:07

## 2024-05-21 RX ADMIN — IRINOTECAN HYDROCHLORIDE 400 MG: 20 INJECTION, SOLUTION INTRAVENOUS at 13:41

## 2024-05-21 RX ADMIN — BEVACIZUMAB-AWWB 449 MG: 400 INJECTION, SOLUTION INTRAVENOUS at 13:24

## 2024-05-21 ASSESSMENT — PAIN SCALES - GENERAL
PAINLEVEL: 0-NO PAIN
PAINLEVEL: 0-NO PAIN

## 2024-05-23 ENCOUNTER — INFUSION (OUTPATIENT)
Dept: HEMATOLOGY/ONCOLOGY | Facility: CLINIC | Age: 74
End: 2024-05-23
Payer: MEDICARE

## 2024-05-23 ENCOUNTER — APPOINTMENT (OUTPATIENT)
Dept: HEMATOLOGY/ONCOLOGY | Facility: CLINIC | Age: 74
End: 2024-05-23
Payer: MEDICARE

## 2024-05-23 VITALS
BODY MASS INDEX: 26.06 KG/M2 | OXYGEN SATURATION: 95 % | HEART RATE: 56 BPM | DIASTOLIC BLOOD PRESSURE: 82 MMHG | SYSTOLIC BLOOD PRESSURE: 123 MMHG | TEMPERATURE: 97.3 F | WEIGHT: 196.43 LBS | RESPIRATION RATE: 18 BRPM

## 2024-05-23 DIAGNOSIS — C20 MALIGNANT NEOPLASM OF RECTUM (MULTI): ICD-10-CM

## 2024-05-23 PROCEDURE — 96523 IRRIG DRUG DELIVERY DEVICE: CPT

## 2024-05-23 PROCEDURE — 2500000004 HC RX 250 GENERAL PHARMACY W/ HCPCS (ALT 636 FOR OP/ED): Performed by: INTERNAL MEDICINE

## 2024-05-23 RX ORDER — HEPARIN 100 UNIT/ML
500 SYRINGE INTRAVENOUS AS NEEDED
OUTPATIENT
Start: 2024-05-23

## 2024-05-23 RX ORDER — HEPARIN 100 UNIT/ML
500 SYRINGE INTRAVENOUS AS NEEDED
Status: DISCONTINUED | OUTPATIENT
Start: 2024-05-23 | End: 2024-05-23 | Stop reason: HOSPADM

## 2024-05-23 RX ORDER — HEPARIN SODIUM,PORCINE/PF 10 UNIT/ML
50 SYRINGE (ML) INTRAVENOUS AS NEEDED
Status: DISCONTINUED | OUTPATIENT
Start: 2024-05-23 | End: 2024-05-23 | Stop reason: HOSPADM

## 2024-05-23 RX ORDER — HEPARIN SODIUM,PORCINE/PF 10 UNIT/ML
50 SYRINGE (ML) INTRAVENOUS AS NEEDED
OUTPATIENT
Start: 2024-05-23

## 2024-05-23 RX ADMIN — HEPARIN 500 UNITS: 100 SYRINGE at 14:05

## 2024-05-23 ASSESSMENT — PAIN SCALES - GENERAL: PAINLEVEL: 0-NO PAIN

## 2024-05-23 NOTE — PROGRESS NOTES
Pt here for pump disconnect. Pt arrived ambulatory. Pt denies any pain. Port site c/d/I. Blood return present. Port flushed per order. Port deaccessed and bandaged. Pt denied need for AVS. Follow up appts discussed. Pt verbalized understanding. Pt left infusion clinic ambulatory.

## 2024-05-31 ENCOUNTER — APPOINTMENT (OUTPATIENT)
Dept: HEMATOLOGY/ONCOLOGY | Facility: CLINIC | Age: 74
End: 2024-05-31
Payer: MEDICARE

## 2024-06-04 ENCOUNTER — APPOINTMENT (OUTPATIENT)
Dept: HEMATOLOGY/ONCOLOGY | Facility: CLINIC | Age: 74
End: 2024-06-04
Payer: MEDICARE

## 2024-06-05 ENCOUNTER — TELEPHONE (OUTPATIENT)
Dept: HEMATOLOGY/ONCOLOGY | Facility: CLINIC | Age: 74
End: 2024-06-05
Payer: MEDICARE

## 2024-06-06 ENCOUNTER — APPOINTMENT (OUTPATIENT)
Dept: HEMATOLOGY/ONCOLOGY | Facility: CLINIC | Age: 74
End: 2024-06-06
Payer: MEDICARE

## 2024-06-07 ENCOUNTER — APPOINTMENT (OUTPATIENT)
Dept: HEMATOLOGY/ONCOLOGY | Facility: CLINIC | Age: 74
End: 2024-06-07
Payer: MEDICARE

## 2024-06-14 ENCOUNTER — TELEPHONE (OUTPATIENT)
Dept: HEMATOLOGY/ONCOLOGY | Facility: CLINIC | Age: 74
End: 2024-06-14
Payer: MEDICARE

## 2024-06-14 NOTE — TELEPHONE ENCOUNTER
In basket message sent to MD Pickard & CLARITA Ott regarding patient not showing up for infusion appointment today.

## 2024-06-17 ENCOUNTER — TELEPHONE (OUTPATIENT)
Dept: HEMATOLOGY/ONCOLOGY | Facility: CLINIC | Age: 74
End: 2024-06-17
Payer: MEDICARE

## 2024-06-17 ENCOUNTER — DOCUMENTATION (OUTPATIENT)
Dept: CASE MANAGEMENT | Facility: HOSPITAL | Age: 74
End: 2024-06-17
Payer: MEDICARE

## 2024-06-17 ENCOUNTER — TELEPHONE (OUTPATIENT)
Dept: CASE MANAGEMENT | Facility: HOSPITAL | Age: 74
End: 2024-06-17
Payer: MEDICARE

## 2024-06-17 NOTE — PROGRESS NOTES
SW tried contacting patient via phone. No answer/ no voicemail to leave message. Patient is scheduled tomorrow for onc appointment. Plan to see patient at that time.

## 2024-06-18 ENCOUNTER — TELEPHONE (OUTPATIENT)
Dept: HEMATOLOGY/ONCOLOGY | Facility: CLINIC | Age: 74
End: 2024-06-18
Payer: MEDICARE

## 2024-06-18 ENCOUNTER — APPOINTMENT (OUTPATIENT)
Dept: HEMATOLOGY/ONCOLOGY | Facility: CLINIC | Age: 74
End: 2024-06-18
Payer: MEDICARE

## 2024-06-18 ENCOUNTER — DOCUMENTATION (OUTPATIENT)
Dept: CASE MANAGEMENT | Facility: HOSPITAL | Age: 74
End: 2024-06-18
Payer: MEDICARE

## 2024-06-18 NOTE — PROGRESS NOTES
MD and nurse practitioner referred EMILY to inquire on patient's whereabouts. He has missed several appointments and no one can reach him via phone. SW left messages on patient and his contacts voicemail's.  EMILY spoke to APS to go over concerns and directed me to [place] police for welfare check(WC). Police will visit patient's home and will update EMILY once WC is complete.

## 2024-06-18 NOTE — TELEPHONE ENCOUNTER
Called patient and alternate contacts regarding today's appointment and apparent recent history of no shows. Left call back number on patient's phone, as well as both listed alternate contacts.

## 2024-06-19 ENCOUNTER — TELEPHONE (OUTPATIENT)
Dept: HEMATOLOGY/ONCOLOGY | Facility: CLINIC | Age: 74
End: 2024-06-19
Payer: MEDICARE

## 2024-06-20 ENCOUNTER — APPOINTMENT (OUTPATIENT)
Dept: HEMATOLOGY/ONCOLOGY | Facility: CLINIC | Age: 74
End: 2024-06-20
Payer: MEDICARE

## 2024-06-28 ENCOUNTER — APPOINTMENT (OUTPATIENT)
Dept: HEMATOLOGY/ONCOLOGY | Facility: CLINIC | Age: 74
End: 2024-06-28
Payer: MEDICARE

## 2024-07-01 ENCOUNTER — TELEPHONE (OUTPATIENT)
Dept: HEMATOLOGY/ONCOLOGY | Facility: CLINIC | Age: 74
End: 2024-07-01
Payer: MEDICARE

## 2024-07-01 NOTE — TELEPHONE ENCOUNTER
I called and left Jayleen message regarding the infusion ans follow up appt he wanted to cancel. Also to see how he was feeling.

## 2024-07-02 ENCOUNTER — APPOINTMENT (OUTPATIENT)
Dept: HEMATOLOGY/ONCOLOGY | Facility: CLINIC | Age: 74
End: 2024-07-02
Payer: MEDICARE

## 2024-07-02 NOTE — PROGRESS NOTES
Patient ID: Bobby Holland is a 73 y.o. male from [place], [place].     Referring Physician: Bobby Pickard MD  [id number] [place] [address]    Primary Care Provider: Bobby Pickard MD    Cancer History:   Treatment Synopsis:    DIAGNOSIS  Rectal Cancer 5/2023     STAGING  [id number]     CURRENT SITES OF DISEASE  Rectum, liver and lung     MOLECULAR GENOMICS    Test Name: [place] xT (XT.V4)  Laboratory Name: [facility]  Specimen Source: Colon, rectosigmoid  Collection Date: 15-May-2023  Cancer Type: Invasive adenocarcinoma  Report Id: [id number]    Molecular Findings:  * Gene: KRAS, Variant: Missense variant (exon 2) - GOF, Potentially Actionable, p.G12V (Allele Frequency - 16.6%)  * Gene: APC, Variant: Frameshift - LOF, Biologically Relevant, p.[id number] (Allele Frequency - 26.6%)  * Gene: APC, Variant: Stop gain - LOF, Biologically Relevant, p.R213*, Nonsense (Allele Frequency - 20.2%)  * Gene: FBXW7, Variant: Missense variant - LOF, Biologically Relevant, p.R505C (Allele Frequency - 23.3%)  * Gene: CCND1, Variant: Amplification, Copy number gain, Biologically Relevant  * Gene: FGF3, Variant: Amplification, Copy number gain, Biologically Relevant  * Gene: FGF4, Variant: Amplification, Copy number gain, Biologically Relevant  * Biomarker: Microsatellite Instability, Status: stable  * Biomarker: Tumor Mutation [place] (5.8 Muts/Mb, Percentile: 63.0)  * Disease associated genes/variants with no reportable findings:     * BRAF    * NRAS  * 7 variants of unknown significance    ** End of copied information **     SERUM TUMOR MARKER  CEA 4.4 - June 2023     CURRENT THERAPY  Dec 2023 progression- changed to FOLFIRI      PRIOR THERAPY  July 2023 - Dec 2023 FOLFOX      DEVICES  mediport     CURRENT PROBLEM LIST   ITP  - spleenectomy 2012   Graves ds  Metastatic colorectal cancer        Treatment History:    73 y.o.  male presenting to the emergency room with hematochezia in early April hemoglobin low taken off  Eliquis.  Bleeding initially resolved.  Anoscopy in May  2023 showed a large circumferential nonobstructing mass 7 cm in size at 9 cm from the anal verge.  Biopsy showed moderately differentiated adenocarcinoma.  MMR was proficient.  CEA was 4.7.  Imaging with Dr. Mccrary which showed lung and liver metastases.   PET scan confirmed FDG avidity of these lesions.          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  Social History:  Bobby reports that he has never smoked. He has never been exposed to tobacco smoke. He has never used smokeless tobacco. He reports current alcohol use of about 2.0 standard drinks of alcohol per week. He reports that he does not use drugs.  Family History:  No family history on file.  Family Oncology History:  Cancer-related family history is not on file.        SUBJECTIVE:    History of Present Illness:  Bobby Holland is a 73 y.o. male who was referred by Bobby Pickard MD  and presents with No chief complaint on file.    S/p 8 cycles FOLFIRI - here for assmt prior to C9  Overall feels well - denies any cough, sob, cp   No fevers or chills.   No diarrhea   Energy level is pretty good   Denies pain   No other concerns       OBJECTIVE:    VS / Pain:  There were no vitals taken for this visit.  BSA: There is no height or weight on file to calculate BSA.   Pain Scale: 0    Daily Weight  05/23/24 : 89.1 kg (196 lb 6.9 oz)  05/21/24 : 89.8 kg (197 lb 13.8 oz)  05/21/24 : 91 kg (200 lb 9.9 oz)  05/17/24 : 89.9 kg (198 lb 3.1 oz)  05/09/24 : 89.9 kg (198 lb 4.9 oz)  05/07/24 : 91.2 kg (201 lb 1 oz)  05/03/24 : 91.9 kg (202 lb 9.6 oz)      Physical Exam  Constitutional:       Appearance: He is normal weight.   HENT:      Nose: Nose normal.      Mouth/Throat:      Mouth: Mucous membranes are moist.   Eyes:      Pupils: Pupils are  equal, round, and reactive to light.   Cardiovascular:      Rate and Rhythm: Normal rate.      Pulses: Normal pulses.   Abdominal:      General: Abdomen is flat.   Musculoskeletal:         General: Normal range of motion.   Skin:     General: Skin is warm.   Neurological:      General: No focal deficit present.      Mental Status: He is alert.   Psychiatric:         Mood and Affect: Mood normal.         Judgment: Judgment normal.         Performance Status:   ECOG 1    Diagnostic Results         WBC   Date/Time Value Ref Range Status   05/17/2024 02:05 PM 7.2 4.4 - 11.3 x10*3/uL Final   05/03/2024 02:46 PM 6.6 4.4 - 11.3 x10*3/uL Final   04/19/2024 01:39 PM 6.5 4.4 - 11.3 x10*3/uL Final     Hemoglobin   Date Value Ref Range Status   05/17/2024 11.6 (L) 13.5 - 17.5 g/dL Final   05/03/2024 11.2 (L) 13.5 - 17.5 g/dL Final   04/19/2024 11.1 (L) 13.5 - 17.5 g/dL Final     MCV   Date/Time Value Ref Range Status   05/17/2024 02:05 PM 83 80 - 100 fL Final   05/03/2024 02:46 PM 84 80 - 100 fL Final   04/19/2024 01:39 PM 85 80 - 100 fL Final     Platelets   Date/Time Value Ref Range Status   05/17/2024 02:05  150 - 450 x10*3/uL Final   05/03/2024 02:46  150 - 450 x10*3/uL Final   04/19/2024 01:39  150 - 450 x10*3/uL Final     Neutrophils Absolute   Date/Time Value Ref Range Status   05/17/2024 02:05 PM 3.27 1.60 - 5.50 x10*3/uL Final     Comment:     Percent differential counts (%) should be interpreted in the context of the absolute cell counts (cells/uL).   05/03/2024 02:46 PM 3.00 1.60 - 5.50 x10*3/uL Final     Comment:     Percent differential counts (%) should be interpreted in the context of the absolute cell counts (cells/uL).   04/19/2024 01:39 PM 2.58 1.60 - 5.50 x10*3/uL Final     Comment:     Percent differential counts (%) should be interpreted in the context of the absolute cell counts (cells/uL).     Bilirubin, Total   Date/Time Value Ref Range Status   05/17/2024 02:05 PM 0.5 0.0 - 1.2 mg/dL  "Final   05/03/2024 02:46 PM 0.5 0.0 - 1.2 mg/dL Final   04/19/2024 01:39 PM 0.4 0.0 - 1.2 mg/dL Final     AST   Date/Time Value Ref Range Status   05/17/2024 02:05 PM 18 9 - 39 U/L Final   05/03/2024 02:46 PM 18 9 - 39 U/L Final   04/19/2024 01:39 PM 16 9 - 39 U/L Final     ALT   Date/Time Value Ref Range Status   05/17/2024 02:05 PM 17 10 - 52 U/L Final     Comment:     Patients treated with Sulfasalazine may generate falsely decreased results for ALT.   05/03/2024 02:46 PM 16 10 - 52 U/L Final     Comment:     Patients treated with Sulfasalazine may generate falsely decreased results for ALT.   04/19/2024 01:39 PM 14 10 - 52 U/L Final     Comment:     Patients treated with Sulfasalazine may generate falsely decreased results for ALT.     Creatinine   Date/Time Value Ref Range Status   05/17/2024 02:05 PM 0.81 0.50 - 1.30 mg/dL Final   05/03/2024 02:46 PM 0.76 0.50 - 1.30 mg/dL Final   04/19/2024 01:39 PM 0.73 0.50 - 1.30 mg/dL Final     Urea Nitrogen   Date/Time Value Ref Range Status   05/17/2024 02:05 PM 13 6 - 23 mg/dL Final   05/03/2024 02:46 PM 16 6 - 23 mg/dL Final   04/19/2024 01:39 PM 14 6 - 23 mg/dL Final     Albumin   Date/Time Value Ref Range Status   05/17/2024 02:05 PM 3.5 3.4 - 5.0 g/dL Final   05/03/2024 02:46 PM 3.5 3.4 - 5.0 g/dL Final   04/19/2024 01:39 PM 3.5 3.4 - 5.0 g/dL Final     No results found for: \"\"  Carcinoembryonic AG   Date/Time Value Ref Range Status   05/17/2024 02:05 PM 7.1 ug/L Final   05/03/2024 02:46 PM 6.6 ug/L Final   04/23/2024 08:19 AM 10.0 ug/L Final     === 05/16/24 ===    CT CHEST ABDOMEN PELVIS W IV CONTRAST    - Impression -  CHEST:  1.  Stable pulmonary nodules and right lower paratracheal lymph node  as described.  2. Moderate coronary artery calcifications.    ABDOMEN-PELVIS:  1.  Stable appearance of circumferential masslike wall thickening of  the superior rectum.  2. Stable appearance of numerous hypoattenuating lesions throughout  the liver.  3. Stable " appearance of right inguinal hernia containing multiple  loops of bowel without CT evidence of obstruction.      MACRO:  None    Signed by: Rich Medina 5/18/2024 5:12 PM  Dictation workstation:   [id number]    Assessment/Plan     Assessment:    73 y.o. man with metastatic rectal cancer with liver and lung lesions diagnosed originally in 6/2023. Developed progressive disease in 12/2023.  He started FOLFIRI + Bevacizumab after that.      Plan:  Stage IV rectal Ca: s/p 8 cycles of FOLFIRI / Yohana   CEA level decreasing   Plan for C9 this week    H/o ITP -     - s/p splencectomy   -   plt cutoff is 75,000 for chemotherapy   -receiving romiplostim every other week during chemo     - PLTs have been stable on this      Bobby Pickard MD  [facility] [place]  Office: [phone]  Fax: [phone]

## 2024-07-03 ENCOUNTER — APPOINTMENT (OUTPATIENT)
Dept: HEMATOLOGY/ONCOLOGY | Facility: CLINIC | Age: 74
End: 2024-07-03
Payer: MEDICARE

## 2024-07-05 ENCOUNTER — APPOINTMENT (OUTPATIENT)
Dept: HEMATOLOGY/ONCOLOGY | Facility: CLINIC | Age: 74
End: 2024-07-05
Payer: MEDICARE

## 2024-07-12 ENCOUNTER — TELEPHONE (OUTPATIENT)
Dept: HEMATOLOGY/ONCOLOGY | Facility: CLINIC | Age: 74
End: 2024-07-12
Payer: MEDICARE

## 2024-07-26 ENCOUNTER — APPOINTMENT (OUTPATIENT)
Dept: HEMATOLOGY/ONCOLOGY | Facility: CLINIC | Age: 74
End: 2024-07-26
Payer: MEDICARE

## 2024-07-29 ENCOUNTER — APPOINTMENT (OUTPATIENT)
Dept: CARDIOLOGY | Facility: CLINIC | Age: 74
End: 2024-07-29
Payer: MEDICARE

## 2024-08-07 ENCOUNTER — OFFICE VISIT (OUTPATIENT)
Dept: CARDIOLOGY | Facility: CLINIC | Age: 74
End: 2024-08-07
Payer: MEDICARE

## 2024-08-07 VITALS
SYSTOLIC BLOOD PRESSURE: 115 MMHG | HEART RATE: 66 BPM | OXYGEN SATURATION: 94 % | BODY MASS INDEX: 24.95 KG/M2 | DIASTOLIC BLOOD PRESSURE: 71 MMHG | HEIGHT: 73 IN | WEIGHT: 188.25 LBS

## 2024-08-07 DIAGNOSIS — I48.3 TYPICAL ATRIAL FLUTTER (MULTI): Primary | ICD-10-CM

## 2024-08-07 PROCEDURE — 3074F SYST BP LT 130 MM HG: CPT | Performed by: INTERNAL MEDICINE

## 2024-08-07 PROCEDURE — 99214 OFFICE O/P EST MOD 30 MIN: CPT | Performed by: INTERNAL MEDICINE

## 2024-08-07 PROCEDURE — 1159F MED LIST DOCD IN RCRD: CPT | Performed by: INTERNAL MEDICINE

## 2024-08-07 PROCEDURE — 1036F TOBACCO NON-USER: CPT | Performed by: INTERNAL MEDICINE

## 2024-08-07 PROCEDURE — 3078F DIAST BP <80 MM HG: CPT | Performed by: INTERNAL MEDICINE

## 2024-08-07 PROCEDURE — 1160F RVW MEDS BY RX/DR IN RCRD: CPT | Performed by: INTERNAL MEDICINE

## 2024-08-07 PROCEDURE — 1126F AMNT PAIN NOTED NONE PRSNT: CPT | Performed by: INTERNAL MEDICINE

## 2024-08-07 RX ORDER — PROPRANOLOL HYDROCHLORIDE 20 MG/1
20 TABLET ORAL 3 TIMES DAILY
Qty: 270 TABLET | Refills: 3 | Status: SHIPPED | OUTPATIENT
Start: 2024-08-07 | End: 2025-08-07

## 2024-08-07 ASSESSMENT — ENCOUNTER SYMPTOMS
LOSS OF SENSATION IN FEET: 0
OCCASIONAL FEELINGS OF UNSTEADINESS: 0
DEPRESSION: 0

## 2024-08-07 ASSESSMENT — PAIN SCALES - GENERAL: PAINLEVEL: 0-NO PAIN

## 2024-08-07 NOTE — PROGRESS NOTES
"Primary Care Physician: Bobby Pickard MD  Date of Visit: 08/07/2024  1:20 PM EDT  [place] [facility] [place] [address]     Chief Complaint:   Chief Complaint   Patient presents with    Follow-up     Atrial Flutter    Hypertension        HPI / Summary:   Bobby Holland is a 74 y.o. male presents for followup.     Rectal cancer diagnosed in May 2023.  [id number] per staging note from oncology  Involvement in rectum liver and lung  He had presented with hematochezia in early April low hemoglobin taken off of Eliquis endoscopy in May 2023 showed a large circumferential nonobstructing mass 7 cm in size biopsy-positive  History of atrial arrhythmia (flutter) associated with thyrotoxicosis.  ITP status post splenectomy 2018    Reviewing prior ECGs the last 1 that showed A-fib was in November 2021    Specialty Problems          Cardiology Problems    Atrial flutter (Multi)    Dyspnea on exertion    Essential hypertension    Idiopathic thrombocytopenic purpura (ITP) (Multi)    Paroxysmal atrial fibrillation (Multi)        Past Medical History:   Diagnosis Date    Immune thrombocytopenic purpura (Multi) 03/11/2014    Immune thrombocytopenic purpura          Past Surgical History:   Procedure Laterality Date    SPLENECTOMY, TOTAL  08/20/2018    Splenectomy          Social History:   reports that he has never smoked. He has never been exposed to tobacco smoke. He has never used smokeless tobacco. He reports current alcohol use of about 2.0 standard drinks of alcohol per week. He reports that he does not use drugs.      Allergies:  No Known Allergies    Outpatient Medications:  Current Outpatient Medications   Medication Instructions    propranolol (INDERAL) 60 mg, oral, 3 times daily       ROS     Physical Exam:  Vitals:    08/07/24 1332   BP: 115/71   BP Location: Right arm   Patient Position: Sitting   BP Cuff Size: Large adult   Pulse: 66   SpO2: 94%   Weight: 85.4 kg (188 lb 4 oz)   Height: 1.854 m (6' 1\")     Wt Readings " "from Last 5 Encounters:   08/07/24 85.4 kg (188 lb 4 oz)   05/23/24 89.1 kg (196 lb 6.9 oz)   05/21/24 89.8 kg (197 lb 13.8 oz)   05/21/24 91 kg (200 lb 9.9 oz)   05/17/24 89.9 kg (198 lb 3.1 oz)     Body mass index is 24.84 kg/m².     He appears frail.  Neck veins were flat.  Systolic ejection murmur.  Clear lungs.  Soft abdomen.  No dependent edema  Last Labs:  CMP:  Recent Labs     05/17/24  1405 05/03/24  1446 04/19/24  1339 04/05/24  0903 03/27/24  1234    137 138 137 140   K 3.9 4.0 3.9 3.9 3.9    102 101 100 105   CO2 28 27 29 29 26   ANIONGAP 12 12 12 12 13   BUN 13 16 14 13 18   CREATININE 0.81 0.76 0.73 0.68 0.77   EGFR >90 >90 >90 >90 >90   GLUCOSE 103* 101* 95 98 97     Recent Labs     05/17/24  1405 05/03/24  1446 04/19/24  1339 04/05/24  0903 03/27/24  1234   ALBUMIN 3.5 3.5 3.5 3.4 3.6   ALKPHOS 96 84 87 87 89   ALT 17 16 14 16 16   AST 18 18 16 17 20   BILITOT 0.5 0.5 0.4 0.6 0.5     CBC:  Recent Labs     05/17/24  1405 05/03/24  1446 04/19/24  1339 04/05/24  0903 03/27/24  1234   WBC 7.2 6.6 6.5 6.5 9.6   HGB 11.6* 11.2* 11.1* 11.3* 11.6*   HCT 35.3* 35.2* 34.6* 34.6* 35.7*    293 353 131* 174   MCV 83 84 85 83 84     COAG:   Recent Labs     09/12/23  1349 06/21/23  1030 06/22/18  1603   INR 0.9 1.0 1.0     HEME/ENDO:  Recent Labs     01/15/24  1419 11/14/23  1706 05/13/23  1111 03/18/23  1108 11/26/22  1021   FERRITIN  --   --  17*  --   --    IRONSAT  --   --  NOT CALC.  --   --    TSH 3.28 6.03*  --  2.02 6.31*      CARDIAC:   Recent Labs     01/20/20  1426 07/15/19  1359 04/16/19  1001 01/15/19  1555 07/10/18  1603 06/22/18  1603    212 221 214 198  --    BNP  --   --   --   --   --  136*   No results for input(s): \"CHOL\", \"LDLF\", \"HDL\", \"TRIG\" in the last 62501 hours.    Last Cardiology Tests:  ECG:      Echo:  Echo Results:  No results found for this or any previous visit from the past 3650 days.       Cath:      Stress Test:  Stress Results:  No results found for " this or any previous visit from the past 365 days.         Cardiac Imaging:  CT chest abdomen pelvis w IV contrast  Narrative: Interpreted By:  Rich Medina,   STUDY:  CT CHEST ABDOMEN PELVIS W IV CONTRAST;  5/16/2024 11:08 am      INDICATION:  Signs/Symptoms:metastatic colorectal cancer - on chemotherapy -  follow up scan - compare to last ct - eval for dz response.      COMPARISON:  02/29/2024      ACCESSION NUMBER(S):  [id number]      ORDERING CLINICIAN:  DAVI KHOURY      TECHNIQUE:  CT of the chest, abdomen, and pelvis was performed.  Contiguous axial  images were obtained at 3 mm slice thickness through the chest,  abdomen and pelvis. Coronal and sagittal reconstructions at 3 mm  slice thickness were performed.  75 ML ml of contrast Omnipaque 350 was administered intravenously  without immediate complication.      FINDINGS:  CHEST:      LUNGS AND AIRWAYS:  The trachea and central airways are patent. No endobronchial lesion.      Mild bibasilar atelectasis. No pleural effusion, focal consolidation  or pneumothorax.      Stable 6 mm solid pulmonary nodule in the right upper lobe on image  81. Stable 6 mm solid pulmonary nodule in the right upper lobe on  image 86. Stable 1.1 cm solid pulmonary nodule in the left upper lobe  on image 190. Additional stable bilateral pulmonary nodules are  identified. No new pulmonary nodules.      MEDIASTINUM AND MARRY, LOWER NECK AND AXILLA:  The visualized thyroid gland is within normal limits.      Stable 1 cm right lower paratracheal lymph node.      Esophagus appears within normal limits as seen.      HEART AND VESSELS:  Stable position of left MediPort.      The thoracic aorta is of normal course and caliber with mild vascular  calcifications.      Main pulmonary artery and its branches are normal in caliber.      Moderate coronary artery calcifications are seen. The study is not  optimized for evaluation of coronary arteries.      The cardiac chambers are not  enlarged.      No evidence of pericardial effusion.      CHEST WALL AND OSSEOUS STRUCTURES:  There are no suspicious osseous lesions. Multilevel degenerative  changes are present      SOFT TISSUE:  The soft tissues appear unremarkable.      ABDOMEN:      LIVER:  Multiple stable hypoattenuating lesions are again seen throughout the  liver for exam: Stable 5.4 x 4.1 cm hypoattenuating lesion in hepatic  segment 7/8. Stable 1.5 x 1.4 cm hypoattenuating lesion in hepatic  segment 6.      BILE DUCTS:  The intrahepatic and extrahepatic ducts are not dilated.      GALLBLADDER:  The gallbladder is nondistended and without evidence of radiopaque  stones.      PANCREAS:  The pancreas appears unremarkable without evidence of ductal  dilatation or masses.      SPLEEN:  The space surgically absent. A small splenule is again seen within  the splenectomy bed.      ADRENAL GLANDS:  Bilateral adrenal glands appear normal.      KIDNEYS AND URETERS:  The kidneys are normal in size and enhance symmetrically. Punctate  bilateral nonobstructing renal calculi are again seen. No evidence of  hydroureteronephrosis or ureterolithiasis. Stable appearance of  multiple bilateral hypoattenuating lesions some which are too small  to characterize but likely represent renal cysts.      PELVIS:      BLADDER:  Decompressed limiting evaluation.      REPRODUCTIVE ORGANS:  The prostate gland is normal in size.      BOWEL:  The stomach is unremarkable. Stable irregular wall thickening within  the upper rectum measuring approximately 2.7 cm in length. Otherwise  the bowel is normal in caliber without evidence of wall thickening. A  large amount of stool is seen throughout the colon. The appendix  appears normal.      VESSELS:  There is no aneurysmal dilatation of the abdominal aorta. The IVC  appears normal. There are mild atherosclerotic calcifications of the  abdominal aorta and its branches.      PERITONEUM/RETROPERITONEUM/LYMPH NODES:  No ascites or  free air, no fluid collection.      No abdominopelvic lymphadenopathy is present.      BONES AND ABDOMINAL WALL:  No suspicious osseous lesions are identified. Degenerative discogenic  disease is noted in the lower thoracic and lumbar spine.      Redemonstration of large right inguinal hernia containing multiple  loops of bowel without CT evidence of obstruction.      Impression: CHEST:  1.  Stable pulmonary nodules and right lower paratracheal lymph node  as described.  2. Moderate coronary artery calcifications.      ABDOMEN-PELVIS:  1.  Stable appearance of circumferential masslike wall thickening of  the superior rectum.  2. Stable appearance of numerous hypoattenuating lesions throughout  the liver.  3. Stable appearance of right inguinal hernia containing multiple  loops of bowel without CT evidence of obstruction.          MACRO:  None      Signed by: Rich Medina 5/18/2024 5:12 PM  Dictation workstation:   [id number]        Assessment/Plan       History of atrial flutter in the setting of hyperthyroidism.  He requests de-escalation of beta-blocker dose due to relative hypotension I told him we need to wean this over time as a first step I will pretty aggressively de-escalate his dose from 60-20 3 times daily I told him to do that for about a month and thereafter decrease it by 1 pill a day for the following 2 to 3 weeks before stopping it completely.  I asked him to contact me if he develops any symptoms palpitations flushing or shortness of breath.  I will see him back in about 3 months    Orders:  No orders of the defined types were placed in this encounter.     Followup Appts:  Future Appointments   Date Time Provider Department Center   8/13/2024  8:20 AM Bobby Pickard MD [id number] [facility]   9/6/2024  1:30 PM INF 00 ARNEX MENTOR [id number] [facility]   10/7/2024  9:00 AM Juliano Clancy MD [id number] [facility]           ____________________________________________________________  Zafar Mckeon  MD    Senior Attending Physician  [facility]  [facility]

## 2024-08-09 ENCOUNTER — APPOINTMENT (OUTPATIENT)
Dept: HEMATOLOGY/ONCOLOGY | Facility: CLINIC | Age: 74
End: 2024-08-09
Payer: MEDICARE

## 2024-08-13 ENCOUNTER — OFFICE VISIT (OUTPATIENT)
Dept: HEMATOLOGY/ONCOLOGY | Facility: CLINIC | Age: 74
End: 2024-08-13
Payer: MEDICARE

## 2024-08-13 VITALS
TEMPERATURE: 95.5 F | RESPIRATION RATE: 18 BRPM | DIASTOLIC BLOOD PRESSURE: 78 MMHG | SYSTOLIC BLOOD PRESSURE: 118 MMHG | BODY MASS INDEX: 24.37 KG/M2 | OXYGEN SATURATION: 95 % | WEIGHT: 184.75 LBS | HEART RATE: 63 BPM

## 2024-08-13 DIAGNOSIS — C20 MALIGNANT NEOPLASM OF RECTUM (MULTI): ICD-10-CM

## 2024-08-13 PROCEDURE — 3074F SYST BP LT 130 MM HG: CPT | Performed by: INTERNAL MEDICINE

## 2024-08-13 PROCEDURE — 1159F MED LIST DOCD IN RCRD: CPT | Performed by: INTERNAL MEDICINE

## 2024-08-13 PROCEDURE — 1126F AMNT PAIN NOTED NONE PRSNT: CPT | Performed by: INTERNAL MEDICINE

## 2024-08-13 PROCEDURE — 1036F TOBACCO NON-USER: CPT | Performed by: INTERNAL MEDICINE

## 2024-08-13 PROCEDURE — 3078F DIAST BP <80 MM HG: CPT | Performed by: INTERNAL MEDICINE

## 2024-08-13 PROCEDURE — 99215 OFFICE O/P EST HI 40 MIN: CPT | Performed by: INTERNAL MEDICINE

## 2024-08-13 ASSESSMENT — ENCOUNTER SYMPTOMS
OCCASIONAL FEELINGS OF UNSTEADINESS: 0
LOSS OF SENSATION IN FEET: 0
DEPRESSION: 0

## 2024-08-13 ASSESSMENT — PAIN SCALES - GENERAL: PAINLEVEL: 0-NO PAIN

## 2024-08-13 NOTE — PROGRESS NOTES
Patient ID: Bobby Holland is a 74 y.o. male from [place], [place].     Referring Physician: Bobby Pickard MD  [address] [place] [address]    Primary Care Provider: Bobby Pickard MD    Cancer History:   Treatment Synopsis:    DIAGNOSIS  Rectal Cancer 5/2023     STAGING  [id number]     CURRENT SITES OF DISEASE  Rectum, liver and lung     MOLECULAR GENOMICS    Test Name: [place] xT (XT.V4)  Laboratory Name: [facility]  Specimen Source: Colon, rectosigmoid  Collection Date: 15-May-2023  Cancer Type: Invasive adenocarcinoma  Report Id: [id number]    Molecular Findings:  * Gene: KRAS, Variant: Missense variant (exon 2) - GOF, Potentially Actionable, p.G12V (Allele Frequency - 16.6%)  * Gene: APC, Variant: Frameshift - LOF, Biologically Relevant, p.C9116zt (Allele Frequency - 26.6%)  * Gene: APC, Variant: Stop gain - LOF, Biologically Relevant, p.R213*, Nonsense (Allele Frequency - 20.2%)  * Gene: FBXW7, Variant: Missense variant - LOF, Biologically Relevant, p.R505C (Allele Frequency - 23.3%)  * Gene: CCND1, Variant: Amplification, Copy number gain, Biologically Relevant  * Gene: FGF3, Variant: Amplification, Copy number gain, Biologically Relevant  * Gene: FGF4, Variant: Amplification, Copy number gain, Biologically Relevant  * Biomarker: Microsatellite Instability, Status: stable  * Biomarker: Tumor Mutation Aurora (5.8 Muts/Mb, Percentile: 63.0)  * Disease associated genes/variants with no reportable findings:     * BRAF    * NRAS  * 7 variants of unknown significance    ** End of copied information **     SERUM TUMOR MARKER  CEA 4.4 - June 2023     CURRENT THERAPY  Dec 2023 progression- changed to FOLFIRI      PRIOR THERAPY  July 2023 - Dec 2023 FOLFOX      DEVICES  mediport     CURRENT PROBLEM LIST   ITP  - spleenectomy 2012   Graves ds  Metastatic colorectal cancer        Treatment History:    74 y.o.  male presenting to the emergency room with hematochezia in early April hemoglobin low taken off  Eliquis.  Bleeding initially resolved.  Anoscopy in May  2023 showed a large circumferential nonobstructing mass 7 cm in size at 9 cm from the anal verge.  Biopsy showed moderately differentiated adenocarcinoma.  MMR was proficient.  CEA was 4.7.  Imaging with Dr. Mccrary which showed lung and liver metastases.   PET scan confirmed FDG avidity of these lesions.          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  Social History:  Bobby reports that he has never smoked. He has never been exposed to tobacco smoke. He has never used smokeless tobacco. He reports current alcohol use of about 2.0 standard drinks of alcohol per week. He reports that he does not use drugs.  Family History:  No family history on file.  Family Oncology History:  Cancer-related family history is not on file.        SUBJECTIVE:    History of Present Illness:  Bobby Holland is a 74 y.o. male who was referred by Bobby Pickard MD  and presents with No chief complaint on file.    S/p 9 cycles FOLFIRI + bevacizumab -last dosed in 6/2024.     Pt lost to follow up due to pt not wanting to get treated anymore.  He is accepting of death but wanted to focus on some projects with his [employer] group.     Overall feels ok, but having some pain when stands for over an hour.    - denies any cough, sob, cp   Appetite is low.  Using marijuana to help.   No fevers or chills.   No diarrhea   Energy level is pretty good   Denies pain   No other concerns       OBJECTIVE:    VS / Pain:  /78   Pulse 63   Temp 35.3 °C (95.5 °F) (Core)   Resp 18   Wt 83.8 kg (184 lb 11.9 oz)   SpO2 95%   BMI 24.37 kg/m²   BSA: 2.08 meters squared   Pain Scale: 0    Daily Weight  08/07/24 : 85.4 kg (188 lb 4 oz)  05/23/24 : 89.1 kg (196 lb 6.9 oz)  05/21/24 : 89.8 kg (197 lb 13.8 oz)  05/21/24 : 91 kg  (200 lb 9.9 oz)  05/17/24 : 89.9 kg (198 lb 3.1 oz)  05/09/24 : 89.9 kg (198 lb 4.9 oz)  05/07/24 : 91.2 kg (201 lb 1 oz)      Physical Exam  Constitutional:       Appearance: He is normal weight.   HENT:      Nose: Nose normal.      Mouth/Throat:      Mouth: Mucous membranes are moist.   Eyes:      Pupils: Pupils are equal, round, and reactive to light.   Cardiovascular:      Rate and Rhythm: Normal rate.      Pulses: Normal pulses.   Abdominal:      General: Abdomen is flat.   Musculoskeletal:         General: Normal range of motion.   Skin:     General: Skin is warm.   Neurological:      General: No focal deficit present.      Mental Status: He is alert.   Psychiatric:         Mood and Affect: Mood normal.         Judgment: Judgment normal.     Performance Status:   ECOG 1    Diagnostic Results     WBC   Date/Time Value Ref Range Status   05/17/2024 02:05 PM 7.2 4.4 - 11.3 x10*3/uL Final   05/03/2024 02:46 PM 6.6 4.4 - 11.3 x10*3/uL Final   04/19/2024 01:39 PM 6.5 4.4 - 11.3 x10*3/uL Final     Hemoglobin   Date Value Ref Range Status   05/17/2024 11.6 (L) 13.5 - 17.5 g/dL Final   05/03/2024 11.2 (L) 13.5 - 17.5 g/dL Final   04/19/2024 11.1 (L) 13.5 - 17.5 g/dL Final     MCV   Date/Time Value Ref Range Status   05/17/2024 02:05 PM 83 80 - 100 fL Final   05/03/2024 02:46 PM 84 80 - 100 fL Final   04/19/2024 01:39 PM 85 80 - 100 fL Final     Platelets   Date/Time Value Ref Range Status   05/17/2024 02:05  150 - 450 x10*3/uL Final   05/03/2024 02:46  150 - 450 x10*3/uL Final   04/19/2024 01:39  150 - 450 x10*3/uL Final     Neutrophils Absolute   Date/Time Value Ref Range Status   05/17/2024 02:05 PM 3.27 1.60 - 5.50 x10*3/uL Final     Comment:     Percent differential counts (%) should be interpreted in the context of the absolute cell counts (cells/uL).   05/03/2024 02:46 PM 3.00 1.60 - 5.50 x10*3/uL Final     Comment:     Percent differential counts (%) should be interpreted in the context of the  "absolute cell counts (cells/uL).   04/19/2024 01:39 PM 2.58 1.60 - 5.50 x10*3/uL Final     Comment:     Percent differential counts (%) should be interpreted in the context of the absolute cell counts (cells/uL).     Bilirubin, Total   Date/Time Value Ref Range Status   05/17/2024 02:05 PM 0.5 0.0 - 1.2 mg/dL Final   05/03/2024 02:46 PM 0.5 0.0 - 1.2 mg/dL Final   04/19/2024 01:39 PM 0.4 0.0 - 1.2 mg/dL Final     AST   Date/Time Value Ref Range Status   05/17/2024 02:05 PM 18 9 - 39 U/L Final   05/03/2024 02:46 PM 18 9 - 39 U/L Final   04/19/2024 01:39 PM 16 9 - 39 U/L Final     ALT   Date/Time Value Ref Range Status   05/17/2024 02:05 PM 17 10 - 52 U/L Final     Comment:     Patients treated with Sulfasalazine may generate falsely decreased results for ALT.   05/03/2024 02:46 PM 16 10 - 52 U/L Final     Comment:     Patients treated with Sulfasalazine may generate falsely decreased results for ALT.   04/19/2024 01:39 PM 14 10 - 52 U/L Final     Comment:     Patients treated with Sulfasalazine may generate falsely decreased results for ALT.     Creatinine   Date/Time Value Ref Range Status   05/17/2024 02:05 PM 0.81 0.50 - 1.30 mg/dL Final   05/03/2024 02:46 PM 0.76 0.50 - 1.30 mg/dL Final   04/19/2024 01:39 PM 0.73 0.50 - 1.30 mg/dL Final     Urea Nitrogen   Date/Time Value Ref Range Status   05/17/2024 02:05 PM 13 6 - 23 mg/dL Final   05/03/2024 02:46 PM 16 6 - 23 mg/dL Final   04/19/2024 01:39 PM 14 6 - 23 mg/dL Final     Albumin   Date/Time Value Ref Range Status   05/17/2024 02:05 PM 3.5 3.4 - 5.0 g/dL Final   05/03/2024 02:46 PM 3.5 3.4 - 5.0 g/dL Final   04/19/2024 01:39 PM 3.5 3.4 - 5.0 g/dL Final     No results found for: \"\"  Carcinoembryonic AG   Date/Time Value Ref Range Status   05/17/2024 02:05 PM 7.1 ug/L Final   05/03/2024 02:46 PM 6.6 ug/L Final   04/23/2024 08:19 AM 10.0 ug/L Final     === 05/16/24 ===    CT CHEST ABDOMEN PELVIS W IV CONTRAST    - Impression -  CHEST:  1.  Stable pulmonary " nodules and right lower paratracheal lymph node  as described.  2. Moderate coronary artery calcifications.    ABDOMEN-PELVIS:  1.  Stable appearance of circumferential masslike wall thickening of  the superior rectum.  2. Stable appearance of numerous hypoattenuating lesions throughout  the liver.  3. Stable appearance of right inguinal hernia containing multiple  loops of bowel without CT evidence of obstruction.      MACRO:  None    Signed by: Rich Medina 5/18/2024 5:12 PM  Dictation workstation:   [id number]    Assessment/Plan     Assessment:    74 y.o. man with metastatic rectal cancer with liver and lung lesions diagnosed originally in 6/2023. Developed progressive disease in 12/2023.  He started FOLFIRI + Bevacizumab after that.      Plan:  Stage IV rectal Ca: s/p 9 cycles of FOLFIRI / Yohana   - He is not interested in pursuing more treatment but is interested in his status of disease so he has agreed to get labs and scans in the coming month to determine the likely course of his disease.  - RTC ~9/10/24 with CT c/a/p and labs    H/O ITP -     - s/p splencectomy   -   plt cutoff is 75,000 for chemotherapy   -receiving romiplostim every other week during chemo     - PLTs have been stable on this      Bobby Pickard MD  [facility]  Office: [phone]  Fax: [phone]

## 2024-08-13 NOTE — PROGRESS NOTES
Patient here today for FUV. Patient states that he has been following his disease and that the end result is death and he has come to terms with it. Patient denies feeling depressed but states he does not like doing his normal activities. He stated he feels general malaise all the time. He stated that he went through a lot of chemo and the side effects were getting intolerable. He did not want to be exhausted all the time. He stated that it was not worth it to feel that way. He denies any pain, n/v/d. Occasional constipation that he takes prune juice for.

## 2024-09-06 ENCOUNTER — APPOINTMENT (OUTPATIENT)
Dept: HEMATOLOGY/ONCOLOGY | Facility: CLINIC | Age: 74
End: 2024-09-06
Payer: MEDICARE

## 2024-09-06 ENCOUNTER — INFUSION (OUTPATIENT)
Dept: HEMATOLOGY/ONCOLOGY | Facility: CLINIC | Age: 74
End: 2024-09-06
Payer: MEDICARE

## 2024-09-06 ENCOUNTER — HOSPITAL ENCOUNTER (OUTPATIENT)
Dept: RADIOLOGY | Facility: CLINIC | Age: 74
Discharge: HOME | End: 2024-09-06
Payer: MEDICARE

## 2024-09-06 DIAGNOSIS — C20 MALIGNANT NEOPLASM OF RECTUM (MULTI): ICD-10-CM

## 2024-09-06 DIAGNOSIS — D69.3 IDIOPATHIC THROMBOCYTOPENIC PURPURA (ITP) (MULTI): ICD-10-CM

## 2024-09-06 LAB
ALBUMIN SERPL BCP-MCNC: 3.1 G/DL (ref 3.4–5)
ALP SERPL-CCNC: 170 U/L (ref 33–136)
ALT SERPL W P-5'-P-CCNC: 18 U/L (ref 10–52)
ANION GAP SERPL CALC-SCNC: 14 MMOL/L (ref 10–20)
AST SERPL W P-5'-P-CCNC: 20 U/L (ref 9–39)
BASOPHILS # BLD AUTO: 0.06 X10*3/UL (ref 0–0.1)
BASOPHILS NFR BLD AUTO: 0.4 %
BILIRUB SERPL-MCNC: 0.5 MG/DL (ref 0–1.2)
BUN SERPL-MCNC: 11 MG/DL (ref 6–23)
CALCIUM SERPL-MCNC: 9.2 MG/DL (ref 8.6–10.3)
CEA SERPL-MCNC: 26.4 UG/L
CHLORIDE SERPL-SCNC: 99 MMOL/L (ref 98–107)
CO2 SERPL-SCNC: 25 MMOL/L (ref 21–32)
CREAT SERPL-MCNC: 0.69 MG/DL (ref 0.5–1.3)
EGFRCR SERPLBLD CKD-EPI 2021: >90 ML/MIN/1.73M*2
EOSINOPHIL # BLD AUTO: 0.06 X10*3/UL (ref 0–0.4)
EOSINOPHIL NFR BLD AUTO: 0.4 %
ERYTHROCYTE [DISTWIDTH] IN BLOOD BY AUTOMATED COUNT: 15.1 % (ref 11.5–14.5)
GLUCOSE SERPL-MCNC: 124 MG/DL (ref 74–99)
HCT VFR BLD AUTO: 27.9 % (ref 41–52)
HGB BLD-MCNC: 8.9 G/DL (ref 13.5–17.5)
IMM GRANULOCYTES # BLD AUTO: 0.07 X10*3/UL (ref 0–0.5)
IMM GRANULOCYTES NFR BLD AUTO: 0.4 % (ref 0–0.9)
LYMPHOCYTES # BLD AUTO: 2.05 X10*3/UL (ref 0.8–3)
LYMPHOCYTES NFR BLD AUTO: 12 %
MCH RBC QN AUTO: 25.3 PG (ref 26–34)
MCHC RBC AUTO-ENTMCNC: 31.9 G/DL (ref 32–36)
MCV RBC AUTO: 79 FL (ref 80–100)
MONOCYTES # BLD AUTO: 1.57 X10*3/UL (ref 0.05–0.8)
MONOCYTES NFR BLD AUTO: 9.2 %
NEUTROPHILS # BLD AUTO: 13.33 X10*3/UL (ref 1.6–5.5)
NEUTROPHILS NFR BLD AUTO: 77.6 %
NRBC BLD-RTO: 0 /100 WBCS (ref 0–0)
PLATELET # BLD AUTO: 307 X10*3/UL (ref 150–450)
POTASSIUM SERPL-SCNC: 3.9 MMOL/L (ref 3.5–5.3)
PROT SERPL-MCNC: 7.2 G/DL (ref 6.4–8.2)
RBC # BLD AUTO: 3.52 X10*6/UL (ref 4.5–5.9)
SODIUM SERPL-SCNC: 134 MMOL/L (ref 136–145)
WBC # BLD AUTO: 17.1 X10*3/UL (ref 4.4–11.3)

## 2024-09-06 PROCEDURE — 2550000001 HC RX 255 CONTRASTS: Performed by: INTERNAL MEDICINE

## 2024-09-06 PROCEDURE — 85025 COMPLETE CBC W/AUTO DIFF WBC: CPT | Performed by: INTERNAL MEDICINE

## 2024-09-06 PROCEDURE — 74177 CT ABD & PELVIS W/CONTRAST: CPT

## 2024-09-06 PROCEDURE — 82378 CARCINOEMBRYONIC ANTIGEN: CPT | Performed by: INTERNAL MEDICINE

## 2024-09-06 PROCEDURE — 2500000004 HC RX 250 GENERAL PHARMACY W/ HCPCS (ALT 636 FOR OP/ED): Performed by: INTERNAL MEDICINE

## 2024-09-06 PROCEDURE — 84075 ASSAY ALKALINE PHOSPHATASE: CPT | Performed by: INTERNAL MEDICINE

## 2024-09-06 RX ORDER — HEPARIN 100 UNIT/ML
500 SYRINGE INTRAVENOUS AS NEEDED
Status: DISCONTINUED | OUTPATIENT
Start: 2024-09-06 | End: 2024-09-06 | Stop reason: HOSPADM

## 2024-09-06 RX ORDER — HEPARIN SODIUM,PORCINE/PF 10 UNIT/ML
50 SYRINGE (ML) INTRAVENOUS AS NEEDED
OUTPATIENT
Start: 2024-09-06

## 2024-09-06 RX ORDER — HEPARIN SODIUM,PORCINE/PF 10 UNIT/ML
50 SYRINGE (ML) INTRAVENOUS AS NEEDED
Status: DISCONTINUED | OUTPATIENT
Start: 2024-09-06 | End: 2024-09-06 | Stop reason: HOSPADM

## 2024-09-06 RX ORDER — HEPARIN 100 UNIT/ML
500 SYRINGE INTRAVENOUS AS NEEDED
OUTPATIENT
Start: 2024-09-06

## 2024-09-10 ENCOUNTER — OFFICE VISIT (OUTPATIENT)
Dept: HEMATOLOGY/ONCOLOGY | Facility: CLINIC | Age: 74
End: 2024-09-10
Payer: MEDICARE

## 2024-09-10 VITALS
DIASTOLIC BLOOD PRESSURE: 78 MMHG | RESPIRATION RATE: 18 BRPM | TEMPERATURE: 98.6 F | WEIGHT: 185.41 LBS | OXYGEN SATURATION: 94 % | BODY MASS INDEX: 24.46 KG/M2 | SYSTOLIC BLOOD PRESSURE: 128 MMHG | HEART RATE: 90 BPM

## 2024-09-10 DIAGNOSIS — C20 MALIGNANT NEOPLASM OF RECTUM (MULTI): ICD-10-CM

## 2024-09-10 PROCEDURE — 1126F AMNT PAIN NOTED NONE PRSNT: CPT | Performed by: INTERNAL MEDICINE

## 2024-09-10 PROCEDURE — 1159F MED LIST DOCD IN RCRD: CPT | Performed by: INTERNAL MEDICINE

## 2024-09-10 PROCEDURE — 99214 OFFICE O/P EST MOD 30 MIN: CPT | Performed by: INTERNAL MEDICINE

## 2024-09-10 PROCEDURE — 3078F DIAST BP <80 MM HG: CPT | Performed by: INTERNAL MEDICINE

## 2024-09-10 PROCEDURE — 3074F SYST BP LT 130 MM HG: CPT | Performed by: INTERNAL MEDICINE

## 2024-09-10 ASSESSMENT — PAIN SCALES - GENERAL: PAINLEVEL: 0-NO PAIN

## 2024-09-10 ASSESSMENT — ENCOUNTER SYMPTOMS
DEPRESSION: 0
OCCASIONAL FEELINGS OF UNSTEADINESS: 0
LOSS OF SENSATION IN FEET: 0

## 2024-09-10 NOTE — PROGRESS NOTES
Patient ID: Bobby Holland is a 74 y.o. male from [place], [place].     Referring Physician: Bobby Pickard MD  [address] [place] [address]    Primary Care Provider: Bobby Pickard MD    Cancer History:   Treatment Synopsis:    DIAGNOSIS  Rectal Cancer 5/2023     STAGING  [id number]     CURRENT SITES OF DISEASE  Rectum, liver and lung     MOLECULAR GENOMICS    Test Name: ID Watchdog xT (XT.V4)  Laboratory Name: [facility]  Specimen Source: Colon, rectosigmoid  Collection Date: 15-May-2023  Cancer Type: Invasive adenocarcinoma  Report Id: [id number]    Molecular Findings:  * Gene: KRAS, Variant: Missense variant (exon 2) - GOF, Potentially Actionable, p.G12V (Allele Frequency - 16.6%)  * Gene: APC, Variant: Frameshift - LOF, Biologically Relevant, p.[id number] (Allele Frequency - 26.6%)  * Gene: APC, Variant: Stop gain - LOF, Biologically Relevant, p.R213*, Nonsense (Allele Frequency - 20.2%)  * Gene: FBXW7, Variant: Missense variant - LOF, Biologically Relevant, p.R505C (Allele Frequency - 23.3%)  * Gene: CCND1, Variant: Amplification, Copy number gain, Biologically Relevant  * Gene: FGF3, Variant: Amplification, Copy number gain, Biologically Relevant  * Gene: FGF4, Variant: Amplification, Copy number gain, Biologically Relevant  * Biomarker: Microsatellite Instability, Status: stable  * Biomarker: Tumor Mutation Lawrence (5.8 Muts/Mb, Percentile: 63.0)  * Disease associated genes/variants with no reportable findings:     * BRAF    * NRAS  * 7 variants of unknown significance    ** End of copied information **     SERUM TUMOR MARKER  CEA 4.4 - June 2023     CURRENT THERAPY  Dec 2023 progression- changed to FOLFIRI      PRIOR THERAPY  July 2023 - Dec 2023 FOLFOX      DEVICES  mediport     CURRENT PROBLEM LIST   ITP  - spleenectomy 2012   Graves ds  Metastatic colorectal cancer        Treatment History:    74 y.o.  male presenting to the emergency room with hematochezia in early April hemoglobin low taken off  Eliquis.  Bleeding initially resolved.  Anoscopy in May  2023 showed a large circumferential nonobstructing mass 7 cm in size at 9 cm from the anal verge.  Biopsy showed moderately differentiated adenocarcinoma.  MMR was proficient.  CEA was 4.7.  Imaging with Dr. Mccrary which showed lung and liver metastases.   PET scan confirmed FDG avidity of these lesions.    Patient decided to stop chemo around 6/2024.  He asked for scans to see the progression of disease so he can plan for the last weeks and months of life.  He has significant progression in abdomen and pelvis.  He wishes to pursue comfort measures only.  I have discussed hospice and referred him.         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  Social History:  Bobby reports that he has never smoked. He has never been exposed to tobacco smoke. He has never used smokeless tobacco. He reports current alcohol use of about 2.0 standard drinks of alcohol per week. He reports that he does not use drugs.  Family History:  No family history on file.  Family Oncology History:  Cancer-related family history is not on file.        SUBJECTIVE:    History of Present Illness:  Bobby Holland is a 74 y.o. male who was referred by Bobby Pickard MD  and presents with Follow-up (Fuv )    S/p 9 cycles FOLFIRI + bevacizumab -last dosed in 6/2024.     Pt lost to follow up due to pt not wanting to get treated anymore.  He is accepting of death but wanted to focus on some projects with his [employer] group.     Other history as above.  Interested in hospice only.      Overall feels ok, but having some pain when stands for over an hour.    - denies any cough, sob, cp   Appetite is low.  Using marijuana to help.   No fevers or chills.   No diarrhea   Energy level is pretty good   Denies pain   No other concerns        OBJECTIVE:    VS / Pain:  /78   Pulse 90   Temp 37 °C (98.6 °F)   Resp 18   Wt 84.1 kg (185 lb 6.5 oz)   SpO2 94%   BMI 24.46 kg/m²   BSA: 2.08 meters squared   Pain Scale: 0    Daily Weight  09/10/24 : 84.1 kg (185 lb 6.5 oz)  08/13/24 : 83.8 kg (184 lb 11.9 oz)  08/07/24 : 85.4 kg (188 lb 4 oz)  05/23/24 : 89.1 kg (196 lb 6.9 oz)  05/21/24 : 89.8 kg (197 lb 13.8 oz)  05/21/24 : 91 kg (200 lb 9.9 oz)  05/17/24 : 89.9 kg (198 lb 3.1 oz)      Physical Exam  Constitutional:       Appearance: He is normal weight.   HENT:      Nose: Nose normal.      Mouth/Throat:      Mouth: Mucous membranes are moist.   Eyes:      Pupils: Pupils are equal, round, and reactive to light.   Cardiovascular:      Rate and Rhythm: Normal rate.      Pulses: Normal pulses.   Abdominal:      General: Abdomen is flat.   Musculoskeletal:         General: Normal range of motion.   Skin:     General: Skin is warm.   Neurological:      General: No focal deficit present.      Mental Status: He is alert.   Psychiatric:         Mood and Affect: Mood normal.         Judgment: Judgment normal.     Performance Status:   ECOG 1    === 09/06/24 ===    CT CHEST ABDOMEN PELVIS W IV CONTRAST    - Impression -  Colorectal cancer, restaging scan. When compared with May 2024 study  there is significant interval worsening of overall tumor burden as  evidence by increase in size of the lung and hepatic metastasis. In  addition primary mass has also increased with now involvement of the  posterior bladder wall and invasion into the vagina. New linear  tracks of gas are seen just adjacent to the primary mass suggesting  of internal fistulous communication versus contained perforation. No  deep pelvic free fluid or abscess noted on current study however if  in the near future patient experiences new deep pelvic pain, fever,  increasing white count repeat CT imaging study is strongly  recommended to rule out the perforation of the primary tumor  with  abscess formation.    MACRO:  None    Signed by: Rg Levy 9/6/2024 12:57 PM  Dictation workstation:   [id number]      Diagnostic Results     WBC   Date/Time Value Ref Range Status   09/06/2024 07:52 AM 17.1 (H) 4.4 - 11.3 x10*3/uL Final   05/17/2024 02:05 PM 7.2 4.4 - 11.3 x10*3/uL Final   05/03/2024 02:46 PM 6.6 4.4 - 11.3 x10*3/uL Final     Hemoglobin   Date Value Ref Range Status   09/06/2024 8.9 (L) 13.5 - 17.5 g/dL Final   05/17/2024 11.6 (L) 13.5 - 17.5 g/dL Final   05/03/2024 11.2 (L) 13.5 - 17.5 g/dL Final     MCV   Date/Time Value Ref Range Status   09/06/2024 07:52 AM 79 (L) 80 - 100 fL Final   05/17/2024 02:05 PM 83 80 - 100 fL Final   05/03/2024 02:46 PM 84 80 - 100 fL Final     Platelets   Date/Time Value Ref Range Status   09/06/2024 07:52  150 - 450 x10*3/uL Final   05/17/2024 02:05  150 - 450 x10*3/uL Final   05/03/2024 02:46  150 - 450 x10*3/uL Final     Neutrophils Absolute   Date/Time Value Ref Range Status   09/06/2024 07:52 AM 13.33 (H) 1.60 - 5.50 x10*3/uL Final     Comment:     Percent differential counts (%) should be interpreted in the context of the absolute cell counts (cells/uL).   05/17/2024 02:05 PM 3.27 1.60 - 5.50 x10*3/uL Final     Comment:     Percent differential counts (%) should be interpreted in the context of the absolute cell counts (cells/uL).   05/03/2024 02:46 PM 3.00 1.60 - 5.50 x10*3/uL Final     Comment:     Percent differential counts (%) should be interpreted in the context of the absolute cell counts (cells/uL).     Bilirubin, Total   Date/Time Value Ref Range Status   09/06/2024 07:52 AM 0.5 0.0 - 1.2 mg/dL Final   05/17/2024 02:05 PM 0.5 0.0 - 1.2 mg/dL Final   05/03/2024 02:46 PM 0.5 0.0 - 1.2 mg/dL Final     AST   Date/Time Value Ref Range Status   09/06/2024 07:52 AM 20 9 - 39 U/L Final   05/17/2024 02:05 PM 18 9 - 39 U/L Final   05/03/2024 02:46 PM 18 9 - 39 U/L Final     ALT   Date/Time Value Ref Range Status   09/06/2024 07:52  "AM 18 10 - 52 U/L Final     Comment:     Patients treated with Sulfasalazine may generate falsely decreased results for ALT.   05/17/2024 02:05 PM 17 10 - 52 U/L Final     Comment:     Patients treated with Sulfasalazine may generate falsely decreased results for ALT.   05/03/2024 02:46 PM 16 10 - 52 U/L Final     Comment:     Patients treated with Sulfasalazine may generate falsely decreased results for ALT.     Creatinine   Date/Time Value Ref Range Status   09/06/2024 07:52 AM 0.69 0.50 - 1.30 mg/dL Final   05/17/2024 02:05 PM 0.81 0.50 - 1.30 mg/dL Final   05/03/2024 02:46 PM 0.76 0.50 - 1.30 mg/dL Final     Urea Nitrogen   Date/Time Value Ref Range Status   09/06/2024 07:52 AM 11 6 - 23 mg/dL Final   05/17/2024 02:05 PM 13 6 - 23 mg/dL Final   05/03/2024 02:46 PM 16 6 - 23 mg/dL Final     Albumin   Date/Time Value Ref Range Status   09/06/2024 07:52 AM 3.1 (L) 3.4 - 5.0 g/dL Final   05/17/2024 02:05 PM 3.5 3.4 - 5.0 g/dL Final   05/03/2024 02:46 PM 3.5 3.4 - 5.0 g/dL Final     No results found for: \"\"  Carcinoembryonic AG   Date/Time Value Ref Range Status   09/06/2024 07:52 AM 26.4 ug/L Final   05/17/2024 02:05 PM 7.1 ug/L Final   05/03/2024 02:46 PM 6.6 ug/L Final     === 05/16/24 ===    CT CHEST ABDOMEN PELVIS W IV CONTRAST    - Impression -  CHEST:  1.  Stable pulmonary nodules and right lower paratracheal lymph node  as described.  2. Moderate coronary artery calcifications.    ABDOMEN-PELVIS:  1.  Stable appearance of circumferential masslike wall thickening of  the superior rectum.  2. Stable appearance of numerous hypoattenuating lesions throughout  the liver.  3. Stable appearance of right inguinal hernia containing multiple  loops of bowel without CT evidence of obstruction.      MACRO:  None    Signed by: Rich Medina 5/18/2024 5:12 PM  Dictation workstation:   [id number]    Assessment/Plan     Assessment:    74 y.o. man with metastatic rectal cancer with liver and lung lesions diagnosed " originally in 6/2023. Developed progressive disease in 12/2023.  He started FOLFIRI + Bevacizumab after that.      Plan:  Stage IV rectal Ca: s/p 9 cycles of FOLFIRI / Yohana   - He is not interested in pursuing more treatment but is interested in his status of disease so he has agreed to pursue hospice only for now.  I have spoken to him at length about what to expect in the coming weeks.  I am available to assist with any symptoms that arise in the future.        H/O ITP -     - s/p splencectomy   -   plt cutoff is 75,000 for chemotherapy   -receiving romiplostim every other week during chemo     - PLTs have been stable on this      Bobby Pickard MD  [facility]  Office: [phone]  Fax: [phone]

## 2024-09-11 ENCOUNTER — TELEPHONE (OUTPATIENT)
Dept: ADMISSION | Facility: HOSPITAL | Age: 74
End: 2024-09-11
Payer: MEDICARE

## 2024-09-11 NOTE — TELEPHONE ENCOUNTER
Anaid from [facility] calling, received Referral needs to speak to Dr. Pickard's office about more information needed. Also wanted to notify that have not been able to make contact with patient yet.  Please call at [phone] ask for Anaid

## 2024-09-11 NOTE — PROGRESS NOTES
Patient in clinic today for follow up visit. He reports a good appetite, following personal choice to discontinue Marijuana use for appetite stimulation. He denies any specific concerns today, just to review scan with provider today. Medications, allergies, Falls, Skin risk, nutrition assessment and treatment plan reviewed with patient.

## 2024-10-07 ENCOUNTER — APPOINTMENT (OUTPATIENT)
Dept: ENDOCRINOLOGY | Facility: CLINIC | Age: 74
End: 2024-10-07
Payer: MEDICARE

## 2024-11-15 ENCOUNTER — HOSPITAL ENCOUNTER (EMERGENCY)
Facility: HOSPITAL | Age: 74
Discharge: HOME | End: 2024-11-15
Attending: EMERGENCY MEDICINE
Payer: MEDICARE

## 2024-11-15 VITALS
RESPIRATION RATE: 18 BRPM | OXYGEN SATURATION: 98 % | TEMPERATURE: 98.4 F | BODY MASS INDEX: 26.48 KG/M2 | SYSTOLIC BLOOD PRESSURE: 115 MMHG | HEIGHT: 70 IN | DIASTOLIC BLOOD PRESSURE: 81 MMHG | WEIGHT: 185 LBS | HEART RATE: 88 BPM

## 2024-11-15 DIAGNOSIS — D64.9 ANEMIA, UNSPECIFIED TYPE: Primary | ICD-10-CM

## 2024-11-15 DIAGNOSIS — R19.7 DIARRHEA, UNSPECIFIED TYPE: ICD-10-CM

## 2024-11-15 LAB
ABO GROUP (TYPE) IN BLOOD: NORMAL
ABO GROUP (TYPE) IN BLOOD: NORMAL
ACANTHOCYTES BLD QL SMEAR: NORMAL
ALBUMIN SERPL BCP-MCNC: 2.4 G/DL (ref 3.4–5)
ALP SERPL-CCNC: 450 U/L (ref 33–136)
ALT SERPL W P-5'-P-CCNC: 35 U/L (ref 10–52)
ANION GAP SERPL CALCULATED.3IONS-SCNC: 14 MMOL/L (ref 10–20)
AST SERPL W P-5'-P-CCNC: 33 U/L (ref 9–39)
BASOPHILS # BLD AUTO: 0.02 X10*3/UL (ref 0–0.1)
BASOPHILS NFR BLD AUTO: 0.1 %
BILIRUB SERPL-MCNC: 0.9 MG/DL (ref 0–1.2)
BLOOD EXPIRATION DATE: NORMAL
BUN SERPL-MCNC: 26 MG/DL (ref 6–23)
BURR CELLS BLD QL SMEAR: NORMAL
CALCIUM SERPL-MCNC: 8.6 MG/DL (ref 8.6–10.3)
CHLORIDE SERPL-SCNC: 104 MMOL/L (ref 98–107)
CO2 SERPL-SCNC: 24 MMOL/L (ref 21–32)
CREAT SERPL-MCNC: 0.83 MG/DL (ref 0.5–1.3)
DISPENSE STATUS: NORMAL
EGFRCR SERPLBLD CKD-EPI 2021: >90 ML/MIN/1.73M*2
EOSINOPHIL # BLD AUTO: 0 X10*3/UL (ref 0–0.4)
EOSINOPHIL NFR BLD AUTO: 0 %
ERYTHROCYTE [DISTWIDTH] IN BLOOD BY AUTOMATED COUNT: 21.3 % (ref 11.5–14.5)
GLUCOSE SERPL-MCNC: 112 MG/DL (ref 74–99)
HCT VFR BLD AUTO: 19.3 % (ref 41–52)
HGB BLD-MCNC: 5.9 G/DL (ref 13.5–17.5)
IMM GRANULOCYTES # BLD AUTO: 0.13 X10*3/UL (ref 0–0.5)
IMM GRANULOCYTES NFR BLD AUTO: 0.6 % (ref 0–0.9)
LYMPHOCYTES # BLD AUTO: 1.42 X10*3/UL (ref 0.8–3)
LYMPHOCYTES NFR BLD AUTO: 6.5 %
MCH RBC QN AUTO: 20.6 PG (ref 26–34)
MCHC RBC AUTO-ENTMCNC: 30.6 G/DL (ref 32–36)
MCV RBC AUTO: 67 FL (ref 80–100)
MONOCYTES # BLD AUTO: 0.84 X10*3/UL (ref 0.05–0.8)
MONOCYTES NFR BLD AUTO: 3.8 %
NEUTROPHILS # BLD AUTO: 19.49 X10*3/UL (ref 1.6–5.5)
NEUTROPHILS NFR BLD AUTO: 89 %
NRBC BLD-RTO: 0.6 /100 WBCS (ref 0–0)
PLATELET # BLD AUTO: 248 X10*3/UL (ref 150–450)
POLYCHROMASIA BLD QL SMEAR: NORMAL
POTASSIUM SERPL-SCNC: 3.9 MMOL/L (ref 3.5–5.3)
PRODUCT BLOOD TYPE: 1700
PRODUCT CODE: NORMAL
PROT SERPL-MCNC: 6.6 G/DL (ref 6.4–8.2)
RBC # BLD AUTO: 2.87 X10*6/UL (ref 4.5–5.9)
RBC MORPH BLD: NORMAL
RH FACTOR (ANTIGEN D): NORMAL
RH FACTOR (ANTIGEN D): NORMAL
SODIUM SERPL-SCNC: 138 MMOL/L (ref 136–145)
TARGETS BLD QL SMEAR: NORMAL
UNIT ABO: NORMAL
UNIT NUMBER: NORMAL
UNIT RH: NORMAL
UNIT VOLUME: 350
WBC # BLD AUTO: 21.9 X10*3/UL (ref 4.4–11.3)
XM INTEP: NORMAL

## 2024-11-15 PROCEDURE — 2500000004 HC RX 250 GENERAL PHARMACY W/ HCPCS (ALT 636 FOR OP/ED): Performed by: EMERGENCY MEDICINE

## 2024-11-15 PROCEDURE — 85025 COMPLETE CBC W/AUTO DIFF WBC: CPT

## 2024-11-15 PROCEDURE — 96374 THER/PROPH/DIAG INJ IV PUSH: CPT

## 2024-11-15 PROCEDURE — 86901 BLOOD TYPING SEROLOGIC RH(D): CPT

## 2024-11-15 PROCEDURE — 86920 COMPATIBILITY TEST SPIN: CPT

## 2024-11-15 PROCEDURE — P9016 RBC LEUKOCYTES REDUCED: HCPCS

## 2024-11-15 PROCEDURE — 36430 TRANSFUSION BLD/BLD COMPNT: CPT

## 2024-11-15 PROCEDURE — 99284 EMERGENCY DEPT VISIT MOD MDM: CPT | Mod: 25

## 2024-11-15 PROCEDURE — 80053 COMPREHEN METABOLIC PANEL: CPT

## 2024-11-15 RX ORDER — MORPHINE SULFATE 4 MG/ML
4 INJECTION, SOLUTION INTRAMUSCULAR; INTRAVENOUS ONCE
Status: COMPLETED | OUTPATIENT
Start: 2024-11-15 | End: 2024-11-15

## 2024-11-15 SDOH — SOCIAL STABILITY: SOCIAL NETWORK: HOW OFTEN DO YOU ATTEND MEETINGS OF THE CLUBS OR ORGANIZATIONS YOU BELONG TO?: NEVER

## 2024-11-15 SDOH — HEALTH STABILITY: MENTAL HEALTH: HOW MANY DRINKS CONTAINING ALCOHOL DO YOU HAVE ON A TYPICAL DAY WHEN YOU ARE DRINKING?: PATIENT DOES NOT DRINK

## 2024-11-15 SDOH — ECONOMIC STABILITY: FOOD INSECURITY: HOW HARD IS IT FOR YOU TO PAY FOR THE VERY BASICS LIKE FOOD, HOUSING, MEDICAL CARE, AND HEATING?: SOMEWHAT HARD

## 2024-11-15 SDOH — ECONOMIC STABILITY: TRANSPORTATION INSECURITY: IN THE PAST 12 MONTHS, HAS LACK OF TRANSPORTATION KEPT YOU FROM MEDICAL APPOINTMENTS OR FROM GETTING MEDICATIONS?: NO

## 2024-11-15 SDOH — ECONOMIC STABILITY: FOOD INSECURITY: WITHIN THE PAST 12 MONTHS, YOU WORRIED THAT YOUR FOOD WOULD RUN OUT BEFORE YOU GOT THE MONEY TO BUY MORE.: NEVER TRUE

## 2024-11-15 SDOH — HEALTH STABILITY: PHYSICAL HEALTH: ON AVERAGE, HOW MANY DAYS PER WEEK DO YOU ENGAGE IN MODERATE TO STRENUOUS EXERCISE (LIKE A BRISK WALK)?: 0 DAYS

## 2024-11-15 SDOH — SOCIAL STABILITY: SOCIAL INSECURITY: WITHIN THE LAST YEAR, HAVE YOU BEEN AFRAID OF YOUR PARTNER OR EX-PARTNER?: NO

## 2024-11-15 SDOH — HEALTH STABILITY: MENTAL HEALTH
DO YOU FEEL STRESS - TENSE, RESTLESS, NERVOUS, OR ANXIOUS, OR UNABLE TO SLEEP AT NIGHT BECAUSE YOUR MIND IS TROUBLED ALL THE TIME - THESE DAYS?: VERY MUCH

## 2024-11-15 SDOH — SOCIAL STABILITY: SOCIAL NETWORK: HOW OFTEN DO YOU ATTEND CHURCH OR RELIGIOUS SERVICES?: NEVER

## 2024-11-15 SDOH — ECONOMIC STABILITY: HOUSING INSECURITY: IN THE PAST 12 MONTHS, HOW MANY TIMES HAVE YOU MOVED WHERE YOU WERE LIVING?: 0

## 2024-11-15 SDOH — ECONOMIC STABILITY: HOUSING INSECURITY: AT ANY TIME IN THE PAST 12 MONTHS, WERE YOU HOMELESS OR LIVING IN A SHELTER (INCLUDING NOW)?: NO

## 2024-11-15 SDOH — HEALTH STABILITY: PHYSICAL HEALTH
HOW OFTEN DO YOU NEED TO HAVE SOMEONE HELP YOU WHEN YOU READ INSTRUCTIONS, PAMPHLETS, OR OTHER WRITTEN MATERIAL FROM YOUR DOCTOR OR PHARMACY?: NEVER

## 2024-11-15 SDOH — ECONOMIC STABILITY: INCOME INSECURITY: IN THE PAST 12 MONTHS HAS THE ELECTRIC, GAS, OIL, OR WATER COMPANY THREATENED TO SHUT OFF SERVICES IN YOUR HOME?: NO

## 2024-11-15 SDOH — HEALTH STABILITY: PHYSICAL HEALTH: ON AVERAGE, HOW MANY MINUTES DO YOU ENGAGE IN EXERCISE AT THIS LEVEL?: 0 MIN

## 2024-11-15 SDOH — HEALTH STABILITY: MENTAL HEALTH: HOW OFTEN DO YOU HAVE SIX OR MORE DRINKS ON ONE OCCASION?: NEVER

## 2024-11-15 SDOH — ECONOMIC STABILITY: FOOD INSECURITY: WITHIN THE PAST 12 MONTHS, THE FOOD YOU BOUGHT JUST DIDN'T LAST AND YOU DIDN'T HAVE MONEY TO GET MORE.: NEVER TRUE

## 2024-11-15 SDOH — SOCIAL STABILITY: SOCIAL INSECURITY: ARE YOU MARRIED, WIDOWED, DIVORCED, SEPARATED, NEVER MARRIED, OR LIVING WITH A PARTNER?: NEVER MARRIED

## 2024-11-15 SDOH — SOCIAL STABILITY: SOCIAL NETWORK
DO YOU BELONG TO ANY CLUBS OR ORGANIZATIONS SUCH AS CHURCH GROUPS, UNIONS, FRATERNAL OR ATHLETIC GROUPS, OR SCHOOL GROUPS?: NO

## 2024-11-15 SDOH — SOCIAL STABILITY: SOCIAL INSECURITY
WITHIN THE LAST YEAR, HAVE YOU BEEN KICKED, HIT, SLAPPED, OR OTHERWISE PHYSICALLY HURT BY YOUR PARTNER OR EX-PARTNER?: NO

## 2024-11-15 SDOH — SOCIAL STABILITY: SOCIAL INSECURITY
WITHIN THE LAST YEAR, HAVE YOU BEEN RAPED OR FORCED TO HAVE ANY KIND OF SEXUAL ACTIVITY BY YOUR PARTNER OR EX-PARTNER?: NO

## 2024-11-15 SDOH — ECONOMIC STABILITY: HOUSING INSECURITY: IN THE LAST 12 MONTHS, WAS THERE A TIME WHEN YOU WERE NOT ABLE TO PAY THE MORTGAGE OR RENT ON TIME?: NO

## 2024-11-15 SDOH — HEALTH STABILITY: MENTAL HEALTH: HOW OFTEN DO YOU HAVE A DRINK CONTAINING ALCOHOL?: NEVER

## 2024-11-15 SDOH — SOCIAL STABILITY: SOCIAL INSECURITY: WITHIN THE LAST YEAR, HAVE YOU BEEN HUMILIATED OR EMOTIONALLY ABUSED IN OTHER WAYS BY YOUR PARTNER OR EX-PARTNER?: NO

## 2024-11-15 SDOH — SOCIAL STABILITY: SOCIAL NETWORK
IN A TYPICAL WEEK, HOW MANY TIMES DO YOU TALK ON THE PHONE WITH FAMILY, FRIENDS, OR NEIGHBORS?: MORE THAN THREE TIMES A WEEK

## 2024-11-15 SDOH — SOCIAL STABILITY: SOCIAL NETWORK: HOW OFTEN DO YOU GET TOGETHER WITH FRIENDS OR RELATIVES?: TWICE A WEEK

## 2024-11-15 ASSESSMENT — LIFESTYLE VARIABLES
EVER FELT BAD OR GUILTY ABOUT YOUR DRINKING: NO
EVER HAD A DRINK FIRST THING IN THE MORNING TO STEADY YOUR NERVES TO GET RID OF A HANGOVER: NO
TOTAL SCORE: 0
SKIP TO QUESTIONS 9-10: 1
AUDIT-C TOTAL SCORE: 0
HAVE PEOPLE ANNOYED YOU BY CRITICIZING YOUR DRINKING: NO
HAVE YOU EVER FELT YOU SHOULD CUT DOWN ON YOUR DRINKING: NO

## 2024-11-15 ASSESSMENT — ACTIVITIES OF DAILY LIVING (ADL)
LACK_OF_TRANSPORTATION: NO
LACK_OF_TRANSPORTATION: NO

## 2024-11-15 ASSESSMENT — PAIN SCALES - GENERAL: PAINLEVEL_OUTOF10: 9

## 2024-11-15 ASSESSMENT — PAIN - FUNCTIONAL ASSESSMENT: PAIN_FUNCTIONAL_ASSESSMENT: 0-10

## 2024-11-15 NOTE — CARE PLAN
CONTINUED FROM PREVIOUS NOTE:    [facility] will be here between 12 noon and 12:30 today.    DISCHARGE PLAN: HOSPICE HOUSE OR GIP vs HOME WITH HOSPICE vs PRIVATE PAY AT A FACILITY WITH HOSPICE.    13:20: pt will leave for Hospice House at 7:30 PM today    DISCHARGE PLAN: HOSPICE HOUSE TODAY AT 7:30 PM

## 2024-11-15 NOTE — PROGRESS NOTES
11/15/24 0925   Discharge Planning   Living Arrangements Alone   Support Systems Spouse/significant other   Type of Residence Private residence   Do you have animals or pets at home? No   Who is requesting discharge planning? Provider   Home or Post Acute Services Other (Comment)  (Referral placed to [facility]--pt may go to [facility] if he qualifies)   Does the patient need discharge transport arranged? Yes   RoundTrip coordination needed? Yes   Has discharge transport been arranged? No   Financial Resource Strain   How hard is it for you to pay for the very basics like food, housing, medical care, and heating? Somewhat   Housing Stability   In the last 12 months, was there a time when you were not able to pay the mortgage or rent on time? N   In the past 12 months, how many times have you moved where you were living? 0   At any time in the past 12 months, were you homeless or living in a shelter (including now)? N   Transportation Needs   In the past 12 months, has lack of transportation kept you from medical appointments or from getting medications? no   In the past 12 months, has lack of transportation kept you from meetings, work, or from getting things needed for daily living? No   Patient Choice   Provider Choice list and CMS website (https://medicare.gov/care-compare#search) for post-acute Quality and Resource Measure Data were provided and reviewed with: Patient;Representative   Patient / Family choosing to utilize agency / facility established prior to hospitalization Yes   Stroke Family Assessment   Stroke Family Assessment Needed No

## 2024-11-15 NOTE — CARE PLAN
Pt does not have a POA or Living Will--discussed POA and Living Will with pt and his sig other Lakia Mckay C) [phone] (h) [phone]  Pt said that his sister Catherine Roe ([phone]) will make the decisions for him. As of this time the pts sister does not know that he has cancer and that he is at end of life. Encouraged pt to call his sister and make her aware especially if he wants her to make decisions for him    ADOD: today--sent referral to [facility]--requesting assessment and sign on today with Hospice and move to [facility]osp[facility] if pt qualifies    Pt lives at home alone, he has a sig other by the name of Lakia Mckay [phone]. Lakia does not live with him but she is in constant contact with him and he was staying at her home on the weekends.  At this time Bobby is too weak to stay at home and care for himself; he is having difficulty with ambulation. Per the physician report, EMS reports feces all over the house.  Pt will not be able to go back home with the house in that condition. Pts sig other Lakia is aware; spoke to Lakia about several discharge possibilities. #1) go to [facility]ospice [facility] asap--informed Lakia that Willam garcia would still need to be cleaned up, carpet cleaned ect in case he has to go home--[facility] and Hospice will not go into the home because it is considered a health hazard.- Lakia verbalized understanding  2) nursing home with hospice--pt would have to pay the daily cost of the bed and Hospice will be there for nursing care, aide, care, provide comfort meds ect.  3) home with hospice--hospice can provide all necessary equipment for the pt but he would need 24/7 care--Lakia is willing to have Bobby at her home. Lakia works so she would need to provide a caregiver for Bobby when he is home alone.   Pt said that he has about $17,000 in one account.   Pt does not write out checks, he buys money orders to pay his bills; he does not have a bank card. Unknown at this time how pt will be able  to retrieve the funds to pay for a nursing home if needed or C aide.   Lakia will apply for Medicaid for Bobby on the computer today- unknown at this time if pt will need to use Medicaid for nursing facility; but at least the process will be started and in motion  Referral placed to [facility]-waiting for response.  09:55 phoned [facility]-- they will pass the info along to the ; call the pts sig other Lakia to scheduled the time; informed Hospice that pt is in [facility] and that hoping for pt to qualify for [facility]ospice [facility].    DISCHARGE PLAN: TBD--do not discharge patient before speaking with care coordination; pt does not yet have a safe discharge plan in place.

## 2024-11-15 NOTE — PROGRESS NOTES
11/15/24 0927   [facility] Disability Status   Are you deaf or do you have serious difficulty hearing? N   Are you blind or do you have serious difficulty seeing, even when wearing glasses? N   Because of a physical, mental, or emotional condition, do you have serious difficulty concentrating, remembering, or making decisions? (5 years old or older) N   Do you have serious difficulty walking or climbing stairs? Y   Do you have serious difficulty dressing or bathing? Y   Because of a physical, mental, or emotional condition, do you have serious difficulty doing errands alone such as visiting the doctor? Y

## 2024-11-15 NOTE — ED PROVIDER NOTES
HPI   Chief Complaint   Patient presents with    Abdominal Pain    Diarrhea       Patient is a 74-year-old male presenting to the emergency department for ration of diarrhea.  Patient has a history of stage IV rectal cancer.  He was following up with oncology until September when he was referred to hospice.  He states at that time he was not ready to go on hospice as he was still ambulatory.  He states he is now interested in hospice resources.  He states he is still able to get around at home and make it to the bathroom however per EMS there were feces all over the house.  He denies chest pain, shortness of breath, fevers, chills, nausea, vomiting, abdominal pain.               Patient History   Past Medical History:   Diagnosis Date    Immune thrombocytopenic purpura (Multi) 03/11/2014    Immune thrombocytopenic purpura     Past Surgical History:   Procedure Laterality Date    SPLENECTOMY, TOTAL  08/20/2018    Splenectomy     No family history on file.  Social History     Tobacco Use    Smoking status: Never     Passive exposure: Never    Smokeless tobacco: Never   Vaping Use    Vaping status: Never Used   Substance Use Topics    Alcohol use: Yes     Alcohol/week: 2.0 standard drinks of alcohol     Types: 2 Cans of beer per week    Drug use: Never       Physical Exam   ED Triage Vitals [11/15/24 0814]   Temperature Heart Rate Respirations BP   37 °C (98.6 °F) 100 18 111/74      Pulse Ox Temp Source Heart Rate Source Patient Position   100 % Oral Monitor --      BP Location FiO2 (%)     Left arm --       Physical Exam  Vitals and nursing note reviewed.   Constitutional:       General: He is not in acute distress.     Appearance: He is not ill-appearing or toxic-appearing.      Comments: Disheveled appearing and foul-smelling   HENT:      Head: Normocephalic and atraumatic.   Cardiovascular:      Rate and Rhythm: Normal rate and regular rhythm.   Pulmonary:      Effort: Pulmonary effort is normal.      Breath  sounds: Normal breath sounds.   Abdominal:      Palpations: Abdomen is soft.      Tenderness: There is no abdominal tenderness. There is no guarding or rebound.   Skin:     General: Skin is warm and dry.   Neurological:      General: No focal deficit present.      Mental Status: He is alert and oriented to person, place, and time.   Psychiatric:         Mood and Affect: Mood normal.         Behavior: Behavior normal.           ED Course & MDM   Diagnoses as of 11/15/24 1515   Anemia, unspecified type   Diarrhea, unspecified type                 No data recorded     [place] Coma Scale Score: 15 (11/15/24 0816 : Ashlie Interiano, YANA)                           Medical Decision Making  **Disclaimer parts of this chart have been completed using voice recognition software. Please excuse any errors of transcription.     Patient seen in conjunction with attending physician .     HPI: Detailed above.    Exam: A medically appropriate exam performed, outlined above, given the known history and presentation.    History obtained from: Patient    Labs/Diagnostics:  Labs Reviewed   CBC WITH AUTO DIFFERENTIAL - Abnormal       Result Value    WBC 21.9 (*)     nRBC 0.6 (*)     RBC 2.87 (*)     Hemoglobin 5.9 (*)     Hematocrit 19.3 (*)     MCV 67 (*)     MCH 20.6 (*)     MCHC 30.6 (*)     RDW 21.3 (*)     Platelets 248      Neutrophils % 89.0      Immature Granulocytes %, Automated 0.6      Lymphocytes % 6.5      Monocytes % 3.8      Eosinophils % 0.0      Basophils % 0.1      Neutrophils Absolute 19.49 (*)     Immature Granulocytes Absolute, Automated 0.13      Lymphocytes Absolute 1.42      Monocytes Absolute 0.84 (*)     Eosinophils Absolute 0.00      Basophils Absolute 0.02     COMPREHENSIVE METABOLIC PANEL - Abnormal    Glucose 112 (*)     Sodium 138      Potassium 3.9      Chloride 104      Bicarbonate 24      Anion Gap 14      Urea Nitrogen 26 (*)     Creatinine 0.83      eGFR >90      Calcium 8.6      Albumin 2.4 (*)      Alkaline Phosphatase 450 (*)     Total Protein 6.6      AST 33      Bilirubin, Total 0.9      ALT 35     TYPE AND SCREEN    ABO TYPE B      Rh TYPE POS     VERAB/VERIFY ABORH    ABO TYPE B      Rh TYPE POS     URINALYSIS WITH REFLEX CULTURE AND MICROSCOPIC    Narrative:     The following orders were created for panel order Urinalysis with Reflex Culture and Microscopic.  Procedure                               Abnormality         Status                     ---------                               -----------         ------                     Urinalysis with Reflex C...[[id number]]                                                 Extra Urine Gray Tube[[id number]]                                                         Please view results for these tests on the individual orders.   URINALYSIS WITH REFLEX CULTURE AND MICROSCOPIC   EXTRA URINE GRAY TUBE   PREPARE RBC    PRODUCT CODE [id number]      Unit Number [id number]      Unit ABO B      Unit RH NEG      XM INTEP COMP      Dispense Status IS      Blood Expiration Date 11/16/2024 11:59:00 PM EST      PRODUCT BLOOD TYPE 1700      UNIT VOLUME 350     MORPHOLOGY    RBC Morphology See Below      Polychromasia Marked      Target Cells Many      [place] Cells Few      Acanthocytes Many       EMERGENCY DEPARTMENT COURSE and DIFFERENTIAL DIAGNOSIS/MDM:  Patient is a 74-year-old male presenting to the emergency department for evaluation of diarrhea.  On physical exam vital signs stable and patient is in no acute distress.  He is disheveled appearing and foul-smelling.  Abdomen nontender to palpation.  Diagnostic labs ordered and consult placed to case management.  CBC remarkable for hemoglobin of 5.9 and a leukocytosis.  Counseled on information about a blood transfusion and going on hospice.  Patient states he would like the blood transfusion and therefore signed consent for blood.  CMP showed no electrolyte abnormalities.  Patient states he is having some burning with  "urination and therefore urinalysis ordered.  Patient given 1 unit of blood.  Case management saw the patient and set up hospice for him.  Patient will go to hospice at 1930.  It has reached the end of my shift and patient still waiting on hospice.  Handoff therefore given to attending physician in the emergency department Dr. aBrbosa.     Patient had a high probability of life-threatening deterioration due to injuries or symptoms concerning for anemia requiring blood transfusion requiring my direct attention, intervention and management. I spent 35 minutes of critical care time with this patient involving bedside care, chart review, interpreting labs and diagnostics, and consultations.  Excluding separately billable procedures.    Vitals:    Vitals:    11/15/24 0814 11/15/24 1045 11/15/24 1439 11/15/24 1507   BP: 111/74 114/75  121/81   BP Location: Left arm Left arm     Patient Position:  Sitting     Pulse: 100 88  88   Resp: 18 18     Temp: 37 °C (98.6 °F) 36.9 °C (98.4 °F)     TempSrc: Oral Oral     SpO2: 100% 97% 97% 98%   Weight: 83.9 kg (185 lb)      Height: 1.778 m (5' 10\")        History Limited by:    None    Independent history obtained from:    Friend    External records reviewed:    Outpatient Note previous note from oncology determine patient's diagnosis and treatment plan    Diagnostics interpreted by me:    None    Discussions with other clinicians:    Case Management Lynn    Chronic conditions impacting care:    Cancer    Social determinants of health affecting care:    None    Diagnostic tests considered but not performed: None    ED Medications managed:    Medications   morphine injection 4 mg (4 mg intravenous Given 11/15/24 1505)       Prescription drugs considered:    None    Screenings:              Procedure  Procedures     Lu Schmitt PA-C  11/15/24 1515    "

## 2024-11-15 NOTE — PROGRESS NOTES
11/15/24 0922   Physical Activity   On average, how many days per week do you engage in moderate to strenuous exercise (like a brisk walk)? 0 days   On average, how many minutes do you engage in exercise at this level? 0 min   Financial Resource Strain   How hard is it for you to pay for the very basics like food, housing, medical care, and heating? Somewhat   Housing Stability   In the last 12 months, was there a time when you were not able to pay the mortgage or rent on time? N   In the past 12 months, how many times have you moved where you were living? 0   At any time in the past 12 months, were you homeless or living in a shelter (including now)? N   Transportation Needs   In the past 12 months, has lack of transportation kept you from medical appointments or from getting medications? no   In the past 12 months, has lack of transportation kept you from meetings, work, or from getting things needed for daily living? No   Food Insecurity   Within the past 12 months, you worried that your food would run out before you got the money to buy more. Never true   Within the past 12 months, the food you bought just didn't last and you didn't have money to get more. Never true   Stress   Do you feel stress - tense, restless, nervous, or anxious, or unable to sleep at night because your mind is troubled all the time - these days? Very much  (per his sig other Lakia Mckay, pt has been very anxious x 2 weeks.)   Social Connections   In a typical week, how many times do you talk on the phone with family, friends, or neighbors? More than 3   How often do you get together with friends or relatives? Twice  (weekends with sig other)   How often do you attend Sabianism or [employer]ish services? Never   Do you belong to any clubs or organizations such as Sabianism groups, unions, fraternal or athletic groups, or school groups? No   How often do you attend meetings of the clubs or organizations you belong to? Never    Are you , , , , never , or living with a partner? Never marrie   Intimate Partner Violence   Within the last year, have you been afraid of your partner or ex-partner? No   Within the last year, have you been humiliated or emotionally abused in other ways by your partner or ex-partner? No   Within the last year, have you been kicked, hit, slapped, or otherwise physically hurt by your partner or ex-partner? No   Within the last year, have you been raped or forced to have any kind of sexual activity by your partner or ex-partner? No   Alcohol Use   Q1: How often do you have a drink containing alcohol? Never   Q2: How many drinks containing alcohol do you have on a typical day when you are drinking? None   Q3: How often do you have six or more drinks on one occasion? Never   Utilities   In the past 12 months has the electric, gas, oil, or water company threatened to shut off services in your home? No   Health Literacy   How often do you need to have someone help you when you read instructions, pamphlets, or other written material from your doctor or pharmacy? Never

## 2024-11-15 NOTE — ED TRIAGE NOTES
Pt has hx of colon cancer that is spreading to lungs. Pt currently not getting any treatment for cancer. Pt states that he has been having diarrhea on and off for a few months, pt states that he is having abdominal pain. Pt alert and oriented x 4.